# Patient Record
Sex: MALE | Race: WHITE | NOT HISPANIC OR LATINO | Employment: FULL TIME | URBAN - METROPOLITAN AREA
[De-identification: names, ages, dates, MRNs, and addresses within clinical notes are randomized per-mention and may not be internally consistent; named-entity substitution may affect disease eponyms.]

---

## 2017-01-20 NOTE — TELEPHONE ENCOUNTER
Glipizide         Invokana    Called pharmacy, they had not received the 11/2/16 refill we sent.  I spoke with pharmacist to give verbal.    We can delete these requests.

## 2017-01-23 RX ORDER — GLIPIZIDE 10 MG/1
TABLET, FILM COATED, EXTENDED RELEASE ORAL
Qty: 180 TABLET | Refills: 0 | OUTPATIENT
Start: 2017-01-23

## 2017-01-23 RX ORDER — CANAGLIFLOZIN 300 MG/1
TABLET, FILM COATED ORAL
Qty: 90 TABLET | Refills: 0 | OUTPATIENT
Start: 2017-01-23

## 2017-03-21 ENCOUNTER — OFFICE VISIT (OUTPATIENT)
Dept: DERMATOLOGY | Facility: CLINIC | Age: 42
End: 2017-03-21
Payer: COMMERCIAL

## 2017-03-21 DIAGNOSIS — L21.9 DERMATITIS, SEBORRHEIC: Primary | ICD-10-CM

## 2017-03-21 DIAGNOSIS — Z86.018 HISTORY OF DYSPLASTIC NEVUS: ICD-10-CM

## 2017-03-21 DIAGNOSIS — D23.9 DERMATOFIBROMA: ICD-10-CM

## 2017-03-21 DIAGNOSIS — R21 RASH: ICD-10-CM

## 2017-03-21 PROCEDURE — 99213 OFFICE O/P EST LOW 20 MIN: CPT | Performed by: DERMATOLOGY

## 2017-03-21 RX ORDER — KETOCONAZOLE 20 MG/ML
SHAMPOO TOPICAL
Qty: 120 ML | Refills: 9 | Status: SHIPPED | OUTPATIENT
Start: 2017-03-21 | End: 2017-07-06

## 2017-03-21 RX ORDER — DESONIDE 0.5 MG/G
CREAM TOPICAL
Qty: 60 G | Refills: 0 | Status: SHIPPED | OUTPATIENT
Start: 2017-03-21 | End: 2018-07-10

## 2017-03-21 NOTE — NURSING NOTE
Dermatology Rooming Note    Jose Crews's goals for this visit include:   Chief Complaint   Patient presents with     RECHECK     1 year skin check - Hx of dysplastic nevus - spot of concern on lower left leg       Is a scribe okay for this visit: YES    Are records needed for this visit(If yes, obtain release of information): NO     Vitals: There were no vitals taken for this visit.    Referring Provider:  No referring provider defined for this encounter.    Sarah Paniagua, CMA

## 2017-03-21 NOTE — MR AVS SNAPSHOT
After Visit Summary   3/21/2017    Jose Crews    MRN: [id number]           Patient Information     Date Of Birth          1975        Visit Information        Provider Department      3/21/2017 8:45 AM Klarissa Cancino MD [facility]        Today's Diagnoses     Dermatitis, seborrheic    -  1    History of dysplastic nevus        Dermatofibroma        Rash          Care Instructions    Recommend avoiding deodorants with aluminum chlorohexahydrate (in all antiperspirants). Discussed that propylene glycol or fragrance may also cause dermatitis as an irritant or allergen. If dermatitis continues despite change in deodorant, recommend patient be seen for patch testing. Recommend trial on Almay fragrance free product.           Follow-ups after your visit        Your next 10 appointments already scheduled     Apr 10, 2017  8:15 AM CDT   Return Visit with Jeff Adkins MD, [facility] NURSE ONLY   [facility] ([facility])    [address] [place] [address] [phone]            Jul 06, 2017  8:45 AM CDT   Return Visit with Klarissa Cancino MD   [facility] ([facility])    [address] [place] [id number] [phone]              Who to contact     If you have questions or need follow up information about today's clinic visit or your schedule please contact [facility] directly at [phone].  Normal or non-critical lab and imaging results will be communicated to you by MyChart, letter or phone within 4 business days after the clinic has received the results. If you do not hear from us within 7 days, please contact the clinic through MyChart or phone. If you have a critical or abnormal lab result, we will notify you by phone as soon as possible.  Submit refill requests through [employer] or call your pharmacy and they will forward the refill request to us. Please  allow 3 business days for your refill to be completed.          Additional Information About Your Visit        [employer] Information     Story[employer] is an electronic gateway that provides easy, online access to your medical records. With Storyful, you can request a clinic appointment, read your test results, renew a prescription or communicate with your care team.     To sign up for Storyful visit the website at [email or web address]   You will be asked to enter the access code listed below, as well as some personal information. Please follow the directions to create your username and password.     Your access code is: [email or web address]  Expires: 2017  9:15 AM     Your access code will  in 90 days. If you need help or a new code, please contact your [facility] or call [phone] for assistance.        Care EveryWhere ID     This is your Care EveryWhere ID. This could be used by other organizations to access your [place] medical records  [id number]         Blood Pressure from Last 3 Encounters:   10/31/16 118/86   16 130/80   16 129/84    Weight from Last 3 Encounters:   10/31/16 219 lb (99.3 kg)   16 221 lb 12.8 oz (100.6 kg)   16 220 lb 4.8 oz (99.9 kg)              Today, you had the following     No orders found for display         Today's Medication Changes          These changes are accurate as of: 3/21/17  9:22 AM.  If you have any questions, ask your nurse or doctor.               Stop taking these medicines if you haven't already. Please contact your care team if you have questions.     fluocinolone 0.01 % solution   Commonly known as:  SYNALAR                Where to get your medicines      These medications were sent to [facility] - [address]  [address] [place] [address] [address]     Phone:  [phone]     desonide 0.05 % cream    ketoconazole 2 % shampoo                Primary Care Provider Office  Phone # Fax #    Steffanie Villa [phone]       [facility] [address]  [facility] [address]        Thank you!     Thank you for choosing [facility]  for your care. Our goal is always to provide you with excellent care. Hearing back from our patients is one way we can continue to improve our services. Please take a few minutes to complete the written survey that you may receive in the mail after your visit with us. Thank you!             Your Updated Medication List - Protect others around you: Learn how to safely use, store and throw away your medicines at www.disposemymeds.org.          This list is accurate as of: 3/21/17  9:22 AM.  Always use your most recent med list.                   Brand Name Dispense Instructions for use    * ACE/ARB NOT PRESCRIBED (INTENTIONAL)      by Other route continuous prn.       * ARB NOT PRESCRIBED (INTENTIONAL)      by Other route continuous prn.       aspirin 81 MG tablet      Take 1 tablet by mouth daily. *       blood glucose calibration NORMAL solution     2 Bottle    Use to calibrate blood glucose monitor as directed.       blood glucose monitoring lancets     100 Box    1 each daily       * blood glucose monitoring test strip    no brand specified    3 Month    by In Vitro route 1- 2 times daily. Use daily to test blood sugar.with dez contour.       * blood glucose monitoring test strip    DEZ CONTOUR    100 each    TEST BLOOD SUGAR 1 TIMES A DAY OR AS DIRECTED       canagliflozin 300 MG tablet    INVOKANA    90 tablet    Take 1 tablet (300 mg) by mouth every morning (before breakfast)       DAILY MULTIVITAMIN PO      Take 1 tablet by mouth daily.       desonide 0.05 % cream    DESOWEN    60 g    Apply twice daily for up to 1 week for flares. Then use twice weekly as needed       glipiZIDE 10 MG 24 hr tablet    glipiZIDE XL    180 tablet    Take 2 tablets (20 mg) by mouth daily       ketoconazole 2 % shampoo     NIZORAL    120 mL    Apply to the affected area and wash off after 5 minutes, use three times weekly       metFORMIN 1000 MG tablet    GLUCOPHAGE    180 tablet    TAKE 1 TABLET TWO TIMES A DAY WITH MEALS       simvastatin 40 MG tablet    ZOCOR    90 tablet    Take 1 tablet (40 mg) by mouth At Bedtime       * Notice:  This list has 4 medication(s) that are the same as other medications prescribed for you. Read the directions carefully, and ask your doctor or other care provider to review them with you.

## 2017-03-21 NOTE — LETTER
3/21/2017      RE: Jose Crews  [address]  [facility] [address]       [facility] Dermatology Note      Dermatology Problem List:  1. 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 Jesus's note on 3/8/2011  2. Seborrheic dermatitis  -Previous Tx: fluocinolone (initiated 4/27/2015), ketoconazole shampoo (initiated 4/27/2015), desonide 0.05% cream    Encounter Date: Mar 21, 2017    CC:  Chief Complaint   Patient presents with     RECHECK     1 year skin check - Hx of dysplastic nevus - spot of concern on lower left leg       History of Present Illness:  Mr. Jose Crews is a 41 year old male who presents as a follow-up for history of dysplastic nevus. The patient was last seen 3/22/2016 when desonide was started for swelling of the foreskin and a skin tag was removed on the left upper eyelid. Today, the patient reports a lesion on the left lower leg that he was initially told was a bug bite. His prior removal site on the left upper eyelid healed well. The swelling of his foreskin is resolved. Notes some flaking of the scalp if he does not use ketoconazole shampoo. He uses Desonide intermittently for scaling on his face. Reports history of irritation in his armpits depending on what deodorant he uses. The patient reports no other lesions of concern.     Past Medical History:   Patient Active Problem List   Diagnosis     Hyperlipidemia LDL goal <100     Obesity     Seborrhea     Elevated liver enzymes     History of dysplastic nevus     Type 2 diabetes mellitus with hyperglycemia (H)     Balanoposthitis     Phimosis     Type 2 diabetes mellitus with hyperglycemia, without long-term current use of insulin (H)     Past Medical History   Diagnosis Date     Acne vulgaris      Balanitis 2/23/2009     History of atypical/dysplastic nevus 03/2010     back with positive margins.     MEDICAL HISTORY OF - age 12     hospitalized for a week with unexplained  abdominal pain and vomiting     Type II or unspecified type diabetes mellitus without mention of complication, not stated as uncontrolled      Past Surgical History   Procedure Laterality Date     Hc tooth extraction w/forcep  2006     Social History:  The patient works as a . The patient uses 1-2  alcoholic beverages per week. The patient denies use of tanning beds.    Family History:  There is a family history of melanoma in the patient's aunt.   There is no family history of asthma, psoriasis, eczema or hay fever.     Medications:  Current Outpatient Prescriptions   Medication Sig Dispense Refill     glipiZIDE (GLIPIZIDE XL) 10 MG 24 hr tablet Take 2 tablets (20 mg) by mouth daily 180 tablet 1     canagliflozin (INVOKANA) 300 MG tablet Take 1 tablet (300 mg) by mouth every morning (before breakfast) 90 tablet 1     simvastatin (ZOCOR) 40 MG tablet Take 1 tablet (40 mg) by mouth At Bedtime 90 tablet 3     blood glucose monitoring (KELLY CONTOUR) test strip TEST BLOOD SUGAR 1 TIMES A DAY OR AS DIRECTED 100 each 3     metFORMIN (GLUCOPHAGE) 1000 MG tablet TAKE 1 TABLET TWO TIMES A DAY WITH MEALS 180 tablet 3     blood glucose monitoring (ONE TOUCH ULTRASOFT) lancets 1 each daily 100 Box 3     ketoconazole (NIZORAL) 2 % shampoo Apply to the affected area and wash off after 5 minutes, use three times weekly 120 mL 9     desonide (DESOWEN) 0.05 % cream Apply twice daily for up to 1 week for flares. Then use twice weekly as needed 60 g 0     fluocinolone (SYNALAR) 0.01 % external solution For scalp, apply nightly for up to 1 week for flares, then 1-2 times weekly as needed 60 mL 6     blood glucose calibration (KELLY CONTOUR) NORMAL solution Use to calibrate blood glucose monitor as directed. 2 Bottle 0     glucose blood VI test strips strip by In Vitro route 1- 2 times daily. Use daily to test blood sugar.with kelly contour. 3 Month 3     aspirin 81 MG tablet Take 1 tablet by mouth daily. *        Multiple Vitamin (DAILY MULTIVITAMIN PO) Take 1 tablet by mouth daily.       ARB NOT PRESCRIBED, INTENTIONAL, by Other route continuous prn.  0     ACE/ARB NOT PRESCRIBED, INTENTIONAL, by Other route continuous prn.       No Known Allergies    Review of Systems:  -Const: The patient is generally feeling well today.   -Skin: As above in HPI. No additional skin concerns.     Physical exam:  GENERAL: This is a well developed, well-nourished male in no acute distress, in a pleasant mood.    SKIN: Total skin excluding the undergarment areas was performed. The exam included the head/face, neck, both arms, chest, back, abdomen, both legs, digits and/or nails.   -There is a well healed surgical scar without erythema, nodularity or telangiectasias on the back.   -There is a firm tan/flesh colored papule that dimples with lateral pressure on the left lateral lower leg.  -Mild macular erythema of the glabella.  -Pink scaly patches, somewhat granular and hyperpigmented in the axilla.  Woods lamp negative  -No other lesions of concern on areas examined.     Impression/Plan:  1. History of dysplastic nevus, no clinical evidence of recurrence.    Recommend sunscreens SPF #30 or greater, protective clothing and avoidance of tanning beds.  2. Dermatofibroma, left lateral lower leg    No further intervention required at this time.   3. Seborrheic dermatitis, face and scalp, mild. Well controlled with current topicals.     For scalp, continue ketoconazole 2% shampoo. Apply every other day to the scalp.     For face, continue desonide 0.05% ointment. Apply daily to the scalp for up to two weeks for flares.   4. Rash-Pink scaly patches, somewhat granular and hyperpigmented, axilla. Woods lamp negative. Favor contact dermatitis versus axillary granular parakeratosis    Recommend avoiding deodorants with aluminum chlorohexahydrate (in all antiperspirants). Discussed that propylene glycol or fragrance may also cause dermatitis as an  irritant or allergen. If dermatitis continues despite change in deodorant, recommend patient be seen for patch testing. Recommend trial on Almay fragrance free product.     Start Desonide 0.05% ointment. Apply daily to the axilla for up to two weeks.     Follow up in 2-3 months for axillae- earlier for new or changing lesions.     Staff Involved:  Scribe/Staff    Scribe Disclosure:   I, Elsa Walker, am serving as a scribe to document services personally performed by Dr. Klarissa Cancino, based on data collection and the provider's statements to me.      Provider Disclosure:   I agree with above History, Review of Systems, Physical exam and Plan. I have reviewed the content of the documentation and have edited it as needed. I have personally performed the services documented here and the documentation accurately represents those services and the decisions I have made.     Klarissa Cancino MD    Department of Dermatology  [facility]: Phone: [phone], Fax:[phone]  [facility]: Phone: [phone], Fax: [phone]        Klarissa Cancino MD

## 2017-03-21 NOTE — PATIENT INSTRUCTIONS
Recommend avoiding deodorants with aluminum chlorohexahydrate (in all antiperspirants). Discussed that propylene glycol or fragrance may also cause dermatitis as an irritant or allergen. If dermatitis continues despite change in deodorant, recommend patient be seen for patch testing. Recommend trial on Almay fragrance free product.

## 2017-04-05 DIAGNOSIS — E11.65 TYPE 2 DIABETES MELLITUS WITH HYPERGLYCEMIA, WITHOUT LONG-TERM CURRENT USE OF INSULIN (H): ICD-10-CM

## 2017-04-05 DIAGNOSIS — E78.5 HYPERLIPIDEMIA LDL GOAL <100: ICD-10-CM

## 2017-04-05 RX ORDER — GLIPIZIDE 10 MG/1
20 TABLET, FILM COATED, EXTENDED RELEASE ORAL DAILY
Qty: 180 TABLET | Refills: 0 | Status: SHIPPED | OUTPATIENT
Start: 2017-04-05 | End: 2017-05-22

## 2017-04-05 NOTE — TELEPHONE ENCOUNTER
Zocor     Last Written Prescription Date: 11/02/16  Last Fill Quantity: 90, # refills: 3  Last Office Visit with [facility] prescribing provider: 10/31/16  Next 5 appointments (look out 90 days)     Apr 10, 2017  8:15 AM CDT   [facility] Visit with Jeff Adkins MD, [facility]    [address] [facility] [address]   [phone]                   Lab Results   Component Value Date    CHOL 150 11/02/2016     Lab Results   Component Value Date    HDL 23 11/02/2016     Lab Results   Component Value Date    LDL 48 11/02/2016     Lab Results   Component Value Date    TRIG 393 11/02/2016     Lab Results   Component Value Date    CHOLHDLRATIO 6.1 10/08/2015     Metformin 1,000 mg         Last Written Prescription Date: 07/13/16  Last Fill Quantity: 180, # refills: 3  Last Office Visit with [facility] or [facility] prescribing provider:  10/31/16   Next 5 appointments (look out 90 days)     Apr 10, 2017  8:15 AM CDT   Return Visit with Jeff Adkins MD, [facility]URSE [facility]   [facility]    [address] [facility] [address] [phone]                   BP Readings from Last 3 Encounters:   10/31/16 118/86   04/01/16 130/80   03/28/16 129/84     Lab Results   Component Value Date    MICROL 8 11/02/2016     Lab Results   Component Value Date    UMALCR 8.72 11/02/2016     Creatinine   Date Value Ref Range Status   11/02/2016 0.94 0.66 - 1.25 mg/dL Final   ]  GFR Estimate   Date Value Ref Range Status   11/02/2016 88 >60 mL/min/1.7m2 Final     Comment:     Non  GFR Calc   10/08/2015 84 >60 mL/min/1.7m2 Final     Comment:     Non  GFR Calc   02/04/2015 77 >60 mL/min/1.7m2 Final     Comment:     Non  GFR Calc     GFR Estimate If Black   Date Value Ref Range Status   11/02/2016 >90   GFR Calc   >60  mL/min/1.7m2 Final   10/08/2015 >90   GFR Calc   >60 mL/min/1.7m2 Final   02/04/2015 >90   GFR Calc   >60 mL/min/1.7m2 Final     Lab Results   Component Value Date    CHOL 150 11/02/2016     Lab Results   Component Value Date    HDL 23 11/02/2016     Lab Results   Component Value Date    LDL 48 11/02/2016     Lab Results   Component Value Date    TRIG 393 11/02/2016     Lab Results   Component Value Date    CHOLHDLRATIO 6.1 10/08/2015     Lab Results   Component Value Date    AST 23 11/02/2016     Lab Results   Component Value Date    ALT 64 11/02/2016     Lab Results   Component Value Date    A1C 7.0 11/02/2016    A1C 8.0 02/17/2016    A1C 10.5 10/08/2015    A1C 8.0 05/05/2015    A1C 8.5 02/04/2015     Potassium   Date Value Ref Range Status   11/02/2016 4.4 3.4 - 5.3 mmol/L Final     Glipizide         Last Written Prescription Date: 11/02/16  Last Fill Quantity: 180, # refills: 1  Last Office Visit with [facility] or [facility] prescribing provider:  10/31/16   Next 5 appointments (look out 90 days)     Apr 10, 2017  8:15 AM CDT   Return Visit with Jeff Adkins MD, [facility] NURSE ONLY   [facility]    [address] [place] [address] [phone]                   BP Readings from Last 3 Encounters:   10/31/16 118/86   04/01/16 130/80   03/28/16 129/84     Lab Results   Component Value Date    MICROL 8 11/02/2016     Lab Results   Component Value Date    UMALCR 8.72 11/02/2016     Creatinine   Date Value Ref Range Status   11/02/2016 0.94 0.66 - 1.25 mg/dL Final   ]  GFR Estimate   Date Value Ref Range Status   11/02/2016 88 >60 mL/min/1.7m2 Final     Comment:     Non  GFR Calc   10/08/2015 84 >60 mL/min/1.7m2 Final     Comment:     Non  GFR Calc   02/04/2015 77 >60 mL/min/1.7m2 Final     Comment:     Non  GFR Calc     GFR Estimate If Black   Date Value Ref Range  Status   11/02/2016 >90   GFR Calc   >60 mL/min/1.7m2 Final   10/08/2015 >90   GFR Calc   >60 mL/min/1.7m2 Final   02/04/2015 >90   GFR Calc   >60 mL/min/1.7m2 Final     Lab Results   Component Value Date    CHOL 150 11/02/2016     Lab Results   Component Value Date    HDL 23 11/02/2016     Lab Results   Component Value Date    LDL 48 11/02/2016     Lab Results   Component Value Date    TRIG 393 11/02/2016     Lab Results   Component Value Date    CHOLHDLRATIO 6.1 10/08/2015     Lab Results   Component Value Date    AST 23 11/02/2016     Lab Results   Component Value Date    ALT 64 11/02/2016     Lab Results   Component Value Date    A1C 7.0 11/02/2016    A1C 8.0 02/17/2016    A1C 10.5 10/08/2015    A1C 8.0 05/05/2015    A1C 8.5 02/04/2015     Potassium   Date Value Ref Range Status   11/02/2016 4.4 3.4 - 5.3 mmol/L Final     Invokana         Last Written Prescription Date: 11/02/16  Last Fill Quantity: 90, # refills: 1  Last Office Visit with [facility], [facility] or [facility] prescribing provider:  10/31/16   Next 5 appointments (look out 90 days)     Apr 10, 2017  8:15 AM CDT   Return Visit with Jeff Adkins MD, [facility] NURSE ONLY   [email or web address] ([email or web address]    [address] [place] [address] [phone]                   BP Readings from Last 3 Encounters:   10/31/16 118/86   04/01/16 130/80   03/28/16 129/84     Lab Results   Component Value Date    MICROL 8 11/02/2016     Lab Results   Component Value Date    UMALCR 8.72 11/02/2016     Creatinine   Date Value Ref Range Status   11/02/2016 0.94 0.66 - 1.25 mg/dL Final   ]  GFR Estimate   Date Value Ref Range Status   11/02/2016 88 >60 mL/min/1.7m2 Final     Comment:     Non  GFR Calc   10/08/2015 84 >60 mL/min/1.7m2 Final     Comment:     Non  GFR Calc   02/04/2015 77 >60 mL/min/1.7m2 Final     Comment:     Non  GFR  Calc     GFR Estimate If Black   Date Value Ref Range Status   11/02/2016 >90   GFR Calc   >60 mL/min/1.7m2 Final   10/08/2015 >90   GFR Calc   >60 mL/min/1.7m2 Final   02/04/2015 >90   GFR Calc   >60 mL/min/1.7m2 Final     Lab Results   Component Value Date    CHOL 150 11/02/2016     Lab Results   Component Value Date    HDL 23 11/02/2016     Lab Results   Component Value Date    LDL 48 11/02/2016     Lab Results   Component Value Date    TRIG 393 11/02/2016     Lab Results   Component Value Date    CHOLHDLRATIO 6.1 10/08/2015     Lab Results   Component Value Date    AST 23 11/02/2016     Lab Results   Component Value Date    ALT 64 11/02/2016     Lab Results   Component Value Date    A1C 7.0 11/02/2016    A1C 8.0 02/17/2016    A1C 10.5 10/08/2015    A1C 8.0 05/05/2015    A1C 8.5 02/04/2015     Potassium   Date Value Ref Range Status   11/02/2016 4.4 3.4 - 5.3 mmol/L Final       RN called [facility] and was informed that the patient has refills available for the Simvastatin, but not the other 3 diabetic medications.    Routing refill request to provider for review/approval because:  Patient using mail order pharmacy and protocol only allows a 30 day refill be sent.    Jyothi Mehta RN

## 2017-04-05 NOTE — TELEPHONE ENCOUNTER
Reason for Call:  Other     Detailed comments: Patient's wife is calling to give care team heads up that [facility] is sending a request to renew prescription for patient. Care team only have within five days to respond to request. Medication that needs renew is Zocor, Glipizide, Metformin and Invokana. Call patient to advice if need be. Thanks.     Phone Number Patient can be reached at: Home number on file [phone] (home)    Best Time: anytime    Can we leave a detailed message on this number? YES    Call taken on 4/5/2017 at 12:48 PM by Tobi Corrales

## 2017-04-07 RX ORDER — GLIPIZIDE 10 MG/1
TABLET, FILM COATED, EXTENDED RELEASE ORAL
Qty: 180 TABLET | Refills: 0 | OUTPATIENT
Start: 2017-04-07

## 2017-04-07 RX ORDER — CANAGLIFLOZIN 300 MG/1
TABLET, FILM COATED ORAL
Qty: 90 TABLET | Refills: 0 | OUTPATIENT
Start: 2017-04-07

## 2017-04-07 NOTE — TELEPHONE ENCOUNTER
Leann         Last Written Prescription Date: 04/05/17  Last Fill Quantity: 90, # refills: 0  Last Office Visit with [facility], [facility] or [facility] prescribing provider:  10/31/16   Next 5 appointments (look out 90 days)     Apr 10, 2017  8:15 AM CDT   Return Visit with Jeff Adkins MD, [facility]    [address] [facility] [address] [phone]            Jul 06, 2017  8:45 AM CDT   [facility] Visit with [facility]   [facility]    [address] [facility] [id number] [phone]                   BP Readings from Last 3 Encounters:   10/31/16 118/86   04/01/16 130/80   03/28/16 129/84     Lab Results   Component Value Date    MICROL 8 11/02/2016     Lab Results   Component Value Date    UMALCR 8.72 11/02/2016     Creatinine   Date Value Ref Range Status   11/02/2016 0.94 0.66 - 1.25 mg/dL Final   ]  GFR Estimate   Date Value Ref Range Status   11/02/2016 88 >60 mL/min/1.7m2 Final     Comment:     Non  GFR Calc   10/08/2015 84 >60 mL/min/1.7m2 Final     Comment:     Non  GFR Calc   02/04/2015 77 >60 mL/min/1.7m2 Final     Comment:     Non  GFR Calc     GFR Estimate If Black   Date Value Ref Range Status   11/02/2016 >90   GFR Calc   >60 mL/min/1.7m2 Final   10/08/2015 >90   GFR Calc   >60 mL/min/1.7m2 Final   02/04/2015 >90   GFR Calc   >60 mL/min/1.7m2 Final     Lab Results   Component Value Date    CHOL 150 11/02/2016     Lab Results   Component Value Date    HDL 23 11/02/2016     Lab Results   Component Value Date    LDL 48 11/02/2016     Lab Results   Component Value Date    TRIG 393 11/02/2016     Lab Results   Component Value Date    CHOLHDLRATIO 6.1 10/08/2015     Lab Results   Component Value Date    AST 23 11/02/2016     Lab Results   Component Value Date    ALT  64 11/02/2016     Lab Results   Component Value Date    A1C 7.0 11/02/2016    A1C 8.0 02/17/2016    A1C 10.5 10/08/2015    A1C 8.0 05/05/2015    A1C 8.5 02/04/2015     Potassium   Date Value Ref Range Status   11/02/2016 4.4 3.4 - 5.3 mmol/L Final     Glipizide 10 mg         Last Written Prescription Date: 04/05/17  Last Fill Quantity: 180, # refills: 0  Last Office Visit with [facility], [facility] or [facility] prescribing provider:  10/31/16   Next 5 appointments (look out 90 days)     Apr 10, 2017  8:15 AM CDT   Return Visit with Jeff Adkins MD, [facility]   [facility]    [address] [place] [address] [phone]            Jul 06, 2017  8:45 AM CDT   Return Visit with Klarissa [facility]ino [facility]   [facility]    [address] [place] [address] [phone]                   BP Readings from Last 3 Encounters:   10/31/16 118/86   04/01/16 130/80   03/28/16 129/84     Lab Results   Component Value Date    MICROL 8 11/02/2016     Lab Results   Component Value Date    UMALCR 8.72 11/02/2016     Creatinine   Date Value Ref Range Status   11/02/2016 0.94 0.66 - 1.25 mg/dL Final   ]  GFR Estimate   Date Value Ref Range Status   11/02/2016 88 >60 mL/min/1.7m2 Final     Comment:     Non  GFR Calc   10/08/2015 84 >60 mL/min/1.7m2 Final     Comment:     Non  GFR Calc   02/04/2015 77 >60 mL/min/1.7m2 Final     Comment:     Non  GFR Calc     GFR Estimate If Black   Date Value Ref Range Status   11/02/2016 >90   GFR Calc   >60 mL/min/1.7m2 Final   10/08/2015 >90   GFR Calc   >60 mL/min/1.7m2 Final   02/04/2015 >90   GFR Calc   >60 mL/min/1.7m2 Final     Lab Results   Component Value Date    CHOL 150 11/02/2016     Lab Results   Component Value Date    HDL 23 11/02/2016     Lab Results   Component Value  Date    LDL 48 11/02/2016     Lab Results   Component Value Date    TRIG 393 11/02/2016     Lab Results   Component Value Date    CHOLHDLRATIO 6.1 10/08/2015     Lab Results   Component Value Date    AST 23 11/02/2016     Lab Results   Component Value Date    ALT 64 11/02/2016     Lab Results   Component Value Date    A1C 7.0 11/02/2016    A1C 8.0 02/17/2016    A1C 10.5 10/08/2015    A1C 8.0 05/05/2015    A1C 8.5 02/04/2015     Potassium   Date Value Ref Range Status   11/02/2016 4.4 3.4 - 5.3 mmol/L Final     Prescriptions were sent on 04/05/17 to [facility]    Jyothi Mehta RN

## 2017-04-10 ENCOUNTER — OFFICE VISIT (OUTPATIENT)
Dept: OPHTHALMOLOGY | Facility: CLINIC | Age: 42
End: 2017-04-10
Payer: COMMERCIAL

## 2017-04-10 DIAGNOSIS — H52.203 ASTIGMATISM, BILATERAL: ICD-10-CM

## 2017-04-10 DIAGNOSIS — H40.053 BORDERLINE GLAUCOMA WITH OCULAR HYPERTENSION, BILATERAL: ICD-10-CM

## 2017-04-10 DIAGNOSIS — E11.65 TYPE 2 DIABETES MELLITUS WITH HYPERGLYCEMIA, WITHOUT LONG-TERM CURRENT USE OF INSULIN (H): Primary | ICD-10-CM

## 2017-04-10 PROCEDURE — 92133 CPTRZD OPH DX IMG PST SGM ON: CPT | Performed by: OPHTHALMOLOGY

## 2017-04-10 PROCEDURE — 92015 DETERMINE REFRACTIVE STATE: CPT | Performed by: OPHTHALMOLOGY

## 2017-04-10 PROCEDURE — 92083 EXTENDED VISUAL FIELD XM: CPT | Performed by: OPHTHALMOLOGY

## 2017-04-10 PROCEDURE — 92014 COMPRE OPH EXAM EST PT 1/>: CPT | Performed by: OPHTHALMOLOGY

## 2017-04-10 ASSESSMENT — VISUAL ACUITY
OS_SC: 20/20
OS_SC: 20/20
OD_SC: 20/20
OS_SC+: +3
OD_SC: 20/20
METHOD: SNELLEN - LINEAR

## 2017-04-10 ASSESSMENT — SLIT LAMP EXAM - LIDS
COMMENTS: NORMAL
COMMENTS: NORMAL

## 2017-04-10 ASSESSMENT — REFRACTION_MANIFEST
OD_SPHERE: PLANO
OS_CYLINDER: +0.25
OD_CYLINDER: +0.50
OS_SPHERE: PLANO
OS_AXIS: 010
OD_AXIS: 005

## 2017-04-10 ASSESSMENT — PACHYMETRY
OS_CT(UM): 641
OD_CT(UM): 637

## 2017-04-10 ASSESSMENT — CUP TO DISC RATIO
OS_RATIO: 0.3
OD_RATIO: 0.3

## 2017-04-10 ASSESSMENT — CONF VISUAL FIELD
OS_NORMAL: 1
OD_NORMAL: 1

## 2017-04-10 ASSESSMENT — EXTERNAL EXAM - LEFT EYE: OS_EXAM: NORMAL

## 2017-04-10 ASSESSMENT — TONOMETRY
IOP_METHOD: ICARE
OD_IOP_MMHG: 24
OS_IOP_MMHG: 25

## 2017-04-10 ASSESSMENT — EXTERNAL EXAM - RIGHT EYE: OD_EXAM: NORMAL

## 2017-04-10 NOTE — MR AVS SNAPSHOT
After Visit Summary   4/10/2017    Jose Crews    MRN: [id number]           Patient Information     Date Of Birth          1975        Visit Information        Provider Department      4/10/2017 8:15 AM Jeff Adkins MD;  OPHTH NURSE ONLY [facility]        Today's Diagnoses     Type 2 diabetes mellitus with hyperglycemia, without long-term current use of insulin (H)    -  1    Borderline glaucoma with ocular hypertension, bilateral        Astigmatism, bilateral           Follow-ups after your visit        Follow-up notes from your care team     Return in about 1 year (around 4/10/2018) for Diabetic exam, Glaucoma check, Visual field and OCT.      Your next 10 appointments already scheduled     Jul 06, 2017  8:45 AM CDT   Return Visit with Klarissa Cancino MD   [facility]    [address] [place] [address] [phone]              Who to contact     If you have questions or need follow up information about today's clinic visit or your schedule please contact [facility] directly at [phone].  Normal or non-critical lab and imaging results will be communicated to you by MyChart, letter or phone within 4 business days after the clinic has received the results. If you do not hear from us within 7 days, please contact the clinic through Funnelyhart or phone. If you have a critical or abnormal lab result, we will notify you by phone as soon as possible.  Submit refill requests through [email or web address] or call your pharmacy and they will forward the refill request to us. Please allow 3 business days for your refill to be completed.          Additional Information About Your Visit        Funnelyhart Information     [email or web address] is an electronic gateway that provides easy, online access to your medical records. With [email or web address], you can request a clinic appointment, read your test results, renew a prescription or  communicate with your care team.     To sign up for YoQueVoshart visit the website at [email or web address]   You will be asked to enter the access code listed below, as well as some personal information. Please follow the directions to create your username and password.     Your access code is: [email or web address]  Expires: 2017  9:15 AM     Your access code will  in 90 days. If you need help or a new code, please contact your [facility] or call [phone] for assistance.        Care EveryWhere ID     This is your Care EveryWhere ID. This could be used by other organizations to access your [place] medical records  [id number]         Blood Pressure from Last 3 Encounters:   10/31/16 118/86   16 130/80   16 129/84    Weight from Last 3 Encounters:   10/31/16 99.3 kg (219 lb)   16 100.6 kg (221 lb 12.8 oz)   16 99.9 kg (220 lb 4.8 oz)              We Performed the Following     EYE EXAM, EST PATIENT,COMPREHESV     HVF 24-2 OU     OCT Optic Nerve RNFL Optovue OU (both eyes)     REFRACTION        Primary Care Provider Office Phone # Fax #    Steffanie Villa [phone]       [facility] [address] [place] [address] N  [facility] [address]        Thank you!     Thank you for choosing [facility]  for your care. Our goal is always to provide you with excellent care. Hearing back from our patients is one way we can continue to improve our services. Please take a few minutes to complete the written survey that you may receive in the mail after your visit with us. Thank you!             Your Updated Medication List - Protect others around you: Learn how to safely use, store and throw away your medicines at www.disposemymeds.org.          This list is accurate as of: 4/10/17 12:53 PM.  Always use your most recent med list.                   Brand Name Dispense Instructions for use    * ACE/ARB NOT PRESCRIBED (INTENTIONAL)      by  Other route continuous prn.       * ARB NOT PRESCRIBED (INTENTIONAL)      by Other route continuous prn.       aspirin 81 MG tablet      Take 1 tablet by mouth daily. *       blood glucose calibration NORMAL solution     2 Bottle    Use to calibrate blood glucose monitor as directed.       blood glucose monitoring lancets     100 Box    1 each daily       * blood glucose monitoring test strip    no brand specified    3 Month    by In Vitro route 1- 2 times daily. Use daily to test blood sugar.with kelly contour.       * blood glucose monitoring test strip    KELLY CONTOUR    100 each    TEST BLOOD SUGAR 1 TIMES A DAY OR AS DIRECTED       canagliflozin 300 MG tablet    INVOKANA    90 tablet    Take 1 tablet (300 mg) by mouth every morning (before breakfast)       DAILY MULTIVITAMIN PO      Take 1 tablet by mouth daily.       desonide 0.05 % cream    DESOWEN    60 g    Apply twice daily for up to 1 week for flares. Then use twice weekly as needed       glipiZIDE 10 MG 24 hr tablet    glipiZIDE XL    180 tablet    Take 2 tablets (20 mg) by mouth daily       ketoconazole 2 % shampoo    NIZORAL    120 mL    Apply to the affected area and wash off after 5 minutes, use three times weekly       metFORMIN 1000 MG tablet    GLUCOPHAGE    180 tablet    TAKE 1 TABLET TWO TIMES A DAY WITH MEALS       simvastatin 40 MG tablet    ZOCOR    90 tablet    Take 1 tablet (40 mg) by mouth At Bedtime       * Notice:  This list has 4 medication(s) that are the same as other medications prescribed for you. Read the directions carefully, and ask your doctor or other care provider to review them with you.

## 2017-04-10 NOTE — PROGRESS NOTES
Assessment & Plan   Jose Crews is a 41 year old male who presents with:   Review of systems for the eyes was negative other than the pertinent positives and negatives noted in the HPI.  History is obtained from the patient.    Type 2 diabetes mellitus with hyperglycemia, without long-term current use of insulin (H)  DM II w/o retinopathy    Borderline glaucoma with ocular hypertension, bilateral  Continue to observe  - HVF 24-2 OU  - OCT Optic Nerve RNFL Optovue OU (both eyes)  Consider Diopsys if patient interested. Otherwise, continue to monoitor w/o drops. Thick CCT's.    Astigmatism, bilateral  - REFRACTION    Return in 1 year for annual exam w/ DFE, OCT, HVF, pach    Documentation for today's encounter was performed by Nereida HOPPER.  Acting as a scribe in my presence. I have reviewed and verified that it is an accurate recording of today's encounter.    Attending Physician Attestation:  Complete documentation of historical and exam elements from today's encounter can be found in the full encounter summary report (not reduplicated in this progress note).  I personally obtained the chief complaint(s) and history of present illness.  I confirmed and edited as necessary the review of systems, past medical/surgical history, family history, social history, and examination findings as documented by others; and I examined the patient myself.  I personally reviewed the relevant tests, images, and reports as documented above.  I formulated and edited as necessary the assessment and plan and discussed the findings and management plan with the patient and family. - Jeff Adkins MD

## 2017-05-22 DIAGNOSIS — E11.65 TYPE 2 DIABETES MELLITUS WITH HYPERGLYCEMIA, WITHOUT LONG-TERM CURRENT USE OF INSULIN (H): ICD-10-CM

## 2017-05-22 NOTE — TELEPHONE ENCOUNTER
glipiZIDE (GLIPIZIDE XL) 10 MG 24 hr tablet         Last Written Prescription Date: 4/5/17  Last Fill Quantity: 180, # refills: 0  Last Office Visit with [facility], [facility] or [facility] prescribing provider:  4/10/17 Dr. Villa     Next 5 appointments (look out 90 days)     Jul 06, 2017  8:45 AM CDT   Return Visit with Klarissa Cancino MD   [facility]    [address] [place] [address] [phone]                   BP Readings from Last 3 Encounters:   10/31/16 118/86   04/01/16 130/80   03/28/16 129/84     Lab Results   Component Value Date    MICROL 8 11/02/2016     Lab Results   Component Value Date    UMALCR 8.72 11/02/2016     Creatinine   Date Value Ref Range Status   11/02/2016 0.94 0.66 - 1.25 mg/dL Final   ]  GFR Estimate   Date Value Ref Range Status   11/02/2016 88 >60 mL/min/1.7m2 Final     Comment:     Non  GFR Calc   10/08/2015 84 >60 mL/min/1.7m2 Final     Comment:     Non  GFR Calc   02/04/2015 77 >60 mL/min/1.7m2 Final     Comment:     Non  GFR Calc     GFR Estimate If Black   Date Value Ref Range Status   11/02/2016 >90   GFR Calc   >60 mL/min/1.7m2 Final   10/08/2015 >90   GFR Calc   >60 mL/min/1.7m2 Final   02/04/2015 >90   GFR Calc   >60 mL/min/1.7m2 Final     Lab Results   Component Value Date    CHOL 150 11/02/2016     Lab Results   Component Value Date    HDL 23 11/02/2016     Lab Results   Component Value Date    LDL 48 11/02/2016     Lab Results   Component Value Date    TRIG 393 11/02/2016     Lab Results   Component Value Date    CHOLHDLRATIO 6.1 10/08/2015     Lab Results   Component Value Date    AST 23 11/02/2016     Lab Results   Component Value Date    ALT 64 11/02/2016     Lab Results   Component Value Date    A1C 7.0 11/02/2016    A1C 8.0 02/17/2016    A1C 10.5 10/08/2015    A1C 8.0 05/05/2015    A1C 8.5 02/04/2015     Potassium   Date  Value Ref Range Status   11/02/2016 4.4 3.4 - 5.3 mmol/L Final

## 2017-05-23 RX ORDER — GLIPIZIDE 10 MG/1
TABLET, EXTENDED RELEASE ORAL
Qty: 180 TABLET | Refills: 0 | Status: SHIPPED | OUTPATIENT
Start: 2017-05-23 | End: 2017-07-06

## 2017-07-06 ENCOUNTER — OFFICE VISIT (OUTPATIENT)
Dept: DERMATOLOGY | Facility: CLINIC | Age: 42
End: 2017-07-06
Payer: COMMERCIAL

## 2017-07-06 ENCOUNTER — OFFICE VISIT (OUTPATIENT)
Dept: FAMILY MEDICINE | Facility: CLINIC | Age: 42
End: 2017-07-06
Payer: COMMERCIAL

## 2017-07-06 VITALS
DIASTOLIC BLOOD PRESSURE: 86 MMHG | BODY MASS INDEX: 33.19 KG/M2 | WEIGHT: 219 LBS | HEART RATE: 82 BPM | TEMPERATURE: 97.8 F | OXYGEN SATURATION: 97 % | SYSTOLIC BLOOD PRESSURE: 120 MMHG | HEIGHT: 68 IN

## 2017-07-06 DIAGNOSIS — Z00.00 ROUTINE GENERAL MEDICAL EXAMINATION AT A HEALTH CARE FACILITY: Primary | ICD-10-CM

## 2017-07-06 DIAGNOSIS — E78.5 HYPERLIPIDEMIA LDL GOAL <100: ICD-10-CM

## 2017-07-06 DIAGNOSIS — L21.9 DERMATITIS, SEBORRHEIC: ICD-10-CM

## 2017-07-06 DIAGNOSIS — E11.65 TYPE 2 DIABETES MELLITUS WITH HYPERGLYCEMIA, WITHOUT LONG-TERM CURRENT USE OF INSULIN (H): ICD-10-CM

## 2017-07-06 LAB — HBA1C MFR BLD: 7.4 % (ref 4.3–6)

## 2017-07-06 PROCEDURE — 83036 HEMOGLOBIN GLYCOSYLATED A1C: CPT | Performed by: FAMILY MEDICINE

## 2017-07-06 PROCEDURE — 99212 OFFICE O/P EST SF 10 MIN: CPT | Performed by: DERMATOLOGY

## 2017-07-06 PROCEDURE — 36415 COLL VENOUS BLD VENIPUNCTURE: CPT | Performed by: FAMILY MEDICINE

## 2017-07-06 PROCEDURE — 99396 PREV VISIT EST AGE 40-64: CPT | Performed by: FAMILY MEDICINE

## 2017-07-06 PROCEDURE — 99207 C FOOT EXAM  NO CHARGE: CPT | Mod: 25 | Performed by: FAMILY MEDICINE

## 2017-07-06 RX ORDER — KETOCONAZOLE 20 MG/ML
SHAMPOO TOPICAL
Qty: 120 ML | Refills: 11 | Status: SHIPPED | OUTPATIENT
Start: 2017-07-06 | End: 2018-07-10

## 2017-07-06 RX ORDER — GLIPIZIDE 10 MG/1
TABLET, FILM COATED, EXTENDED RELEASE ORAL
Qty: 180 TABLET | Refills: 1 | Status: SHIPPED | OUTPATIENT
Start: 2017-07-06 | End: 2017-09-22

## 2017-07-06 RX ORDER — LANCETS
1 EACH MISCELLANEOUS DAILY
Qty: 100 EACH | Refills: 3 | Status: SHIPPED | OUTPATIENT
Start: 2017-07-06 | End: 2019-06-13

## 2017-07-06 NOTE — MR AVS SNAPSHOT
After Visit Summary   7/6/2017    Jose Crews    MRN: [id number]           Patient Information     Date Of Birth          1975        Visit Information        Provider Department      7/6/2017 1:00 PM Steffanie Villa MD [facility]        Today's Diagnoses     Hyperlipidemia LDL goal <100    -  1    Type 2 diabetes mellitus with hyperglycemia, without long-term current use of insulin (H)          Care Instructions    Long term blood sugar testing today.  Refills will be updated when results return.    Fasting labs due in November.      Preventive Health Recommendations  Male Ages 40 to 49    Yearly exam:             See your health care provider every year in order to  o   Review health changes.   o   Discuss preventive care.    o   Review your medicines if your doctor has prescribed any.    You should be tested each year for STDs (sexually transmitted diseases) if you re at risk.     Have a cholesterol test every 5 years.     Have a colonoscopy (test for colon cancer) if someone in your family has had colon cancer or polyps before age 50.     After age 45, have a diabetes test (fasting glucose). If you are at risk for diabetes, you should have this test every 3 years.      Talk with your health care provider about whether or not a prostate cancer screening test (PSA) is right for you.    Shots: Get a flu shot each year. Get a tetanus shot every 10 years.     Nutrition:    Eat at least 5 servings of fruits and vegetables daily.     Eat whole-grain bread, whole-wheat pasta and brown rice instead of white grains and rice.     Talk to your provider about Calcium and Vitamin D.     Lifestyle    Exercise for at least 150 minutes a week (30 minutes a day, 5 days a week). This will help you control your weight and prevent disease.     Limit alcohol to one drink per day.     No smoking.     Wear sunscreen to prevent skin cancer.     See your dentist every six months for an exam and  "cleaning.              Follow-ups after your visit        Who to contact     If you have questions or need follow up information about today's clinic visit or your schedule please contact [facility] directly at [phone].  Normal or non-critical lab and imaging results will be communicated to you by MyChart, letter or phone within 4 business days after the clinic has received the results. If you do not hear from us within 7 days, please contact the clinic through MyChart or phone. If you have a critical or abnormal lab result, we will notify you by phone as soon as possible.  Submit refill requests through [employer] or call your pharmacy and they will forward the refill request to us. Please allow 3 business days for your refill to be completed.          Additional Information About Your Visit        [employer] Feed[employer]over Information     [employer] gives you secure access to your electronic health record. If you see a primary care provider, you can also send messages to your care team and make appointments. If you have questions, please call your primary care clinic.  If you do not have a primary care provider, please call [phone] and they will assist you.        Care EveryWhere ID     This is your Care EveryWhere ID. This could be used by other organizations to access your [place] medical records  [id number]        Your Vitals Were     Pulse Temperature Height Pulse Oximetry BMI (Body Mass Index)       82 97.8  F (36.6  C) (Oral) 1.727 m (5' 8\") 97% 33.3 kg/m2        Blood Pressure from Last 3 Encounters:   07/06/17 120/86   10/31/16 118/86   04/01/16 130/80    Weight from Last 3 Encounters:   07/06/17 99.3 kg (219 lb)   10/31/16 99.3 kg (219 lb)   04/01/16 100.6 kg (221 lb 12.8 oz)              We Performed the Following     FOOT EXAM     HEMOGLOBIN A1C          Where to get your medicines      These medications were sent to [facility] - [place] - [address]  [address] " [place] [address]     Phone:  [phone]     blood glucose calibration NORMAL solution    blood glucose monitoring lancets    blood glucose monitoring test strip    ketoconazole 2 % shampoo          Primary Care Provider Office Phone # Fax #    Steffanie Villa [phone]       [facility] [address] [facility] [address]  [facility] [address]        Equal Access to Services     [facility] : Hadii aad ku hadasho Soomaali, waaxda luqadaha, qaybta kaalmada adeegyada, waxay idiin hayaan adeeg kharash la'aan ah. So [facility] [phone].    ATENCIÓN: Si habla yeny, tiene a graham disposición servicios gratuitos de asistencia lingüística. Llame al [phone].    We comply with applicable federal civil rights laws and [place] laws. We do not discriminate on the basis of race, color, national origin, age, disability sex, sexual orientation or gender identity.            Thank you!     Thank you for choosing [facility]  for your care. Our goal is always to provide you with excellent care. Hearing back from our patients is one way we can continue to improve our services. Please take a few minutes to complete the written survey that you may receive in the mail after your visit with us. Thank you!             Your Updated Medication List - Protect others around you: Learn how to safely use, store and throw away your medicines at www.disposemymeds.org.          This list is accurate as of: 7/6/17  1:27 PM.  Always use your most recent med list.                   Brand Name Dispense Instructions for use Diagnosis    * ACE/ARB NOT PRESCRIBED (INTENTIONAL)      by Other route continuous prn.    Type 2 diabetes, HbA1c goal < 7% (H)       * ARB NOT PRESCRIBED (INTENTIONAL)      by Other route continuous prn.    Type 2 diabetes, HbA1c goal < 7% (H)       aspirin 81 MG tablet      Take 1 tablet by mouth daily. *        blood glucose calibration NORMAL solution     2 Bottle    Use to calibrate  blood glucose monitor as directed.    Type 2 diabetes mellitus with hyperglycemia, without long-term current use of insulin (H)       blood glucose monitoring lancets     100 each    1 each daily    Type 2 diabetes mellitus with hyperglycemia, without long-term current use of insulin (H)       * blood glucose monitoring test strip    no brand specified    3 Month    by In Vitro route 1- 2 times daily. Use daily to test blood sugar.with kelly contour.    Type 2 diabetes, HbA1c goal < 7% (H)       * blood glucose monitoring test strip    KELLY CONTOUR    100 each    TEST BLOOD SUGAR 1 TIMES A DAY OR AS DIRECTED    Type 2 diabetes mellitus with hyperglycemia, without long-term current use of insulin (H)       canagliflozin 300 MG tablet    INVOKANA    90 tablet    Take 1 tablet (300 mg) by mouth every morning (before breakfast)    Type 2 diabetes mellitus with hyperglycemia, without long-term current use of insulin (H)       DAILY MULTIVITAMIN PO      Take 1 tablet by mouth daily.        desonide 0.05 % cream    DESOWEN    60 g    Apply twice daily for up to 1 week for flares. Then use twice weekly as needed    Dermatitis, seborrheic       glipiZIDE XL 10 MG 24 hr tablet   Generic drug:  glipiZIDE     180 tablet    TAKE 2 BY MOUTH DAILY    Type 2 diabetes mellitus with hyperglycemia, without long-term current use of insulin (H)       ketoconazole 2 % shampoo    NIZORAL    120 mL    Apply to the affected area and wash off after 5 minutes, use three times weekly    Dermatitis, seborrheic       metFORMIN 1000 MG tablet    GLUCOPHAGE    180 tablet    TAKE 1 TABLET TWO TIMES A DAY WITH MEALS    Type 2 diabetes mellitus with hyperglycemia, without long-term current use of insulin (H)       simvastatin 40 MG tablet    ZOCOR    90 tablet    Take 1 tablet (40 mg) by mouth At Bedtime    Hyperlipidemia LDL goal <100       * Notice:  This list has 4 medication(s) that are the same as other medications prescribed for you. Read the  directions carefully, and ask your doctor or other care provider to review them with you.

## 2017-07-06 NOTE — PROGRESS NOTES
SUBJECTIVE:   CC: Jose Crews is an 41 year old male who presents for preventative health visit.     Healthy Habits:    Do you get at least three servings of calcium containing foods daily (dairy, green leafy vegetables, etc.)? yes    Amount of exercise or daily activities, outside of work: 5 day(s) per week, walking    Problems taking medications regularly No    Medication side effects: No    Have you had an eye exam in the past two years? yes    Do you see a dentist twice per year? yes    Do you have sleep apnea, excessive snoring or daytime drowsiness?no    Concerns: None    Diabetes Follow-up      Patient is checking blood sugars: occasionally - 140 average    Diabetic concerns: None     Symptoms of hypoglycemia (low blood sugar): none     Paresthesias (numbness or burning in feet) or sores: No     Date of last diabetic eye exam: April 2017,     Hyperlipidemia Follow-Up      Rate your low fat/cholesterol diet?: good    Taking statin?  Yes, no muscle aches from statin    Other lipid medications/supplements?:  none      Today's PHQ-2 Score:   PHQ-2 ( 1999 Pfizer) 4/1/2016 2/17/2016   Q1: Little interest or pleasure in doing things 0 0   Q2: Feeling down, depressed or hopeless 0 0   PHQ-2 Score 0 0       Abuse: Current or Past(Physical, Sexual or Emotional)- No  Do you feel safe in your environment - Yes    Social History   Substance Use Topics     Smoking status: Former Smoker     Quit date: 1/1/2000     Smokeless tobacco: Never Used     Alcohol use Yes      Comment: occassionally     The patient does not drink >3 drinks per day nor >7 drinks per week.    Last PSA: No results found for: PSA    Reviewed orders with patient. Reviewed health maintenance and updated orders accordingly - Yes  BP Readings from Last 3 Encounters:   07/06/17 120/86   10/31/16 118/86   04/01/16 130/80    Wt Readings from Last 3 Encounters:   07/06/17 99.3 kg (219 lb)   10/31/16 99.3 kg (219 lb)   04/01/16 100.6 kg (221 lb 12.8 oz)     "                Reviewed and updated as needed this visit by clinical staff  Tobacco  Allergies  Med Hx  Surg Hx  Fam Hx  Soc Hx        Reviewed and updated as needed this visit by Provider  Tobacco  Allergies  Meds  Med Hx  Surg Hx  Fam Hx  Soc Hx       Past Medical History:   Diagnosis Date     Acne vulgaris      Balanitis 2/23/2009     History of atypical/dysplastic nevus 03/2010    back with positive margins.     MEDICAL HISTORY OF - age 12    hospitalized for a week with unexplained abdominal pain and vomiting     Type II or unspecified type diabetes mellitus without mention of complication, not stated as uncontrolled       Past Surgical History:   Procedure Laterality Date     HC TOOTH EXTRACTION W/FORCEP  2006       ROS:  10 point ROS of systems including Constitutional, Eyes, Respiratory, Cardiovascular, Gastroenterology, Genitourinary, Integumentary, Muscularskeletal, Psychiatric were all negative except for pertinent positives noted in my HPI.      OBJECTIVE:   /86 (BP Location: Right arm, Patient Position: Sitting, Cuff Size: Adult Large)  Pulse 82  Temp 97.8  F (36.6  C) (Oral)  Ht 1.727 m (5' 8\")  Wt 99.3 kg (219 lb)  SpO2 97%  BMI 33.3 kg/m2  EXAM:  GENERAL: alert, no distress and obese  EYES: Eyes grossly normal to inspection, PERRL and conjunctivae and sclerae normal  HENT: ear canals and TM's normal, nose and mouth without ulcers or lesions  NECK: no adenopathy, no asymmetry, masses, or scars and thyroid normal to palpation  RESP: lungs clear to auscultation - no rales, rhonchi or wheezes  CV: regular rate and rhythm, normal S1 S2, no S3 or S4, no murmur, click or rub, no peripheral edema and peripheral pulses strong  ABDOMEN: soft, nontender, without hepatosplenomegaly or masses, bowel sounds normal and obese, rounded.   (male): normal male genitalia without lesions or urethral discharge, no hernia  MS: no gross musculoskeletal defects noted, no edema  SKIN: no suspicious " lesions or rashes  NEURO: Normal strength and tone, mentation intact and speech normal  PSYCH: mentation appears normal, affect normal/bright  LYMPH: no cervical, supraclavicular, axillary, or inguinal adenopathy    ASSESSMENT/PLAN:   1. Routine general medical examination at a health care facility  Not fasting.   Screenings above.    2. Type 2 diabetes mellitus with hyperglycemia, without long-term current use of insulin (H)  Higher than goal but acceptable A1C.  Beginning exercise program.  Reassess in 3-4 months.    - canagliflozin (INVOKANA) 300 MG tablet; Take 1 tablet (300 mg) by mouth every morning (before breakfast)  Dispense: 90 tablet; Refill: 1  - metFORMIN (GLUCOPHAGE) 1000 MG tablet; TAKE 1 TABLET TWO TIMES A DAY WITH MEALS  Dispense: 180 tablet; Refill: 1  - glipiZIDE (GLIPIZIDE XL) 10 MG 24 hr tablet; TAKE 2 BY MOUTH DAILY  Dispense: 180 tablet; Refill: 1  - blood glucose monitoring (DEZ CONTOUR) test strip; TEST BLOOD SUGAR 1 TIMES A DAY OR AS DIRECTED  Dispense: 100 each; Refill: 3  - blood glucose monitoring (ONE TOUCH ULTRASOFT) lancets; 1 each daily  Dispense: 100 each; Refill: 3  - blood glucose calibration (DEZ CONTOUR) NORMAL solution; Use to calibrate blood glucose monitor as directed.  Dispense: 2 Bottle; Refill: 0  - HEMOGLOBIN A1C  - FOOT EXAM    3. Hyperlipidemia LDL goal <100  LDL Cholesterol Calculated   Date Value Ref Range Status   11/02/2016 48 <100 mg/dL Final     Comment:     Desirable:       <100 mg/dl   ]  Due in fall with fasting labs.        COUNSELING:  Reviewed preventive health counseling, as reflected in patient instructions    BP Screening:   Last 3 BP Readings:    BP Readings from Last 3 Encounters:   07/06/17 120/86   10/31/16 118/86   04/01/16 130/80       The following was recommended to the patient:  Re-screen BP within a year and recommended lifestyle modifications     reports that he quit smoking about 17 years ago. He has never used smokeless  "tobacco.    Estimated body mass index is 33.3 kg/(m^2) as calculated from the following:    Height as of this encounter: 1.727 m (5' 8\").    Weight as of this encounter: 99.3 kg (219 lb).   Weight management plan: Discussed healthy diet and exercise guidelines and patient will follow up in 3 months in clinic to re-evaluate.    Counseling Resources:  ATP IV Guidelines  Pooled Cohorts Equation Calculator  FRAX Risk Assessment  ICSI Preventive Guidelines  Dietary Guidelines for Americans, 2010  USDA's MyPlate  ASA Prophylaxis  Lung CA Screening    Steffanie Villa MD  [facility]    Patient Instructions   Long term blood sugar testing today.  Refills will be updated when results return.    Fasting labs due in November.      "

## 2017-07-06 NOTE — NURSING NOTE
Dermatology Rooming Note    Jose Crews's goals for this visit include:   Chief Complaint   Patient presents with     Derm Problem     recheck axilla dermatitis and Seborrheic dermatitis- Pt is doing better. No other concerns today       Is a scribe okay for this visit:YES    Are records needed for this visit(If yes, obtain release of information): Not applicable     Vitals: There were no vitals taken for this visit.    Referring Provider:  No referring provider defined for this encounter.

## 2017-07-06 NOTE — PROGRESS NOTES
[facility] Dermatology Note      Dermatology Problem List:  1. 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 Jesus's note on 3/8/2011  2. Seborrheic dermatitis  -Previous Tx: fluocinolone (initiated 4/27/2015), ketoconazole shampoo (initiated 4/27/2015), desonide 0.05% cream    Encounter Date: Jul 6, 2017    CC:  Chief Complaint   Patient presents with     Derm Problem     recheck axilla dermatitis and Seborrheic dermatitis- Pt is doing better. No other concerns today       History of Present Illness:  Mr. Jose Crews is a 41 year old male with history of dysplastic nevus presents as a follow-up for rash in axilla. The patient was last seen 3/21/2017 when topicals were continued for seborrheic dermatitis. Desonide ointment was started for lesions on the axilla. Today, the patient reports that the rash in his axilla is resolved. He changed his deodorant to Almay. Reports his scalp is still doing well. The patient reports no other lesions of concern.    Past Medical History:   Patient Active Problem List   Diagnosis     Hyperlipidemia LDL goal <100     Obesity     Seborrhea     Elevated liver enzymes     History of dysplastic nevus     Type 2 diabetes mellitus with hyperglycemia (H)     Balanoposthitis     Phimosis     Type 2 diabetes mellitus with hyperglycemia, without long-term current use of insulin (H)     Past Medical History:   Diagnosis Date     Acne vulgaris      Balanitis 2/23/2009     History of atypical/dysplastic nevus 03/2010    back with positive margins.     MEDICAL HISTORY OF - age 12    hospitalized for a week with unexplained abdominal pain and vomiting     Type II or unspecified type diabetes mellitus without mention of complication, not stated as uncontrolled      Past Surgical History:   Procedure Laterality Date     HC TOOTH EXTRACTION W/FORCEP  2006     Social History:  The patient will be starting a new job as a . The patient  uses 1-2  alcoholic beverages per week. The patient denies use of tanning beds.    Family History:  There is a family history of melanoma in the patient's aunt.   There is no family history of asthma, psoriasis, eczema or hay fever.     Medications:  Current Outpatient Prescriptions   Medication Sig Dispense Refill     GLIPIZIDE XL 10 MG 24 hr tablet TAKE 2 BY MOUTH DAILY 180 tablet 0     metFORMIN (GLUCOPHAGE) 1000 MG tablet TAKE 1 TABLET TWO TIMES A DAY WITH MEALS 180 tablet 0     canagliflozin (INVOKANA) 300 MG tablet Take 1 tablet (300 mg) by mouth every morning (before breakfast) 90 tablet 0     ketoconazole (NIZORAL) 2 % shampoo Apply to the affected area and wash off after 5 minutes, use three times weekly 120 mL 9     desonide (DESOWEN) 0.05 % cream Apply twice daily for up to 1 week for flares. Then use twice weekly as needed 60 g 0     simvastatin (ZOCOR) 40 MG tablet Take 1 tablet (40 mg) by mouth At Bedtime 90 tablet 3     blood glucose monitoring (KELLY CONTOUR) test strip TEST BLOOD SUGAR 1 TIMES A DAY OR AS DIRECTED 100 each 3     blood glucose monitoring (ONE TOUCH ULTRASOFT) lancets 1 each daily 100 Box 3     blood glucose calibration (KELLY CONTOUR) NORMAL solution Use to calibrate blood glucose monitor as directed. 2 Bottle 0     glucose blood VI test strips strip by In Vitro route 1- 2 times daily. Use daily to test blood sugar.with kelly contour. 3 Month 3     aspirin 81 MG tablet Take 1 tablet by mouth daily. *       Multiple Vitamin (DAILY MULTIVITAMIN PO) Take 1 tablet by mouth daily.       ARB NOT PRESCRIBED, INTENTIONAL, by Other route continuous prn.  0     ACE/ARB NOT PRESCRIBED, INTENTIONAL, by Other route continuous prn.       No Known Allergies    Review of Systems:  -Skin: As above in HPI. No additional skin concerns.   -Const: The patient is generally feeling well today.   No recent illnesses  Physical exam:  GENERAL: This is a well developed, well-nourished male in no acute  distress, in a pleasant mood.    SKIN: Focused examination of the axilla was performed.  -Axilla are clear.   -No other lesions of concern on areas examined.     Impression/Plan:  1. History of dysplastic nevus-not addressed at visit    Last total skin exam 3/21/2017  2. Seborrheic dermatitis, face and scalp, mild. Well controlled with current topicals    For scalp, continue ketoconazole 2% shampoo. Apply every other day to the scalp.     For face, continue desonide 0.05% ointment. Apply daily to the scalp for up to two weeks for flares.   3. Rash-Pink scaly patches, somewhat granular and hyperpigmented, axilla. Woods lamp negative. Favor contact dermatitis versus axillary granular parakeratosis. Resolved with Desonide ointment.     Continue Almay deodorant.   Discussed with patient that if the rash returns and does not resolve with Desonide ointment plan for patch testing. Information for Dr. Chua provided to patient.       Follow up in 12-14 months, earlier for new or changing lesions.     Staff Involved:  Scribe/Staff    Scribe Disclosure:   I, Elsa Walker, am serving as a scribe to document services personally performed by Dr. Klarissa Cancino, based on data collection and the provider's statements to me.    Provider Disclosure:   The documentation recorded by the scribe accurately reflects the services I personally performed and the decisions made by me.    Klarissa Cancino MD    Department of Dermatology  [facility]: Phone: [phone], Fax:[phone]  [facility]: Phone: [phone], Fax: [phone]

## 2017-07-06 NOTE — MR AVS SNAPSHOT
After Visit Summary   7/6/2017    Jose Crews    MRN: [id number]           Patient Information     Date Of Birth          1975        Visit Information        Provider Department      7/6/2017 8:45 AM Klarissa Cancino MD [facility]        Today's Diagnoses     Dermatitis, seborrheic          Care Instructions    It was a pleasure to see you at your recent visit in Dermatology.    We will schedule your next follow up appointment however, your appointment may be rescheduled due to any unforseen circumstances.    If you have any questions or concerns, please feel free to contact us at [facility] at [phone].        If you have an urgent skin problem while Dr. Cancino is on maternity leave you may be seen by Dr. Mauricio at [facility] or at the [facility].     They are able to see your current dermatology medical record.      Hours and appointment phone numbers may vary by service.      Dr. Mauricio  [facility]  [address] [place] [address]  [phone]    Directions   [facility] is located at [address] between [place] [address] and [place] [address] in [facility], four miles north of [place]. Access to the medical center is from [place] [address] via [address].    Location and directions  From [place] [address]  Take [address] at [place] to [address]. Go east on [address]. ([address]) one-quarter mile to the stoplight at the intersection with [address] . Turn right. Follow [place] [address] to the stoplight at [address]. Turn right onto [address]. The medical center is straight ahead.  From the east  Take [address] west to the stoplight at its intersection with [address] one mile west of [place]. Turn right onto [address] and then immediately turn left onto [address]. Follow 22 to [place]. When 22 intersects with [place] [address],  "turn left onto [address]. Go south one mile to the stoplight at [address] Turn right onto [address] to go straight to the medical center.  From the west  Take [address] ([address] east until it crosses over [address] in [place]. At the stoplight, turn right onto [address]. Following [address] one mile to the stoplight at [address] Turn right to get to the medical center.  From the north or south via [address]  Take [address] to [place]. The medical center is located on the south side of [place].  At the stoplight at the intersection of [address] and [address] turn west onto [address] and follow it to the medical center entrance.          [facility] [address] [address]  [phone]    Directions and Parking   Directions  1. [address] to [address] [place], and merge into the left turn anmol.  2. Turn left on [address]. The [facility] will be on your right. Turn into the driveway for  parking service.  Parking   parking:   We encourage patients and visitors to use convenient  parking service at the [facility]. Enter the main arrival plaza from [address].  cost is a $6 flat rate fee, regardless of your length of visit. Tips are not expected.   Self-parking:   Enter the main arrival plaza at the [facility] from [address]. From there, parking attendants will direct you to the best self-parking option. Bring your parking tickets inside with you and pay for parking before leaving the Center to get the reduced parking rate.   Parking at [employer] Ramp:   Self-parkers who choose to park in the [employer] Ramp should enter the ramp on [address] (one block down from the [facility] main entrance). Use the center anmol designated \" Health\" and park on the ground level of [employer] [facility]. Please " bring your ticket inside with you to pay for parking to receive the [facility] reduced parking rate.  Notice: Monday-Friday, between 7-9 a.m., parking in metered spots on [address] near the Center is not permitted. Your car will be at risk of being towed during this time.      [facility]  [facility]  [address] [place] [address], [address]  [facility] [address]  Hours are:  8AM - 4:30PM M-F  Call to make Appointment : [phone]  with Dr. Chua or Dr. Elizabeth  For more information and parking:  [email or web address]                Follow-ups after your visit        Your next 10 appointments already scheduled     Jul 06, 2017  1:00 PM CDT   PHYSICAL with Steffanie Villa MD   [facility]    [address] [place] [id number] [phone]              Who to contact     If you have questions or need follow up information about today's clinic visit or your schedule please contact [facility] directly at [phone].  Normal or non-critical lab and imaging results will be communicated to you by Janrainhart, letter or phone within 4 business days after the clinic has received the results. If you do not hear from us within 7 days, please contact the clinic through MyChart or phone. If you have a critical or abnormal lab result, we will notify you by phone as soon as possible.  Submit refill requests through [employer] or call your pharmacy and they will forward the refill request to us. Please allow 3 business days for your refill to be completed.          Additional Information About Your Visit        [employer] Information     [employer] gives you secure access to your electronic health record. If you see a primary care provider, you can also send messages to your care team and make  appointments. If you have questions, please call your primary care clinic.  If you do not have a primary care provider, please call [phone] and they will assist you.      netZentry is an electronic gateway that provides easy, online access to your medical records. With netZentry, you can request a clinic appointment, read your test results, renew a prescription or communicate with your care team.     To access your existing account, please contact your [facility] or call [phone] for assistance.        Care EveryWhere ID     This is your Care EveryWhere ID. This could be used by other organizations to access your [facility] medical records  [id number]         Blood Pressure from Last 3 Encounters:   10/31/16 118/86   04/01/16 130/80   03/28/16 129/84    Weight from Last 3 Encounters:   10/31/16 219 lb (99.3 kg)   04/01/16 221 lb 12.8 oz (100.6 kg)   02/17/16 220 lb 4.8 oz (99.9 kg)              Today, you had the following     No orders found for display         Where to get your medicines      These medications were sent to [facility] - [address]  [address], [place] [address]     Phone:  [phone]     ketoconazole 2 % shampoo          Primary Care Provider Office Phone # Fax #    Steffanie Villa [phone]       [address]  [facility] [address]        Equal Access to Services     YODIT PONCE : Hadii aad ku hadasho Soomaali, waaxda luqadaha, qaybta kaalmada adeegyada, arlene smith . So [facility] [phone].    ATENCIÓN: Si habla español, tiene a graham disposición servicios gratuitos de asistencia lingüística. Llame al [phone].    We comply with applicable federal civil rights laws and [place] laws. We do not discriminate on the basis of race, color, national origin, age, disability sex, sexual orientation or gender identity.            Thank  you!     Thank you for choosing [facility]  for your care. Our goal is always to provide you with excellent care. Hearing back from our patients is one way we can continue to improve our services. Please take a few minutes to complete the written survey that you may receive in the mail after your visit with us. Thank you!             Your Updated Medication List - Protect others around you: Learn how to safely use, store and throw away your medicines at www.disposemymeds.org.          This list is accurate as of: 7/6/17  9:23 AM.  Always use your most recent med list.                   Brand Name Dispense Instructions for use Diagnosis    * ACE/ARB NOT PRESCRIBED (INTENTIONAL)      by Other route continuous prn.    Type 2 diabetes, HbA1c goal < 7% (H)       * ARB NOT PRESCRIBED (INTENTIONAL)      by Other route continuous prn.    Type 2 diabetes, HbA1c goal < 7% (H)       aspirin 81 MG tablet      Take 1 tablet by mouth daily. *        blood glucose calibration NORMAL solution     2 Bottle    Use to calibrate blood glucose monitor as directed.    Type 2 diabetes, HbA1c goal < 7% (H)       blood glucose monitoring lancets     100 Box    1 each daily    Type 2 diabetes mellitus with hyperglycemia (H)       * blood glucose monitoring test strip    no brand specified    3 Month    by In Vitro route 1- 2 times daily. Use daily to test blood sugar.with kelly contour.    Type 2 diabetes, HbA1c goal < 7% (H)       * blood glucose monitoring test strip    KELLY CONTOUR    100 each    TEST BLOOD SUGAR 1 TIMES A DAY OR AS DIRECTED    Type 2 diabetes mellitus with hyperglycemia, without long-term current use of insulin (H)       canagliflozin 300 MG tablet    INVOKANA    90 tablet    Take 1 tablet (300 mg) by mouth every morning (before breakfast)    Type 2 diabetes mellitus with hyperglycemia, without long-term current use of insulin (H)       DAILY MULTIVITAMIN PO      Take 1 tablet by mouth daily.         desonide 0.05 % cream    DESOWEN    60 g    Apply twice daily for up to 1 week for flares. Then use twice weekly as needed    Dermatitis, seborrheic       glipiZIDE XL 10 MG 24 hr tablet   Generic drug:  glipiZIDE     180 tablet    TAKE 2 BY MOUTH DAILY    Type 2 diabetes mellitus with hyperglycemia, without long-term current use of insulin (H)       ketoconazole 2 % shampoo    NIZORAL    120 mL    Apply to the affected area and wash off after 5 minutes, use three times weekly    Dermatitis, seborrheic       metFORMIN 1000 MG tablet    GLUCOPHAGE    180 tablet    TAKE 1 TABLET TWO TIMES A DAY WITH MEALS    Type 2 diabetes mellitus with hyperglycemia, without long-term current use of insulin (H)       simvastatin 40 MG tablet    ZOCOR    90 tablet    Take 1 tablet (40 mg) by mouth At Bedtime    Hyperlipidemia LDL goal <100       * Notice:  This list has 4 medication(s) that are the same as other medications prescribed for you. Read the directions carefully, and ask your doctor or other care provider to review them with you.

## 2017-07-06 NOTE — PROGRESS NOTES
Your A1C is higher than previous.  Ultimate goal is less than 7%, acceptable range can be up to 8%.  I would recommend you continue your exercise and dietary measures with the current medication.  Recheck with annual exam in the fall is recommended.  If level is higher at this time we may need to consider injectable treatment (insulin or victoza type medication.  Please call or MyChart message me if you have any questions.    FLYK

## 2017-07-06 NOTE — PATIENT INSTRUCTIONS
Long term blood sugar testing today.  Refills will be updated when results return.    Fasting labs due in November.      Preventive Health Recommendations  Male Ages 40 to 49    Yearly exam:             See your health care provider every year in order to  o   Review health changes.   o   Discuss preventive care.    o   Review your medicines if your doctor has prescribed any.    You should be tested each year for STDs (sexually transmitted diseases) if you re at risk.     Have a cholesterol test every 5 years.     Have a colonoscopy (test for colon cancer) if someone in your family has had colon cancer or polyps before age 50.     After age 45, have a diabetes test (fasting glucose). If you are at risk for diabetes, you should have this test every 3 years.      Talk with your health care provider about whether or not a prostate cancer screening test (PSA) is right for you.    Shots: Get a flu shot each year. Get a tetanus shot every 10 years.     Nutrition:    Eat at least 5 servings of fruits and vegetables daily.     Eat whole-grain bread, whole-wheat pasta and brown rice instead of white grains and rice.     Talk to your provider about Calcium and Vitamin D.     Lifestyle    Exercise for at least 150 minutes a week (30 minutes a day, 5 days a week). This will help you control your weight and prevent disease.     Limit alcohol to one drink per day.     No smoking.     Wear sunscreen to prevent skin cancer.     See your dentist every six months for an exam and cleaning.

## 2017-07-06 NOTE — PATIENT INSTRUCTIONS
It was a pleasure to see you at your recent visit in Dermatology.    We will schedule your next follow up appointment however, your appointment may be rescheduled due to any unforseen circumstances.    If you have any questions or concerns, please feel free to contact us at [facility] at [phone].        If you have an urgent skin problem while Dr. Cancino is on maternity leave you may be seen by Dr. Mauricio at [facility] or at the [facility].     They are able to see your current dermatology medical record.      Hours and appointment phone numbers may vary by service.      Dr. Mauricio  [facility]  [address] [place] [address]  [phone]    Directions   [facility] is located at [address] between [address] in [facility], four miles north of [place]. Access to the medical center is from [address] via [address].    Location and directions  From [place] [address]  Take [address] at [place] to [facility] [place]. Go east on [address] [place]. ([address]) one-quarter mile to the stoplight at the intersection with [address] . Turn right. Follow [address] to the stoplight at [facility]. Turn right onto [address]. The medical center is straight ahead.  From the east  Take [address] west to the stoplight at its intersection with [address] one mile west of [place]. Turn right onto [address]/ and then immediately turn left onto [address]. Follow 22 to [place]. When 22 intersects with [address], turn left onto [address]. Go south one mile to the stoplight at [facility]. Turn right onto [address]. to go straight to the medical center.  From the west  Take [address] ([address] east until it crosses over [address] in [place]. At the stoplight, turn right onto [address]. Following [address] one mile to the  "stoplight at [facility] Turn right to get to the medical center.  From the north or south via [address]  Take [address] to [place]. The medical center is located on the south side of [place].  At the stoplight at the intersection of [address] and [facility] turn west onto [facility]. and follow it to the medical center entrance.          [facility] [address] [address]  [phone]    Directions and Parking   Directions  1. [address] to exit [address], and merge into the left turn [address].  2. Turn left on [facility]. The Clinics and Surgery Center will be on your right. Turn into the driveway for  parking service.  Parking   parking:   We encourage patients and visitors to use convenient  parking service at the [facility]. Enter the main arrival plaza from [facility].  cost is a $6 flat rate fee, regardless of your length of visit. Tips are not expected.   Self-parking:   Enter the main arrival plaza at the [facility] from [facility]. From there, parking attendants will direct you to the best self-parking option. Bring your parking tickets inside with you and pay for parking before leaving the Center to get the reduced parking rate.   Parking at [facility]:   Self-parkers who choose to park in the [facility] should enter the ramp on [facility] (one block down from the [facility] main entrance). Use the center anmol designated \"[facility]\" and park on the ground level of [facility]. Please bring your ticket inside with you to pay for parking to receive the [facility] reduced parking rate.  Notice: Monday-Friday, between 7-9 a.m., parking in metered spots on [facility] near the Center is not permitted. Your car will be at risk of being towed during this time.      [facility]  [facility] " [facility]  [address], [address]  [facility] [address]  Hours are:  8AM - 4:30PM M-F  Call to make Appointment : [phone]  with Dr. Chua or Dr. Elizabeth  For more information and parking:  [email or web address]

## 2017-07-12 DIAGNOSIS — E11.65 TYPE 2 DIABETES MELLITUS WITH HYPERGLYCEMIA, WITHOUT LONG-TERM CURRENT USE OF INSULIN (H): ICD-10-CM

## 2017-07-12 NOTE — TELEPHONE ENCOUNTER
blood glucose calibration (DEZ CONTOUR) NORMAL solution      Last Written Prescription Date: 7/6/17  Last Fill Quantity: 2 bottle,  # refills: 0   Last Office Visit with G, P or [facility] prescribing provider: 7/6/17

## 2017-09-22 DIAGNOSIS — E11.65 TYPE 2 DIABETES MELLITUS WITH HYPERGLYCEMIA, WITHOUT LONG-TERM CURRENT USE OF INSULIN (H): ICD-10-CM

## 2017-09-22 DIAGNOSIS — E78.5 HYPERLIPIDEMIA LDL GOAL <100: ICD-10-CM

## 2017-09-22 NOTE — TELEPHONE ENCOUNTER
Reason for Call:  Medication or medication refill:    Do [facility] use a [facility]?  Name of the pharmacy and phone number for the current request:  [facility] [place] - [phone]    Name of the medication requested: canaglifozin (INVOKANA) 300 MG tablet, glipizide (GLIPIZIDE) 10 MG 24 hr tablet    Other request: metformin (GLUCOPHAGE) 1000 MG tablet, and simavastatin (ZOCOR) 40 MG tablet    Can we leave a detailed message on this number? YES    Phone number patient can be reached at: Home number on file [phone] (home)    Best Time: anytime    Call taken on 9/22/2017 at 12:18 PM by Cleve Rivera

## 2017-09-22 NOTE — TELEPHONE ENCOUNTER
canagliflozin (INVOKANA) 300 MG tablet         Last Written Prescription Date: 07/06/17  Last Fill Quantity: 90, # refills: 1  Last Office Visit with [facility], [facility] or [facility] prescribing provider:  07/06/17 Dr. Villa        BP Readings from Last 3 Encounters:   07/06/17 120/86   10/31/16 118/86   04/01/16 130/80     Lab Results   Component Value Date    MICROL 8 11/02/2016     Lab Results   Component Value Date    UMALCR 8.72 11/02/2016     Creatinine   Date Value Ref Range Status   11/02/2016 0.94 0.66 - 1.25 mg/dL Final   ]  GFR Estimate   Date Value Ref Range Status   11/02/2016 88 >60 mL/min/1.7m2 Final     Comment:     Non  GFR Calc   10/08/2015 84 >60 mL/min/1.7m2 Final     Comment:     Non  GFR Calc   02/04/2015 77 >60 mL/min/1.7m2 Final     Comment:     Non  GFR Calc     GFR Estimate If Black   Date Value Ref Range Status   11/02/2016 >90   GFR Calc   >60 mL/min/1.7m2 Final   10/08/2015 >90   GFR Calc   >60 mL/min/1.7m2 Final   02/04/2015 >90   GFR Calc   >60 mL/min/1.7m2 Final     Lab Results   Component Value Date    CHOL 150 11/02/2016     Lab Results   Component Value Date    HDL 23 11/02/2016     Lab Results   Component Value Date    LDL 48 11/02/2016     Lab Results   Component Value Date    TRIG 393 11/02/2016     Lab Results   Component Value Date    CHOLHDLRATIO 6.1 10/08/2015     Lab Results   Component Value Date    AST 23 11/02/2016     Lab Results   Component Value Date    ALT 64 11/02/2016     Lab Results   Component Value Date    A1C 7.4 07/06/2017    A1C 7.0 11/02/2016    A1C 8.0 02/17/2016    A1C 10.5 10/08/2015    A1C 8.0 05/05/2015     Potassium   Date Value Ref Range Status   11/02/2016 4.4 3.4 - 5.3 mmol/L Final       metFORMIN (GLUCOPHAGE) 1000 MG tablet         Last Written Prescription Date: 07 /17Last Fill Quantity: 180, # refills: 1  Last Office Visit with [facility], [facility] or [facility] prescribing  provider:  07/06/17 Dr. Villa        BP Readings from Last 3 Encounters:   07/06/17 120/86   10/31/16 118/86   04/01/16 130/80     Lab Results   Component Value Date    MICROL 8 11/02/2016     Lab Results   Component Value Date    UMALCR 8.72 11/02/2016     Creatinine   Date Value Ref Range Status   11/02/2016 0.94 0.66 - 1.25 mg/dL Final   ]  GFR Estimate   Date Value Ref Range Status   11/02/2016 88 >60 mL/min/1.7m2 Final     Comment:     Non  GFR Calc   10/08/2015 84 >60 mL/min/1.7m2 Final     Comment:     Non  GFR Calc   02/04/2015 77 >60 mL/min/1.7m2 Final     Comment:     Non  GFR Calc     GFR Estimate If Black   Date Value Ref Range Status   11/02/2016 >90   GFR Calc   >60 mL/min/1.7m2 Final   10/08/2015 >90   GFR Calc   >60 mL/min/1.7m2 Final   02/04/2015 >90   GFR Calc   >60 mL/min/1.7m2 Final     Lab Results   Component Value Date    CHOL 150 11/02/2016     Lab Results   Component Value Date    HDL 23 11/02/2016     Lab Results   Component Value Date    LDL 48 11/02/2016     Lab Results   Component Value Date    TRIG 393 11/02/2016     Lab Results   Component Value Date    CHOLHDLRATIO 6.1 10/08/2015     Lab Results   Component Value Date    AST 23 11/02/2016     Lab Results   Component Value Date    ALT 64 11/02/2016     Lab Results   Component Value Date    A1C 7.4 07/06/2017    A1C 7.0 11/02/2016    A1C 8.0 02/17/2016    A1C 10.5 10/08/2015    A1C 8.0 05/05/2015     Potassium   Date Value Ref Range Status   11/02/2016 4.4 3.4 - 5.3 mmol/L Final       glipiZIDE (GLIPIZIDE XL) 10 MG 24 hr tablet         Last Written Prescription Date: 07/06/17  Last Fill Quantity: 180, # refills: 1  Last Office Visit with [facility], [facility] or [facility] prescribing provider:  07/06/17 Dr. Villa        BP Readings from Last 3 Encounters:   07/06/17 120/86   10/31/16 118/86   04/01/16 130/80     Lab Results   Component Value Date     MICROL 8 11/02/2016     Lab Results   Component Value Date    UMALCR 8.72 11/02/2016     Creatinine   Date Value Ref Range Status   11/02/2016 0.94 0.66 - 1.25 mg/dL Final   ]  GFR Estimate   Date Value Ref Range Status   11/02/2016 88 >60 mL/min/1.7m2 Final     Comment:     Non  GFR Calc   10/08/2015 84 >60 mL/min/1.7m2 Final     Comment:     Non  GFR Calc   02/04/2015 77 >60 mL/min/1.7m2 Final     Comment:     Non  GFR Calc     GFR Estimate If Black   Date Value Ref Range Status   11/02/2016 >90   GFR Calc   >60 mL/min/1.7m2 Final   10/08/2015 >90   GFR Calc   >60 mL/min/1.7m2 Final   02/04/2015 >90   GFR Calc   >60 mL/min/1.7m2 Final     Lab Results   Component Value Date    CHOL 150 11/02/2016     Lab Results   Component Value Date    HDL 23 11/02/2016     Lab Results   Component Value Date    LDL 48 11/02/2016     Lab Results   Component Value Date    TRIG 393 11/02/2016     Lab Results   Component Value Date    CHOLHDLRATIO 6.1 10/08/2015     Lab Results   Component Value Date    AST 23 11/02/2016     Lab Results   Component Value Date    ALT 64 11/02/2016     Lab Results   Component Value Date    A1C 7.4 07/06/2017    A1C 7.0 11/02/2016    A1C 8.0 02/17/2016    A1C 10.5 10/08/2015    A1C 8.0 05/05/2015     Potassium   Date Value Ref Range Status   11/02/2016 4.4 3.4 - 5.3 mmol/L Final       simvastatin (ZOCOR) 40 MG tablet     Last Written Prescription Date: 11/02/16  Last Fill Quantity: 90, # refills: 3  Last Office Visit with [facility], [facility] or [facility] prescribing provider: 07/06/17 Dr. Villa       Lab Results   Component Value Date    CHOL 150 11/02/2016     Lab Results   Component Value Date    HDL 23 11/02/2016     Lab Results   Component Value Date    LDL 48 11/02/2016     Lab Results   Component Value Date    TRIG 393 11/02/2016     Lab Results   Component Value Date    CHOLHDLRATIO 6.1 10/08/2015

## 2017-09-26 RX ORDER — SIMVASTATIN 40 MG
40 TABLET ORAL AT BEDTIME
Qty: 90 TABLET | Refills: 0 | Status: SHIPPED | OUTPATIENT
Start: 2017-09-26 | End: 2017-12-27

## 2017-09-26 RX ORDER — GLIPIZIDE 10 MG/1
TABLET, FILM COATED, EXTENDED RELEASE ORAL
Qty: 180 TABLET | Refills: 0 | Status: SHIPPED | OUTPATIENT
Start: 2017-09-26 | End: 2017-12-27

## 2017-09-27 ENCOUNTER — TELEPHONE (OUTPATIENT)
Dept: PHARMACY | Facility: OTHER | Age: 42
End: 2017-09-27

## 2017-09-27 DIAGNOSIS — E11.65 TYPE 2 DIABETES MELLITUS WITH HYPERGLYCEMIA, WITHOUT LONG-TERM CURRENT USE OF INSULIN (H): ICD-10-CM

## 2017-09-27 RX ORDER — DAPAGLIFLOZIN 10 MG/1
10 TABLET, FILM COATED ORAL DAILY
Qty: 90 TABLET | Refills: 0 | Status: SHIPPED | OUTPATIENT
Start: 2017-09-27 | End: 2017-12-27

## 2017-09-27 NOTE — TELEPHONE ENCOUNTER
Invokana 300mg will require a 'Medical Necessity Prior Authorization', otherwise preferred alternatives are: Farxiga or Jardiance    Ins[facility] [employer] [facility]  Insurance Phone: [phone]  Member ID# [id number]    [facility]  [phone]

## 2017-09-27 NOTE — TELEPHONE ENCOUNTER
Please call patient re:  The insurance is not going to cover the invokana.  The Farxiga is a medication that is in the same class and is covered so I have sent this in for him - 10 mg once daily.  FLYK

## 2017-12-27 ENCOUNTER — TELEPHONE (OUTPATIENT)
Dept: FAMILY MEDICINE | Facility: CLINIC | Age: 42
End: 2017-12-27

## 2017-12-27 DIAGNOSIS — E78.5 HYPERLIPIDEMIA LDL GOAL <100: ICD-10-CM

## 2017-12-27 DIAGNOSIS — E11.65 TYPE 2 DIABETES MELLITUS WITH HYPERGLYCEMIA, WITHOUT LONG-TERM CURRENT USE OF INSULIN (H): ICD-10-CM

## 2017-12-27 NOTE — TELEPHONE ENCOUNTER
dapagliflozin (FARXIGA) 10 MG TABS tablet         Last Written Prescription Date: 9/27/17  Last Fill Quantity: 90, # refills: 0  Last Office Visit with [facility], [facility] or [facility] prescribing provider:  7/6/17        BP Readings from Last 3 Encounters:   07/06/17 120/86   10/31/16 118/86   04/01/16 130/80     Lab Results   Component Value Date    MICROL 8 11/02/2016     Lab Results   Component Value Date    UMALCR 8.72 11/02/2016     Creatinine   Date Value Ref Range Status   11/02/2016 0.94 0.66 - 1.25 mg/dL Final   ]  GFR Estimate   Date Value Ref Range Status   11/02/2016 88 >60 mL/min/1.7m2 Final     Comment:     Non  GFR Calc   10/08/2015 84 >60 mL/min/1.7m2 Final     Comment:     Non  GFR Calc   02/04/2015 77 >60 mL/min/1.7m2 Final     Comment:     Non  GFR Calc     GFR Estimate If Black   Date Value Ref Range Status   11/02/2016 >90   GFR Calc   >60 mL/min/1.7m2 Final   10/08/2015 >90   GFR Calc   >60 mL/min/1.7m2 Final   02/04/2015 >90   GFR Calc   >60 mL/min/1.7m2 Final     Lab Results   Component Value Date    CHOL 150 11/02/2016     Lab Results   Component Value Date    HDL 23 11/02/2016     Lab Results   Component Value Date    LDL 48 11/02/2016     Lab Results   Component Value Date    TRIG 393 11/02/2016     Lab Results   Component Value Date    CHOLHDLRATIO 6.1 10/08/2015     Lab Results   Component Value Date    AST 23 11/02/2016     Lab Results   Component Value Date    ALT 64 11/02/2016     Lab Results   Component Value Date    A1C 7.4 07/06/2017    A1C 7.0 11/02/2016    A1C 8.0 02/17/2016    A1C 10.5 10/08/2015    A1C 8.0 05/05/2015     Potassium   Date Value Ref Range Status   11/02/2016 4.4 3.4 - 5.3 mmol/L Final       metFORMIN (GLUCOPHAGE) 1000 MG tablet         Last Written Prescription Date: 9/26/17  Last Fill Quantity: 180, # refills: 0  Last Office Visit with [facility], [facility] or [facility] prescribing provider:   7/6/17        BP Readings from Last 3 Encounters:   07/06/17 120/86   10/31/16 118/86   04/01/16 130/80     Lab Results   Component Value Date    MICROL 8 11/02/2016     Lab Results   Component Value Date    UMALCR 8.72 11/02/2016     Creatinine   Date Value Ref Range Status   11/02/2016 0.94 0.66 - 1.25 mg/dL Final   ]  GFR Estimate   Date Value Ref Range Status   11/02/2016 88 >60 mL/min/1.7m2 Final     Comment:     Non  GFR Calc   10/08/2015 84 >60 mL/min/1.7m2 Final     Comment:     Non  GFR Calc   02/04/2015 77 >60 mL/min/1.7m2 Final     Comment:     Non  GFR Calc     GFR Estimate If Black   Date Value Ref Range Status   11/02/2016 >90   GFR Calc   >60 mL/min/1.7m2 Final   10/08/2015 >90   GFR Calc   >60 mL/min/1.7m2 Final   02/04/2015 >90   GFR Calc   >60 mL/min/1.7m2 Final     Lab Results   Component Value Date    CHOL 150 11/02/2016     Lab Results   Component Value Date    HDL 23 11/02/2016     Lab Results   Component Value Date    LDL 48 11/02/2016     Lab Results   Component Value Date    TRIG 393 11/02/2016     Lab Results   Component Value Date    CHOLHDLRATIO 6.1 10/08/2015     Lab Results   Component Value Date    AST 23 11/02/2016     Lab Results   Component Value Date    ALT 64 11/02/2016     Lab Results   Component Value Date    A1C 7.4 07/06/2017    A1C 7.0 11/02/2016    A1C 8.0 02/17/2016    A1C 10.5 10/08/2015    A1C 8.0 05/05/2015     Potassium   Date Value Ref Range Status   11/02/2016 4.4 3.4 - 5.3 mmol/L Final       glipiZIDE (GLIPIZIDE XL) 10 MG 24 hr tablet         Last Written Prescription Date: 9/26/17  Last Fill Quantity: 180, # refills: 0  Last Office Visit with [facility], [facility] or [facility] prescribing provider:  7/6/17        BP Readings from Last 3 Encounters:   07/06/17 120/86   10/31/16 118/86   04/01/16 130/80     Lab Results   Component Value Date    MICROL 8 11/02/2016     Lab Results    Component Value Date    UMALCR 8.72 11/02/2016     Creatinine   Date Value Ref Range Status   11/02/2016 0.94 0.66 - 1.25 mg/dL Final   ]  GFR Estimate   Date Value Ref Range Status   11/02/2016 88 >60 mL/min/1.7m2 Final     Comment:     Non  GFR Calc   10/08/2015 84 >60 mL/min/1.7m2 Final     Comment:     Non  GFR Calc   02/04/2015 77 >60 mL/min/1.7m2 Final     Comment:     Non  GFR Calc     GFR Estimate If Black   Date Value Ref Range Status   11/02/2016 >90   GFR Calc   >60 mL/min/1.7m2 Final   10/08/2015 >90   GFR Calc   >60 mL/min/1.7m2 Final   02/04/2015 >90   GFR Calc   >60 mL/min/1.7m2 Final     Lab Results   Component Value Date    CHOL 150 11/02/2016     Lab Results   Component Value Date    HDL 23 11/02/2016     Lab Results   Component Value Date    LDL 48 11/02/2016     Lab Results   Component Value Date    TRIG 393 11/02/2016     Lab Results   Component Value Date    CHOLHDLRATIO 6.1 10/08/2015     Lab Results   Component Value Date    AST 23 11/02/2016     Lab Results   Component Value Date    ALT 64 11/02/2016     Lab Results   Component Value Date    A1C 7.4 07/06/2017    A1C 7.0 11/02/2016    A1C 8.0 02/17/2016    A1C 10.5 10/08/2015    A1C 8.0 05/05/2015     Potassium   Date Value Ref Range Status   11/02/2016 4.4 3.4 - 5.3 mmol/L Final       simvastatin (ZOCOR) 40 MG tablet     Last Written Prescription Date: 9/26/17  Last Fill Quantity: 90, # refills: 0  Last Office Visit with [facility], [facility] or [facility] prescribing provider: 7/6/17       Lab Results   Component Value Date    CHOL 150 11/02/2016     Lab Results   Component Value Date    HDL 23 11/02/2016     Lab Results   Component Value Date    LDL 48 11/02/2016     Lab Results   Component Value Date    TRIG 393 11/02/2016     Lab Results   Component Value Date    CHOLHDLRATIO 6.1 10/08/2015

## 2017-12-29 RX ORDER — SIMVASTATIN 40 MG
40 TABLET ORAL AT BEDTIME
Qty: 30 TABLET | Refills: 0 | Status: SHIPPED | OUTPATIENT
Start: 2017-12-29 | End: 2018-02-01

## 2017-12-29 RX ORDER — GLIPIZIDE 10 MG/1
TABLET, FILM COATED, EXTENDED RELEASE ORAL
Qty: 60 TABLET | Refills: 0 | Status: SHIPPED | OUTPATIENT
Start: 2017-12-29 | End: 2018-02-01

## 2017-12-29 RX ORDER — DAPAGLIFLOZIN 10 MG/1
10 TABLET, FILM COATED ORAL DAILY
Qty: 30 TABLET | Refills: 0 | Status: SHIPPED | OUTPATIENT
Start: 2017-12-29 | End: 2018-02-01

## 2017-12-29 NOTE — TELEPHONE ENCOUNTER
Given 30 day prescription .  Overdue for follow up with PCP - no lipids since 11/2016.  No metabolic panel since 11/2016.  At last appointment recommended follow up with her in the fall.

## 2017-12-29 NOTE — TELEPHONE ENCOUNTER
Requested Prescriptions   Pending Prescriptions Disp Refills     metFORMIN (GLUCOPHAGE) 1000 MG tablet 180 tablet 0     Sig: TAKE 1 TABLET TWO TIMES A DAY WITH MEALS    Biguanide Agents Failed    12/29/2017  9:50 AM       Failed - Patient has documented LDL within the past 12 mos.    Recent Labs   Lab Test  11/02/16   0824   LDL  48            Failed - Patient has had a Microalbumin in the past 12 mos.    Recent Labs   Lab Test  11/02/16   0824   MICROL  8   UMALCR  8.72            Passed - Patient's BP is less than 140/90    BP Readings from Last 3 Encounters:   07/06/17 120/86   10/31/16 118/86   04/01/16 130/80                Passed - Patient is age 10 or older       Passed - Patient has documented A1c within the specified period of time.    Recent Labs   Lab Test  07/06/17   1330   A1C  7.4*            Passed - Patient's CR is NOT>1.4 OR Patient's EGFR is NOT<45 within past 12 mos.    Recent Labs   Lab Test  11/02/16   0824   GFRESTIMATED  88   GFRESTBLACK  >90   GFR Calc         Recent Labs   Lab Test  11/02/16   0824   CR  0.94            Passed - Patient does NOT have a diagnosis of CHF.       Passed - Recent (6 mos) or future visit with authorizing provider's specialty    Patient had office visit in the last 6 months or has a visit in the next 30 days with authorizing provider.  See chart review.             simvastatin (ZOCOR) 40 MG tablet 90 tablet 0     Sig: Take 1 tablet (40 mg) by mouth At Bedtime    Statins Protocol Failed    12/29/2017  9:50 AM       Failed - LDL on file in past 12 months    Recent Labs   Lab Test  11/02/16   0824   LDL  48            Passed - No abnormal creatine kinase in past 12 months    No lab results found.         Passed - Recent or future visit with authorizing provider    Patient had office visit in the last year or has a visit in the next 30 days with authorizing provider.  See chart review.              Passed - Patient is age 18 or older        glipiZIDE  (GLIPIZIDE XL) 10 MG 24 hr tablet 180 tablet 0     Sig: TAKE 2 BY MOUTH DAILY    Sulfonylurea Agents Failed    12/29/2017  9:50 AM       Failed - Patient has documented LDL within the past 12 mos.    Recent Labs   Lab Test  11/02/16   0824   LDL  48            Failed - Patient has had a Microalbumin in the past 12 mos.    Recent Labs   Lab Test  11/02/16   0824   MICROL  8   UMALCR  8.72            Failed - Patient has a recent creatinine (normal) within the past 12 mos.    Recent Labs   Lab Test  11/02/16   0824   CR  0.94            Passed - Patient's BP is less than 140/90    BP Readings from Last 3 Encounters:   07/06/17 120/86   10/31/16 118/86   04/01/16 130/80                Passed - Patient has documented A1c within the specified period of time.    Recent Labs   Lab Test  07/06/17   1330   A1C  7.4*            Passed - Patient is age 18 or older       Passed - Patient has had an appointment with authorizing provider within the past 6 mos. or  within next 30 days    Patient had office visit in the last 6 months or has a visit in the next 30 days with authorizing provider.  See chart review.             dapagliflozin (FARXIGA) 10 MG TABS tablet 90 tablet 0     Sig: Take 1 tablet (10 mg) by mouth daily    Sodium Glucose Co-Transport Inhibitor Agents Failed    12/29/2017  9:50 AM       Failed - Patient has documented LDL within the past 12 mos.    Recent Labs   Lab Test  11/02/16   0824   LDL  48            Failed - Patient has had a Microalbumin in the past 12 mos.    Recent Labs   Lab Test  11/02/16   0824   MICROL  8   UMALCR  8.72            Failed - Patient has documented normal Potassium within the last 12 mos.    Recent Labs   Lab Test  11/02/16   0824   POTASSIUM  4.4            Passed - Patient's BP is less than 140/90    BP Readings from Last 3 Encounters:   07/06/17 120/86   10/31/16 118/86   04/01/16 130/80                Passed - Patient has documented A1c within the specified period of time.     Recent Labs   Lab Test  07/06/17   1330   A1C  7.4*            Passed - No creatinine >1.4 or GFR <45 within the past 12 mos    Recent Labs   Lab Test  11/02/16   0824   GFRESTIMATED  88   GFRESTBLACK  >90   GFR Calc         Recent Labs   Lab Test  11/02/16   0824   CR  0.94            Passed - Patient is age 18 or older       Passed - Patient has had an appointment within the past 6 mos.or has a future appt within the next 30 days    Patient had office visit in the last 6 months or has a visit in the next 30 days with authorizing provider.  See chart review.             Routing refill request to provider for review/approval because:  Labs out of range  Labs not current  Failed protocol. Requesting promptly, before noon due to insurance coverage.   Steph Coon RN

## 2017-12-29 NOTE — TELEPHONE ENCOUNTER
Reason for Call:  Other prescription    Detailed comments: Patient called because of new insurance year this must be refilled by noon 12/29.     Phone Number Patient can be reached at: Home number on file [phone] (home)    Best Time: any    Can we leave a detailed message on this number? YES    Call taken on 12/29/2017 at 9:33 AM by Sophie Ndiaye

## 2018-01-18 ENCOUNTER — DOCUMENTATION ONLY (OUTPATIENT)
Dept: LAB | Facility: CLINIC | Age: 43
End: 2018-01-18

## 2018-01-18 DIAGNOSIS — E11.65 TYPE 2 DIABETES MELLITUS WITH HYPERGLYCEMIA, WITHOUT LONG-TERM CURRENT USE OF INSULIN (H): Primary | ICD-10-CM

## 2018-01-18 DIAGNOSIS — R74.8 ELEVATED LIVER ENZYMES: ICD-10-CM

## 2018-01-18 DIAGNOSIS — E78.5 HYPERLIPIDEMIA LDL GOAL <100: ICD-10-CM

## 2018-01-18 NOTE — PROGRESS NOTES
Please place or confirm lab orders for upcoming lab appointment on 1/29/2018, Physician appointment on 2/01/2018. ELI Betancur CMA.

## 2018-01-29 DIAGNOSIS — E11.65 TYPE 2 DIABETES MELLITUS WITH HYPERGLYCEMIA, WITHOUT LONG-TERM CURRENT USE OF INSULIN (H): ICD-10-CM

## 2018-01-29 DIAGNOSIS — R74.8 ELEVATED LIVER ENZYMES: ICD-10-CM

## 2018-01-29 DIAGNOSIS — E78.5 HYPERLIPIDEMIA LDL GOAL <100: ICD-10-CM

## 2018-01-29 LAB
ALBUMIN SERPL-MCNC: 4.4 G/DL (ref 3.4–5)
ALP SERPL-CCNC: 96 U/L (ref 40–150)
ALT SERPL W P-5'-P-CCNC: 81 U/L (ref 0–70)
ANION GAP SERPL CALCULATED.3IONS-SCNC: 7 MMOL/L (ref 3–14)
AST SERPL W P-5'-P-CCNC: 23 U/L (ref 0–45)
BILIRUB SERPL-MCNC: 0.5 MG/DL (ref 0.2–1.3)
BUN SERPL-MCNC: 17 MG/DL (ref 7–30)
CALCIUM SERPL-MCNC: 9.4 MG/DL (ref 8.5–10.1)
CHLORIDE SERPL-SCNC: 106 MMOL/L (ref 94–109)
CHOLEST SERPL-MCNC: 167 MG/DL
CO2 SERPL-SCNC: 28 MMOL/L (ref 20–32)
CREAT SERPL-MCNC: 0.93 MG/DL (ref 0.66–1.25)
CREAT UR-MCNC: 114 MG/DL
GFR SERPL CREATININE-BSD FRML MDRD: 88 ML/MIN/1.7M2
GLUCOSE SERPL-MCNC: 216 MG/DL (ref 70–99)
HBA1C MFR BLD: 7.3 % (ref 4.3–6)
HDLC SERPL-MCNC: 35 MG/DL
LDLC SERPL CALC-MCNC: 88 MG/DL
MICROALBUMIN UR-MCNC: 11 MG/L
MICROALBUMIN/CREAT UR: 9.65 MG/G CR (ref 0–17)
NONHDLC SERPL-MCNC: 132 MG/DL
POTASSIUM SERPL-SCNC: 4.5 MMOL/L (ref 3.4–5.3)
PROT SERPL-MCNC: 7.3 G/DL (ref 6.8–8.8)
SODIUM SERPL-SCNC: 141 MMOL/L (ref 133–144)
TRIGL SERPL-MCNC: 218 MG/DL
TSH SERPL DL<=0.005 MIU/L-ACNC: 1.93 MU/L (ref 0.4–4)

## 2018-01-29 PROCEDURE — 84443 ASSAY THYROID STIM HORMONE: CPT | Performed by: FAMILY MEDICINE

## 2018-01-29 PROCEDURE — 36415 COLL VENOUS BLD VENIPUNCTURE: CPT | Performed by: FAMILY MEDICINE

## 2018-01-29 PROCEDURE — 80061 LIPID PANEL: CPT | Performed by: FAMILY MEDICINE

## 2018-01-29 PROCEDURE — 80053 COMPREHEN METABOLIC PANEL: CPT | Performed by: FAMILY MEDICINE

## 2018-01-29 PROCEDURE — 83036 HEMOGLOBIN GLYCOSYLATED A1C: CPT | Performed by: FAMILY MEDICINE

## 2018-01-29 PROCEDURE — 82043 UR ALBUMIN QUANTITATIVE: CPT | Performed by: FAMILY MEDICINE

## 2018-01-30 NOTE — PROGRESS NOTES
Your urine testing is normal.  There is not elevated protein present.   Your cholesterol panel is improved from previous.  The HDL remains low and the triglyceride level remains high.  This is best treated with dietary restriction of carbs and exercise.  The LDL, bad cholesterol, is under good control - continue the simvastatin for this.    Your thyroid testing is normal.  Your kidney is normal.  Liver testing are normal with exception of one borderline elevated level.  This has been elevated sporadically in past.   Your long term blood sugar testing is similar to previous.  It is in acceptable range.  Ideal range is less than 7%.  We can discuss this more at your upcoming appointment.  Please call or MyChart message me if you have any questions.   PSK

## 2018-02-01 ENCOUNTER — OFFICE VISIT (OUTPATIENT)
Dept: FAMILY MEDICINE | Facility: CLINIC | Age: 43
End: 2018-02-01
Payer: COMMERCIAL

## 2018-02-01 VITALS
OXYGEN SATURATION: 94 % | WEIGHT: 221.5 LBS | SYSTOLIC BLOOD PRESSURE: 126 MMHG | BODY MASS INDEX: 33.57 KG/M2 | HEART RATE: 84 BPM | DIASTOLIC BLOOD PRESSURE: 80 MMHG | RESPIRATION RATE: 18 BRPM | TEMPERATURE: 98.3 F | HEIGHT: 68 IN

## 2018-02-01 DIAGNOSIS — Z00.00 ROUTINE GENERAL MEDICAL EXAMINATION AT A HEALTH CARE FACILITY: Primary | ICD-10-CM

## 2018-02-01 DIAGNOSIS — E78.5 HYPERLIPIDEMIA LDL GOAL <100: ICD-10-CM

## 2018-02-01 DIAGNOSIS — E11.65 TYPE 2 DIABETES MELLITUS WITH HYPERGLYCEMIA, WITHOUT LONG-TERM CURRENT USE OF INSULIN (H): ICD-10-CM

## 2018-02-01 DIAGNOSIS — Z23 NEED FOR PROPHYLACTIC VACCINATION AND INOCULATION AGAINST INFLUENZA: ICD-10-CM

## 2018-02-01 DIAGNOSIS — K21.9 GASTROESOPHAGEAL REFLUX DISEASE WITHOUT ESOPHAGITIS: ICD-10-CM

## 2018-02-01 PROCEDURE — 90686 IIV4 VACC NO PRSV 0.5 ML IM: CPT | Performed by: FAMILY MEDICINE

## 2018-02-01 PROCEDURE — 99396 PREV VISIT EST AGE 40-64: CPT | Mod: 25 | Performed by: FAMILY MEDICINE

## 2018-02-01 PROCEDURE — 90471 IMMUNIZATION ADMIN: CPT | Performed by: FAMILY MEDICINE

## 2018-02-01 RX ORDER — GLIPIZIDE 10 MG/1
TABLET, FILM COATED, EXTENDED RELEASE ORAL
Qty: 180 TABLET | Refills: 1 | Status: SHIPPED | OUTPATIENT
Start: 2018-02-01 | End: 2018-06-20

## 2018-02-01 RX ORDER — SIMVASTATIN 40 MG
40 TABLET ORAL AT BEDTIME
Qty: 90 TABLET | Refills: 3 | Status: SHIPPED | OUTPATIENT
Start: 2018-02-01 | End: 2019-01-16

## 2018-02-01 RX ORDER — DAPAGLIFLOZIN 10 MG/1
10 TABLET, FILM COATED ORAL DAILY
Qty: 90 TABLET | Refills: 1 | Status: SHIPPED | OUTPATIENT
Start: 2018-02-01 | End: 2018-06-20

## 2018-02-01 ASSESSMENT — PAIN SCALES - GENERAL: PAINLEVEL: NO PAIN (0)

## 2018-02-01 NOTE — NURSING NOTE
"Chief Complaint   Patient presents with     Physical     pt did blood work already     Refill Request       Initial /80 (BP Location: Right arm, Patient Position: Sitting, Cuff Size: Adult Regular)  Pulse 84  Temp 98.3  F (36.8  C) (Oral)  Resp 18  Ht 1.727 m (5' 8\")  Wt 100.5 kg (221 lb 8 oz)  SpO2 94%  BMI 33.68 kg/m2 Estimated body mass index is 33.68 kg/(m^2) as calculated from the following:    Height as of this encounter: 1.727 m (5' 8\").    Weight as of this encounter: 100.5 kg (221 lb 8 oz).  Medication Reconciliation: complete     Shanna Queen      "

## 2018-02-01 NOTE — PROGRESS NOTES
SUBJECTIVE:   CC: Jose Crews is an 42 year old male who presents for preventative health visit.     Healthy Habits:    Do you get at least three servings of calcium containing foods daily (dairy, green leafy vegetables, etc.)? yes    Amount of exercise or daily activities, outside of work: 5 day(s) per week  - walking    Problems taking medications regularly No    Medication side effects: No    Have you had an eye exam in the past two years? yes    Do you see a dentist twice per year? yes    Do you have sleep apnea, excessive snoring or daytime drowsiness?no       Today's PHQ-2 Score:   PHQ-2 ( 1999 [employer]) 2/1/2018 7/6/2017   Q1: Little interest or pleasure in doing things 0 0   Q2: Feeling down, depressed or hopeless 0 0   PHQ-2 Score 0 0       Abuse: Current or Past(Physical, Sexual or Emotional)- No  Do you feel safe in your environment - Yes    Social History   Substance Use Topics     Smoking status: Former Smoker     Quit date: 1/1/2000     Smokeless tobacco: Never Used     Alcohol use Yes      Comment: occassionally      If you drink alcohol do you typically have >3 drinks per day or >7 drinks per week? Yes - AUDIT SCORE:     occasionally                      Last PSA: No results found for: PSA    Reviewed orders with patient. Reviewed health maintenance and updated orders accordingly - Yes  BP Readings from Last 3 Encounters:   02/01/18 126/80   07/06/17 120/86   10/31/16 118/86    Wt Readings from Last 3 Encounters:   02/01/18 100.5 kg (221 lb 8 oz)   07/06/17 99.3 kg (219 lb)   10/31/16 99.3 kg (219 lb)                  Diabetes Follow-up      Patient is checking blood sugars: not at all    Diabetic concerns: None     Symptoms of hypoglycemia (low blood sugar): none     Paresthesias (numbness or burning in feet) or sores: No     Date of last diabetic eye exam: April 2017    BP Readings from Last 2 Encounters:   02/01/18 126/80   07/06/17 120/86     Hemoglobin A1C (%)   Date Value   01/29/2018 7.3 (H)    07/06/2017 7.4 (H)     LDL Cholesterol Calculated (mg/dL)   Date Value   01/29/2018 88   11/02/2016 48     Hyperlipidemia Follow-Up      Rate your low fat/cholesterol diet?: good    Taking statin?  Yes, no muscle aches from statin    Other lipid medications/supplements?:  none      Jose comes to the clinic today for an annual physical exam. Labs were done prior to this appointment on 01/29/18, and were discussed with the patient.  He walks about four blocks total to the bus/train carrying a heavy bag to get to work for the past seven month with toleration.       Reviewed and updated as needed this visit by clinical staff  Tobacco  Allergies  Meds  Med Hx  Surg Hx  Fam Hx  Soc Hx          Reviewed and updated as needed this visit by Provider  Tobacco  Allergies  Meds  Med Hx  Surg Hx  Fam Hx  Soc Hx       Past Medical History:   Diagnosis Date     Acne vulgaris      Balanitis 2/23/2009     History of atypical/dysplastic nevus 03/2010    back with positive margins.     MEDICAL HISTORY OF - age 12    hospitalized for a week with unexplained abdominal pain and vomiting     Type II or unspecified type diabetes mellitus without mention of complication, not stated as uncontrolled       Past Surgical History:   Procedure Laterality Date     HC TOOTH EXTRACTION W/FORCEP  2006       ROS:  10 point ROS of systems including Constitutional, Eyes, Respiratory, Cardiovascular, Gastroenterology, Genitourinary, Integumentary, Muscularskeletal, Psychiatric were all negative except for pertinent positives noted in my HPI.    POS: GERD with spicy foods.   POS: Scattered moles without irritation. Moles are monitored yearly with Dermatology. A cream was prescribed for rashes.     This document serves as a record of the services and decisions personally performed and made by Steffanie Villa MD. It was created on her behalf by Alvin London, a trained medical scribe. The creation of this document is based on the provider's  "statements to the medical scribe.  Alvin EDGARLizy London  February 1, 2018          OBJECTIVE:   /80 (BP Location: Right arm, Patient Position: Sitting, Cuff Size: Adult Regular)  Pulse 84  Temp 98.3  F (36.8  C) (Oral)  Resp 18  Ht 1.727 m (5' 8\")  Wt 100.5 kg (221 lb 8 oz)  SpO2 94%  BMI 33.68 kg/m2  EXAM:  GENERAL: alert, no distress and obese  EYES: Eyes grossly normal to inspection, PERRL and conjunctivae and sclerae normal  HENT: ear canals and TM's normal, nose and mouth without ulcers or lesions  NECK: no adenopathy, no asymmetry, masses, or scars and thyroid normal to palpation  RESP: lungs clear to auscultation - no rales, rhonchi or wheezes  CV: regular rate and rhythm, normal S1 S2, no S3 or S4, no murmur, click or rub, no peripheral edema and peripheral pulses strong  ABDOMEN: soft, nontender, no hepatosplenomegaly, no masses and bowel sounds normal   (male): normal male genitalia without lesions or urethral discharge, no hernia  MS: no gross musculoskeletal defects noted, no edema  SKIN: no suspicious lesions or rashes  NEURO: Normal strength and tone, mentation intact and speech normal  PSYCH: mentation appears normal, affect normal/bright    ASSESSMENT/PLAN:   1. Routine general medical examination at a health care facility  Screenings up to date.    Your urine testing is normal.  There is not elevated protein present.   Your cholesterol panel is improved from previous.  The HDL remains low and the triglyceride level remains high.  This is best treated with dietary restriction of carbs and exercise.  The LDL, bad cholesterol, is under good control - continue the simvastatin for this.     Your thyroid testing is normal.   Your kidney is normal.  Liver testing are normal with exception of one borderline elevated level.  This has been elevated sporadically in past.   Your long term blood sugar testing is similar to previous.  It is in acceptable range.  Ideal range is less than 7%.  We can " "discuss this more at your upcoming appointment.   Please call or MyChart message me if you have any questions.    PSK    2. Type 2 diabetes mellitus with hyperglycemia, without long-term current use of insulin (H)  Refills sent.  Follow up in 6 months.   - metFORMIN (GLUCOPHAGE) 1000 MG tablet; TAKE 1 TABLET TWO TIMES A DAY WITH MEALS  Dispense: 180 tablet; Refill: 1  - glipiZIDE (GLIPIZIDE XL) 10 MG 24 hr tablet; TAKE 2 BY MOUTH DAILY  Dispense: 180 tablet; Refill: 1  - dapagliflozin (FARXIGA) 10 MG TABS tablet; Take 1 tablet (10 mg) by mouth daily  Dispense: 90 tablet; Refill: 1    3. Hyperlipidemia LDL goal <100  Refills.  As above.   - simvastatin (ZOCOR) 40 MG tablet; Take 1 tablet (40 mg) by mouth At Bedtime  Dispense: 90 tablet; Refill: 3    4. Need for prophylactic vaccination and inoculation against influenza  - FLU VAC, SPLIT VIRUS IM > 3 YO (QUADRIVALENT) [47766]  - Vaccine Administration, Initial [51832]    5. Gastroesophageal reflux disease without esophagitis  Prn medication.  Follow up if persistent symptoms.          COUNSELING:  Reviewed preventive health counseling, as reflected in patient instructions       Regular exercise       Healthy diet/nutrition       Vision screening       Hearing screening       Immunizations    Vaccinated for: Influenza             reports that he quit smoking about 18 years ago. He has never used smokeless tobacco.    Estimated body mass index is 33.68 kg/(m^2) as calculated from the following:    Height as of this encounter: 1.727 m (5' 8\").    Weight as of this encounter: 100.5 kg (221 lb 8 oz).   Weight management plan: Discussed healthy diet and exercise guidelines and patient will follow up in 6 months in clinic to re-evaluate.    Counseling Resources:  ATP IV Guidelines  Pooled Cohorts Equation Calculator  FRAX Risk Assessment  ICSI Preventive Guidelines  Dietary Guidelines for Americans, 2010  USDA's MyPlate  ASA Prophylaxis  Lung CA Screening    The information " in this document, created by the medical scribe for me, accurately reflects the services I personally performed and the decisions made by me. I have reviewed and approved this document for accuracy prior to leaving the patient care area.  February 1, 2018 9:04 AM      Steffanie Villa MD  [facility]    Patient Instructions     Flu Shot today!    Refills to medications sent to pharmacy today.     Follow up in six month for Diabetes check.

## 2018-02-01 NOTE — PROGRESS NOTES

## 2018-02-01 NOTE — MR AVS SNAPSHOT
After Visit Summary   2/1/2018    Jose Crews    MRN: [id number]           Patient Information     Date Of Birth          1975        Visit Information        Provider Department      2/1/2018 8:00 AM Steffanie Villa MD [facility]        Today's Diagnoses     Need for prophylactic vaccination and inoculation against influenza    -  1    Type 2 diabetes mellitus with hyperglycemia, without long-term current use of insulin (H)        Hyperlipidemia LDL goal <100          Care Instructions    Flu Shot today!    Refills to medications sent to pharmacy today.     Follow up in six month for Diabetes check.       Preventive Health Recommendations  Male Ages 40 to 49    Yearly exam:             See your health care provider every year in order to  o   Review health changes.   o   Discuss preventive care.    o   Review your medicines if your doctor has prescribed any.    You should be tested each year for STDs (sexually transmitted diseases) if you re at risk.     Have a cholesterol test every 5 years.     Have a colonoscopy (test for colon cancer) if someone in your family has had colon cancer or polyps before age 50.     After age 45, have a diabetes test (fasting glucose). If you are at risk for diabetes, you should have this test every 3 years.      Talk with your health care provider about whether or not a prostate cancer screening test (PSA) is right for you.    Shots: Get a flu shot each year. Get a tetanus shot every 10 years.     Nutrition:    Eat at least 5 servings of fruits and vegetables daily.     Eat whole-grain bread, whole-wheat pasta and brown rice instead of white grains and rice.     Talk to your provider about Calcium and Vitamin D.     Lifestyle    Exercise for at least 150 minutes a week (30 minutes a day, 5 days a week). This will help you control your weight and prevent disease.     Limit alcohol to one drink per day.     No smoking.     Wear sunscreen to  prevent skin cancer.     See your dentist every six months for an exam and cleaning.      At [facility], we strive to deliver an exceptional experience to you, every time we see you.  If you receive a survey in the mail, please send us back your thoughts. We really do value your feedback.    Based on your medical history, these are the current health maintenance/preventive care services that you are due for (some may have been done at this visit.)  Health Maintenance Due   Topic Date Due     INFLUENZA VACCINE (SYSTEM ASSIGNED)  09/01/2017         Suggested websites for health information:  Www.[employer].org : Up to date and easily searchable information on multiple topics.  Www.[employer].gov : medication info, interactive tutorials, watch real surgeries online  Www.familydoctor.org : good info from the Academy of Family Physicians  Www.cdc.gov : public health info, travel advisories, epidemics (H1N1)  Www.aap.org : children's health info, normal development, vaccinations  Www.health.[facility].[place].us : [place] dept of health, public health issues in [place], N1N1    Your care team:     Family Medicine   AKASH Browne MD Emily Bunt, NGA CNP   S. MD Steffanie Garvin MD Angela Wermerskirchen, MD         Clinic hours: Monday - Wednesday 7 am-7 pm   Thursdays and Fridays 7 am-5 pm.     [facility]: Monday - Friday 11 am-9 pm,   Saturday and Sunday 9 am-5 pm.    [facility]: Monday -Thursday 8 am-8 pm; Friday 8 am-6 pm; Saturday and Sunday 9 am-5 pm.     [facility]: Monday - Thursday 8 am - 7 pm; Friday 8 am - 6 pm    Clinic: [phone]   [facility]: [phone]   [facility]: [phone]                  Follow-ups after your visit        Your next 10 appointments already scheduled     Jul 10, 2018  8:45 AM CDT   Return Visit with Klarissa Cancino MD   [facility]  "[facility]    [address] [address] [place] [address] [phone]              Who to contact     If you have questions or need follow up information about today's clinic visit or your schedule please contact [facility] directly at [phone].  Normal or non-critical lab and imaging results will be communicated to you by MyChart, letter or phone within 4 business days after the clinic has received the results. If you do not hear from us within 7 days, please contact the clinic through [employer] or phone. If you have a critical or abnormal lab result, we will notify you by phone as soon as possible.  Submit refill requests through [employer] or call your pharmacy and they will forward the refill request to us. Please allow 3 business days for your refill to be completed.          Additional Information About Your Visit        MyChart Information     [employer] gives you secure access to your electronic health record. If you see a primary care provider, you can also send messages to your care team and make appointments. If you have questions, please call your primary care clinic.  If you do not have a primary care provider, please call [phone] and they will assist you.        Care EveryWhere ID     This is your Care EveryWhere ID. This could be used by other organizations to access your [place] medical records  [id number]        Your Vitals Were     Pulse Temperature Respirations Height Pulse Oximetry BMI (Body Mass Index)    84 98.3  F (36.8  C) (Oral) 18 1.727 m (5' 8\") 94% 33.68 kg/m2       Blood Pressure from Last 3 Encounters:   02/01/18 126/80   07/06/17 120/86   10/31/16 118/86    Weight from Last 3 Encounters:   02/01/18 100.5 kg (221 lb 8 oz)   07/06/17 99.3 kg (219 lb)   10/31/16 99.3 kg (219 lb)              Today, you had the following     No orders found for display         Where to get your medicines      These medications were sent to [facility] - [place], [place] - [address] AT Portal to Registered [facility] Sites  [address], [facility] [address]     Phone:  [phone]     dapagliflozin 10 MG Tabs tablet    glipiZIDE 10 MG 24 hr tablet    metFORMIN 1000 MG tablet    simvastatin 40 MG tablet          Primary Care Provider Office Phone # Fax #    Steffanie Villa [phone] [address]  [facility] [address]        Equal Access to Services     JOSELYN PONCE : Hadii aad ku hadasho Soomaali, waaxda luqadaha, qaybta kaalmada adeegyada, waxay idiin hayaan adeeg kharash ladarrius . So [facility] [phone].    ATENCIÓN: Si habla español, tiene a graham disposición servicios gratuitos de asistencia lingüística. [facility] [phone].    We comply with applicable federal civil rights laws and [place] laws. We do not discriminate on the basis of race, color, national origin, age, disability, sex, sexual orientation, or gender identity.            Thank you!     Thank you for choosing [facility]  for your care. Our goal is always to provide you with excellent care. Hearing back from our patients is one way we can continue to improve our services. Please take a few minutes to complete the written survey that you may receive in the mail after your visit with us. Thank you!             Your Updated Medication List - Protect others around you: Learn how to safely use, store and throw away your medicines at www.disposemymeds.org.          This list is accurate as of 2/1/18  8:35 AM.  Always use your most recent med list.                   Brand Name Dispense Instructions for use Diagnosis    * ACE/ARB/ARNI NOT PRESCRIBED (INTENTIONAL)      by Other route continuous prn.    Type 2 diabetes, HbA1c goal < 7% (H)       * ARB NOT PRESCRIBED (INTENTIONAL)      by Other route continuous prn.    Type 2 diabetes, HbA1c goal < 7% (H)       aspirin 81 MG tablet      Take 1 tablet by mouth daily. *        blood glucose  calibration NORMAL solution     2 Bottle    Use to calibrate blood glucose monitor as directed.    Type 2 diabetes mellitus with hyperglycemia, without long-term current use of insulin (H)       blood glucose monitoring lancets     100 each    1 each daily    Type 2 diabetes mellitus with hyperglycemia, without long-term current use of insulin (H)       * blood glucose monitoring test strip    no brand specified    3 Month    by In Vitro route 1- 2 times daily. Use daily to test blood sugar.with kelly contour.    Type 2 diabetes, HbA1c goal < 7% (H)       * blood glucose monitoring test strip    KELLY CONTOUR    100 each    TEST BLOOD SUGAR 1 TIMES A DAY OR AS DIRECTED    Type 2 diabetes mellitus with hyperglycemia, without long-term current use of insulin (H)       DAILY MULTIVITAMIN PO      Take 1 tablet by mouth daily.        dapagliflozin 10 MG Tabs tablet    FARXIGA    90 tablet    Take 1 tablet (10 mg) by mouth daily    Type 2 diabetes mellitus with hyperglycemia, without long-term current use of insulin (H)       desonide 0.05 % cream    DESOWEN    60 g    Apply twice daily for up to 1 week for flares. Then use twice weekly as needed    Dermatitis, seborrheic       glipiZIDE 10 MG 24 hr tablet    glipiZIDE XL    180 tablet    TAKE 2 BY MOUTH DAILY    Type 2 diabetes mellitus with hyperglycemia, without long-term current use of insulin (H)       ketoconazole 2 % shampoo    NIZORAL    120 mL    Apply to the affected area and wash off after 5 minutes, use three times weekly    Dermatitis, seborrheic       metFORMIN 1000 MG tablet    GLUCOPHAGE    180 tablet    TAKE 1 TABLET TWO TIMES A DAY WITH MEALS    Type 2 diabetes mellitus with hyperglycemia, without long-term current use of insulin (H)       simvastatin 40 MG tablet    ZOCOR    90 tablet    Take 1 tablet (40 mg) by mouth At Bedtime    Hyperlipidemia LDL goal <100       * Notice:  This list has 4 medication(s) that are the same as other medications  prescribed for you. Read the directions carefully, and ask your doctor or other care provider to review them with you.

## 2018-02-01 NOTE — PATIENT INSTRUCTIONS
Flu Shot today!    Refills to medications sent to pharmacy today.     Follow up in six month for Diabetes check.       Preventive Health Recommendations  Male Ages 40 to 49    Yearly exam:             See your health care provider every year in order to  o   Review health changes.   o   Discuss preventive care.    o   Review your medicines if your doctor has prescribed any.    You should be tested each year for STDs (sexually transmitted diseases) if you re at risk.     Have a cholesterol test every 5 years.     Have a colonoscopy (test for colon cancer) if someone in your family has had colon cancer or polyps before age 50.     After age 45, have a diabetes test (fasting glucose). If you are at risk for diabetes, you should have this test every 3 years.      Talk with your health care provider about whether or not a prostate cancer screening test (PSA) is right for you.    Shots: Get a flu shot each year. Get a tetanus shot every 10 years.     Nutrition:    Eat at least 5 servings of fruits and vegetables daily.     Eat whole-grain bread, whole-wheat pasta and brown rice instead of white grains and rice.     Talk to your provider about Calcium and Vitamin D.     Lifestyle    Exercise for at least 150 minutes a week (30 minutes a day, 5 days a week). This will help you control your weight and prevent disease.     Limit alcohol to one drink per day.     No smoking.     Wear sunscreen to prevent skin cancer.     See your dentist every six months for an exam and cleaning.      At [facility], we strive to deliver an exceptional experience to you, every time we see you.  If you receive a survey in the mail, please send us back your thoughts. We really do value your feedback.    Based on your medical history, these are the current health maintenance/preventive care services that you are due for (some may have been done at this visit.)  Health Maintenance Due   Topic Date Due     INFLUENZA VACCINE (SYSTEM  ASSIGNED)  09/01/2017         Suggested websites for health information:  Www.[employer].org : Up to date and easily searchable information on multiple topics.  Www.medlineplus.gov : medication info, interactive tutorials, watch real surgeries online  Www.familydoctor.org : good info from the Academy of Family Physicians  Www.cdc.gov : public health info, travel advisories, epidemics (H1N1)  Www.aap.org : children's health info, normal development, vaccinations  Www.health.[facility].[place].us : [place] dept of health, public health issues in [place], N1N1    Your care team:     Family Medicine   AKASH Browne MD Emily Bunt, APRN CNP   S. MD Steffanie Garvin MD Angela Wermerskirchen, MD         Clinic hours: Monday - Wednesday 7 am-7 pm   Thursdays and Fridays 7 am-5 pm.     [facility]: Monday - Friday 11 am-9 pm,   Saturday and Sunday 9 am-5 pm.    [facility]: Monday -Thursday 8 am-8 pm; Friday 8 am-6 pm; Saturday and Sunday 9 am-5 pm.     [facility]: Monday Thursday 8 am   7 pm; Friday 8 am   6 pm    Clinic: [phone]   [facility]: [phone]   [facility]: [phone]

## 2018-02-02 PROBLEM — K21.9 GASTROESOPHAGEAL REFLUX DISEASE WITHOUT ESOPHAGITIS: Status: ACTIVE | Noted: 2018-02-02

## 2018-06-20 ENCOUNTER — TELEPHONE (OUTPATIENT)
Dept: FAMILY MEDICINE | Facility: CLINIC | Age: 43
End: 2018-06-20

## 2018-06-20 DIAGNOSIS — E11.65 TYPE 2 DIABETES MELLITUS WITH HYPERGLYCEMIA, WITHOUT LONG-TERM CURRENT USE OF INSULIN (H): ICD-10-CM

## 2018-06-20 DIAGNOSIS — R74.8 ELEVATED LIVER ENZYMES: ICD-10-CM

## 2018-06-20 DIAGNOSIS — E11.65 TYPE 2 DIABETES MELLITUS WITH HYPERGLYCEMIA, WITHOUT LONG-TERM CURRENT USE OF INSULIN (H): Primary | ICD-10-CM

## 2018-06-20 DIAGNOSIS — Z00.00 ROUTINE GENERAL MEDICAL EXAMINATION AT A HEALTH CARE FACILITY: ICD-10-CM

## 2018-06-20 NOTE — TELEPHONE ENCOUNTER
"Requested Prescriptions   Pending Prescriptions Disp Refills     metFORMIN (GLUCOPHAGE) 1000 MG tablet 180 tablet 1     Sig: TAKE 1 TABLET TWO TIMES A DAY WITH MEALS    Biguanide Agents Passed    6/20/2018  1:58 PM       Passed - Blood pressure less than 140/90 in past 6 months    BP Readings from Last 3 Encounters:   02/01/18 126/80   07/06/17 120/86   10/31/16 118/86                Passed - Patient has documented LDL within the past 12 mos.    Recent Labs   Lab Test  01/29/18   0756   LDL  88            Passed - Patient has had a Microalbumin in the past 12 mos.    Recent Labs   Lab Test  01/29/18   0756   MICROL  11   UMALCR  9.65            Passed - Patient is age 10 or older       Passed - Patient has documented A1c within the specified period of time.    If HgbA1C is 8 or greater, it needs to be on file within the past 3 months.  If less than 8, must be on file within the past 6 months.     Recent Labs   Lab Test  01/29/18   0756   A1C  7.3*            Passed - Patient's CR is NOT>1.4 OR Patient's EGFR is NOT<45 within past 12 mos.    Recent Labs   Lab Test  01/29/18   0756   GFRESTIMATED  88   GFRESTBLACK  >90       Recent Labs   Lab Test  01/29/18   0756   CR  0.93            Passed - Patient does NOT have a diagnosis of CHF.       Passed - Recent (6 mo) or future (30 days) visit within the authorizing provider's specialty    Patient had office visit in the last 6 months or has a visit in the next 30 days with authorizing provider or within the authorizing provider's specialty.  See \"Patient Info\" tab in inbasket, or \"Choose Columns\" in Meds & Orders section of the refill encounter.            metFORMIN (GLUCOPHAGE) 1000 MG tablet  Last Written Prescription Date:  2/1/18  Last Fill Quantity: 180,  # refills: 1   Last office visit: 2/1/2018 with prescribing provider:  Dr. Villa   Future Office Visit:   Next 5 appointments (look out 90 days)     Jul 10, 2018  8:45 AM CDT   Return Visit with Klarissa Cancino MD "   [facility] ([facility])    [address] [address] [place] [address] [phone]

## 2018-06-20 NOTE — TELEPHONE ENCOUNTER
"Requested Prescriptions   Pending Prescriptions Disp Refills     glipiZIDE (GLIPIZIDE XL) 10 MG 24 hr tablet 180 tablet 1     Sig: TAKE 2 BY MOUTH DAILY    Sulfonylurea Agents Passed    6/20/2018  1:56 PM       Passed - Blood pressure less than 140/90 in past 6 months    BP Readings from Last 3 Encounters:   02/01/18 126/80   07/06/17 120/86   10/31/16 118/86                Passed - Patient has documented LDL within the past 12 mos.    Recent Labs   Lab Test  01/29/18   0756   LDL  88            Passed - Patient has had a Microalbumin in the past 12 mos.    Recent Labs   Lab Test  01/29/18   0756   MICROL  11   UMALCR  9.65            Passed - Patient has documented A1c within the specified period of time.    If HgbA1C is 8 or greater, it needs to be on file within the past 3 months.  If less than 8, must be on file within the past 6 months.     Recent Labs   Lab Test  01/29/18   0756   A1C  7.3*            Passed - Patient is age 18 or older       Passed - Patient has a recent creatinine (normal) within the past 12 mos.    Recent Labs   Lab Test  01/29/18   0756   CR  0.93            Passed - Recent (6 mo) or future (30 days) visit within the authorizing provider's specialty    Patient had office visit in the last 6 months or has a visit in the next 30 days with authorizing provider or within the authorizing provider's specialty.  See \"Patient Info\" tab in inbasket, or \"Choose Columns\" in Meds & Orders section of the refill encounter.            glipiZIDE (GLIPIZIDE XL) 10 MG 24 hr tablet  Last Written Prescription Date:  2/1/18  Last Fill Quantity: 180,  # refills: 1   Last office visit: 2/1/2018 with prescribing provider:  Dr. Villa   Future Office Visit:   Next 5 appointments (look out 90 days)     Jul 10, 2018  8:45 AM CDT   [facility]   [facility]    [address] [place] [address] [phone]                   "

## 2018-06-20 NOTE — TELEPHONE ENCOUNTER
"Requested Prescriptions   Pending Prescriptions Disp Refills     dapagliflozin (FARXIGA) 10 MG TABS tablet 90 tablet 1     Sig: Take 1 tablet (10 mg) by mouth daily    Sodium Glucose Co-Transport Inhibitor Agents Passed    6/20/2018  2:00 PM       Passed - Blood pressure less than 140/90 in past 6 months    BP Readings from Last 3 Encounters:   02/01/18 126/80   07/06/17 120/86   10/31/16 118/86                Passed - Patient has documented LDL within the past 12 mos.    Recent Labs   Lab Test  01/29/18   0756   LDL  88            Passed - Patient has had a Microalbumin in the past 12 mos.    Recent Labs   Lab Test  01/29/18   0756   MICROL  11   UMALCR  9.65            Passed - Patient has documented A1c within the specified period of time.    If HgbA1C is 8 or greater, it needs to be on file within the past 3 months.  If less than 8, must be on file within the past 6 months.     Recent Labs   Lab Test  01/29/18   0756   A1C  7.3*            Passed - No creatinine >1.4 or GFR <45 within the past 12 mos    Recent Labs   Lab Test  01/29/18   0756   GFRESTIMATED  88   GFRESTBLACK  >90       Recent Labs   Lab Test  01/29/18   0756   CR  0.93            Passed - Patient is age 18 or older       Passed - Patient has documented normal Potassium within the last 12 mos.    Recent Labs   Lab Test  01/29/18   0756   POTASSIUM  4.5            Passed - Recent (6 mo) or future (30 days) visit within the authorizing provider's specialty    Patient had office visit in the last 6 months or has a visit in the next 30 days with authorizing provider or within the authorizing provider's specialty.  See \"Patient Info\" tab in inbasket, or \"Choose Columns\" in Meds & Orders section of the refill encounter.            dapagliflozin (FARXIGA) 10 MG TABS tablet  Last Written Prescription Date:  2/1/18  Last Fill Quantity: 90,  # refills: 1   Last office visit: 2/1/2018 with prescribing provider:  Dr. Villa   Future Office Visit:   Next 5 " appointments (look out 90 days)     Jul 10, 2018  8:45 AM CDT   [facility]    [address] [address] [place] [address] [phone]

## 2018-06-22 RX ORDER — DAPAGLIFLOZIN 10 MG/1
10 TABLET, FILM COATED ORAL DAILY
Qty: 30 TABLET | Refills: 0 | Status: SHIPPED | OUTPATIENT
Start: 2018-06-22 | End: 2018-08-13

## 2018-06-22 RX ORDER — GLIPIZIDE 10 MG/1
TABLET, FILM COATED, EXTENDED RELEASE ORAL
Qty: 60 TABLET | Refills: 0 | Status: SHIPPED | OUTPATIENT
Start: 2018-06-22 | End: 2018-07-10

## 2018-06-22 NOTE — TELEPHONE ENCOUNTER
Routing refill request to provider for review/approval because:  RN would only be able to give 30 and can no as this is a mail order pharmacy.    Kiara Reynaga RN, [facility]

## 2018-06-22 NOTE — TELEPHONE ENCOUNTER
Requested Prescriptions   Pending Prescriptions Disp Refills     metFORMIN (GLUCOPHAGE) 1000 MG tablet [Pharmacy Med Name: METFORMIN TAB 1000MG]  Last Written Prescription Date:  2/1/18  Last Fill Quantity: 180 tablet,  # refills: 1   Last office visit: 2/1/2018 with prescribing provider:  Dr. Villa   Future Office Visit:   Next 5 appointments (look out 90 days)     Jul 10, 2018  8:45 AM CDT   Return Visit with Klarissa Cancino MD   [facility]    [address] [place] [address] [phone]               180 tablet 1     Sig: TAKE 1 TABLET TWICE A DAY  WITH MEALS    Biguanide Agents Passed    6/20/2018 10:24 PM       Passed - Blood pressure less than 140/90 in past 6 months    BP Readings from Last 3 Encounters:   02/01/18 126/80   07/06/17 120/86   10/31/16 118/86                Passed - Patient has documented LDL within the past 12 mos.    Recent Labs   Lab Test  01/29/18   0756   LDL  88            Passed - Patient has had a Microalbumin in the past 12 mos.    Recent Labs   Lab Test  01/29/18   0756   MICROL  11   UMALCR  9.65            Passed - Patient is age 10 or older       Passed - Patient has documented A1c within the specified period of time.    If HgbA1C is 8 or greater, it needs to be on file within the past 3 months.  If less than 8, must be on file within the past 6 months.     Recent Labs   Lab Test  01/29/18   0756   A1C  7.3*            Passed - Patient's CR is NOT>1.4 OR Patient's EGFR is NOT<45 within past 12 mos.    Recent Labs   Lab Test  01/29/18   0756   GFRESTIMATED  88   GFRESTBLACK  >90       Recent Labs   Lab Test  01/29/18   0756   CR  0.93            Passed - Patient does NOT have a diagnosis of CHF.       Passed - Recent (6 mo) or future (30 days) visit within the authorizing provider's specialty    Patient had office visit in the last 6 months or has a visit in the next 30 days with authorizing provider or within the  "authorizing provider's specialty.  See \"Patient Info\" tab in inbasket, or \"Choose Columns\" in Meds & Orders section of the refill encounter.                glipiZIDE (GLUCOTROL XL) 10 MG 24 hr tablet [Pharmacy Med Name: GLIPIZIDE XL TAB 10MG]  Last Written Prescription Date:  2/01/18  Last Fill Quantity: 180 tablet,  # refills: 1   Last office visit: 2/1/2018 with prescribing provider:  Dr. Villa   Future Office Visit:   Next 5 appointments (look out 90 days)     Jul 10, 2018  8:45 AM CDT   Return Visit with Klarissa Cancino MD   [facility]    [address] [place] [address] [phone]                  180 tablet 1     Sig: TAKE 2 TABLETS DAILY    Sulfonylurea Agents Passed    6/20/2018 10:24 PM       Passed - Blood pressure less than 140/90 in past 6 months    BP Readings from Last 3 Encounters:   02/01/18 126/80   07/06/17 120/86   10/31/16 118/86                Passed - Patient has documented LDL within the past 12 mos.    Recent Labs   Lab Test  01/29/18   0756   LDL  88            Passed - Patient has had a Microalbumin in the past 12 mos.    Recent Labs   Lab Test  01/29/18   0756   MICROL  11   UMALCR  9.65            Passed - Patient has documented A1c within the specified period of time.    If HgbA1C is 8 or greater, it needs to be on file within the past 3 months.  If less than 8, must be on file within the past 6 months.     Recent Labs   Lab Test  01/29/18   0756   A1C  7.3*            Passed - Patient is age 18 or older       Passed - Patient has a recent creatinine (normal) within the past 12 mos.    Recent Labs   Lab Test  01/29/18   0756   CR  0.93            Passed - Recent (6 mo) or future (30 days) visit within the authorizing provider's specialty    Patient had office visit in the last 6 months or has a visit in the next 30 days with authorizing provider or within the authorizing provider's specialty.  See \"Patient Info\" tab in " "inbasket, or \"Choose Columns\" in Meds & Orders section of the refill encounter.              "

## 2018-06-25 RX ORDER — GLIPIZIDE 10 MG/1
TABLET, FILM COATED, EXTENDED RELEASE ORAL
Qty: 180 TABLET | Refills: 0 | Status: SHIPPED | OUTPATIENT
Start: 2018-06-25 | End: 2018-08-13

## 2018-06-26 NOTE — TELEPHONE ENCOUNTER
I don't think it's like how physicals are as far as coverage. It depends on there plan correct?    Maria Fernanda Burton

## 2018-06-26 NOTE — TELEPHONE ENCOUNTER
.Reason for Call:  Other prescription    Detailed comments: Patient's wife wondering if ok for the patient to come in early to do a lab test for his med refill for August and the wife is also wondering if the insurance will pay for it if the patient come in early.     Phone Number Patient can be reached at: Home number on file [phone] (home)    Best Time: any    Can we leave a detailed message on this number? YES    Call taken on 6/26/2018 at 4:17 PM by Travon Avitia

## 2018-06-28 NOTE — TELEPHONE ENCOUNTER
Please call patient or wife - question about lab testing - okay to do these prior to the August appointment.  I will place orders.  Should not be a problem with insurance since we are following up from the diabetes and that testing can be done every 3 months.  Not due for fasting labs until Jan 2019.      BHARTI

## 2018-06-28 NOTE — TELEPHONE ENCOUNTER
This writer attempted to contact 1 on 06/28/18      Reason for call Orders and left detailed message.      If patient calls back:   Patient contacted by [facility] Team (MA/TC). Inform patient that someone from the team will contact them, document that pt called and route to care team.     Ambrocio Stevens, Medical Assistant

## 2018-07-06 DIAGNOSIS — E11.65 TYPE 2 DIABETES MELLITUS WITH HYPERGLYCEMIA, WITHOUT LONG-TERM CURRENT USE OF INSULIN (H): ICD-10-CM

## 2018-07-06 DIAGNOSIS — Z00.00 ROUTINE GENERAL MEDICAL EXAMINATION AT A HEALTH CARE FACILITY: ICD-10-CM

## 2018-07-06 LAB
ALBUMIN SERPL-MCNC: 4.6 G/DL (ref 3.4–5)
ALP SERPL-CCNC: 96 U/L (ref 40–150)
ALT SERPL W P-5'-P-CCNC: 90 U/L (ref 0–70)
ANION GAP SERPL CALCULATED.3IONS-SCNC: 11 MMOL/L (ref 3–14)
AST SERPL W P-5'-P-CCNC: 34 U/L (ref 0–45)
BILIRUB SERPL-MCNC: 0.9 MG/DL (ref 0.2–1.3)
BUN SERPL-MCNC: 16 MG/DL (ref 7–30)
CALCIUM SERPL-MCNC: 8.9 MG/DL (ref 8.5–10.1)
CHLORIDE SERPL-SCNC: 104 MMOL/L (ref 94–109)
CO2 SERPL-SCNC: 25 MMOL/L (ref 20–32)
CREAT SERPL-MCNC: 0.79 MG/DL (ref 0.66–1.25)
ERYTHROCYTE [DISTWIDTH] IN BLOOD BY AUTOMATED COUNT: 12.6 % (ref 10–15)
GFR SERPL CREATININE-BSD FRML MDRD: >90 ML/MIN/1.7M2
GLUCOSE SERPL-MCNC: 212 MG/DL (ref 70–99)
HBA1C MFR BLD: 8.2 % (ref 0–5.6)
HCT VFR BLD AUTO: 47.7 % (ref 40–53)
HGB BLD-MCNC: 16.5 G/DL (ref 13.3–17.7)
MCH RBC QN AUTO: 32.3 PG (ref 26.5–33)
MCHC RBC AUTO-ENTMCNC: 34.6 G/DL (ref 31.5–36.5)
MCV RBC AUTO: 93 FL (ref 78–100)
PLATELET # BLD AUTO: 192 10E9/L (ref 150–450)
POTASSIUM SERPL-SCNC: 4 MMOL/L (ref 3.4–5.3)
PROT SERPL-MCNC: 7.8 G/DL (ref 6.8–8.8)
RBC # BLD AUTO: 5.11 10E12/L (ref 4.4–5.9)
SODIUM SERPL-SCNC: 140 MMOL/L (ref 133–144)
WBC # BLD AUTO: 8.1 10E9/L (ref 4–11)

## 2018-07-06 PROCEDURE — 83036 HEMOGLOBIN GLYCOSYLATED A1C: CPT | Performed by: FAMILY MEDICINE

## 2018-07-06 PROCEDURE — 80053 COMPREHEN METABOLIC PANEL: CPT | Performed by: FAMILY MEDICINE

## 2018-07-06 PROCEDURE — 85027 COMPLETE CBC AUTOMATED: CPT | Performed by: FAMILY MEDICINE

## 2018-07-06 PROCEDURE — 36415 COLL VENOUS BLD VENIPUNCTURE: CPT | Performed by: FAMILY MEDICINE

## 2018-07-08 ENCOUNTER — MYC MEDICAL ADVICE (OUTPATIENT)
Dept: FAMILY MEDICINE | Facility: CLINIC | Age: 43
End: 2018-07-08

## 2018-07-10 ENCOUNTER — TELEPHONE (OUTPATIENT)
Dept: FAMILY MEDICINE | Facility: CLINIC | Age: 43
End: 2018-07-10

## 2018-07-10 ENCOUNTER — OFFICE VISIT (OUTPATIENT)
Dept: DERMATOLOGY | Facility: CLINIC | Age: 43
End: 2018-07-10
Payer: COMMERCIAL

## 2018-07-10 DIAGNOSIS — L21.9 DERMATITIS, SEBORRHEIC: ICD-10-CM

## 2018-07-10 DIAGNOSIS — E11.65 TYPE 2 DIABETES MELLITUS WITH HYPERGLYCEMIA, WITHOUT LONG-TERM CURRENT USE OF INSULIN (H): ICD-10-CM

## 2018-07-10 PROCEDURE — 99213 OFFICE O/P EST LOW 20 MIN: CPT | Performed by: DERMATOLOGY

## 2018-07-10 RX ORDER — DAPAGLIFLOZIN 10 MG/1
10 TABLET, FILM COATED ORAL DAILY
Qty: 90 TABLET | Refills: 0 | Status: CANCELLED | OUTPATIENT
Start: 2018-07-10

## 2018-07-10 RX ORDER — KETOCONAZOLE 20 MG/ML
SHAMPOO TOPICAL
Qty: 120 ML | Refills: 11 | Status: SHIPPED | OUTPATIENT
Start: 2018-07-10 | End: 2019-11-12

## 2018-07-10 ASSESSMENT — PAIN SCALES - GENERAL: PAINLEVEL: NO PAIN (0)

## 2018-07-10 NOTE — LETTER
7/10/2018         RE: Jose Crews  [address]  [facility] [address]        Dear Colleague,    Thank you for referring your patient, Jose Crews, to the [facility]. Please see a copy of my visit note below.    [facility] Dermatology Note      Dermatology Problem List:  1. 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 Jesus's note on 3/8/2011  2. Seborrheic dermatitis  -Previous Tx: fluocinolone (initiated 4/27/2015), ketoconazole shampoo (initiated 4/27/2015), desonide 0.05% cream    Encounter Date: Jul 10, 2018    CC:  Chief Complaint   Patient presents with     Skin Check     No new spots of concern.       History of Present Illness:  Mr. Jose Crews is a 42 year old male with history of dysplastic nevus presents as a follow-up for rash in axilla. The patient was last seen 7/6/2017. The patient reports that the axillar rash is resolved. The patient is doing well with a new deoderant. The patient reports that his seborrheic dermatitis is managed well on current topical regimen. The patient reports no spots that are bleeding crusting or changing.     The patient reports no other lesions of concern.    Past Medical History:   Patient Active Problem List   Diagnosis     Hyperlipidemia LDL goal <100     Obesity     Seborrhea     Elevated liver enzymes     History of dysplastic nevus     Type 2 diabetes mellitus with hyperglycemia (H)     Balanoposthitis     Phimosis     Type 2 diabetes mellitus with hyperglycemia, without long-term current use of insulin (H)     Gastroesophageal reflux disease without esophagitis     Past Medical History:   Diagnosis Date     Acne vulgaris      Balanitis 2/23/2009     History of atypical/dysplastic nevus 03/2010    back with positive margins.     MEDICAL HISTORY OF - age 12    hospitalized for a week with unexplained abdominal pain and vomiting     Type II or unspecified type diabetes mellitus  without mention of complication, not stated as uncontrolled      Past Surgical History:   Procedure Laterality Date     HC TOOTH EXTRACTION W/FORCEP  2006     Social History:  The patient will be starting a new job as a .  He has 2 cats, one is older and sick. .   Kept in chart for convenience.       Family History:  There is a family history of melanoma in the patient's aunt.   There is no family history of asthma, psoriasis, eczema or hay fever.   Kept in chart for convenience.       Medications:  Current Outpatient Prescriptions   Medication Sig Dispense Refill     ACE/ARB NOT PRESCRIBED, INTENTIONAL, by Other route continuous prn.       ARB NOT PRESCRIBED, INTENTIONAL, by Other route continuous prn.  0     aspirin 81 MG tablet Take 1 tablet by mouth daily. *       blood glucose calibration (KELLY CONTOUR) NORMAL solution Use to calibrate blood glucose monitor as directed. 2 Bottle 11     blood glucose monitoring (KELLY CONTOUR) test strip TEST BLOOD SUGAR 1 TIMES A DAY OR AS DIRECTED 100 each 3     blood glucose monitoring (ONE TOUCH ULTRASOFT) lancets 1 each daily 100 each 3     dapagliflozin (FARXIGA) 10 MG TABS tablet Take 1 tablet (10 mg) by mouth daily 30 tablet 0     desonide (DESOWEN) 0.05 % cream Apply twice daily for up to 1 week for flares. Then use twice weekly as needed 60 g 0     glipiZIDE (GLUCOTROL XL) 10 MG 24 hr tablet TAKE 2 TABLETS DAILY 180 tablet 0     ketoconazole (NIZORAL) 2 % shampoo Apply to the affected area and wash off after 5 minutes, use three times weekly 120 mL 11     metFORMIN (GLUCOPHAGE) 1000 MG tablet TAKE 1 TABLET TWICE A DAY  WITH MEALS 180 tablet 0     Multiple Vitamin (DAILY MULTIVITAMIN PO) Take 1 tablet by mouth daily.       simvastatin (ZOCOR) 40 MG tablet Take 1 tablet (40 mg) by mouth At Bedtime 90 tablet 3     glucose blood VI test strips strip by In Vitro route 1- 2 times daily. Use daily to test blood sugar.with kelly contour. (Patient not  taking: Reported on 7/10/2018) 3 Month 3     [DISCONTINUED] glipiZIDE (GLIPIZIDE XL) 10 MG 24 hr tablet TAKE 2 BY MOUTH DAILY (Patient not taking: Reported on 7/10/2018) 60 tablet 0     [DISCONTINUED] metFORMIN (GLUCOPHAGE) 1000 MG tablet TAKE 1 TABLET TWO TIMES A DAY WITH MEALS (Patient not taking: Reported on 7/10/2018) 60 tablet 1     No Known Allergies    Review of Systems:  -Skin: As above in HPI. No additional skin concerns.   -Const: The patient is generally feeling well today. No recent illnesses or hospitalizations.     Physical exam:  GENERAL: This is a well developed, well-nourished male in no acute distress, in a pleasant mood.    SKIN: Total skin excluding the undergarment areas was performed. The exam included the head/face, neck, both arms, chest, back, abdomen, both legs, digits and/or nails.   - There is no erythema, telangectasias, nodularity, or pigmentation on the site of prior skin cancer.  -scalp is healthy  - There are well circumscribed, symmetric tan to brown pigmented macules and papules on the trunk and extremities.     -No other lesions of concern on areas examined.     Impression/Plan:  1. History of dysplastic nevus: not addressed at this visit.     Last total skin exam 3/21/2017  2. Seborrheic dermatitis, face and scalp, mild. Well controlled with current topicals-no scaly today    For scalp, continue ketoconazole 2% shampoo. Apply every other day to the scalp.     For face, continue desonide 0.05% cream. Apply daily to the scalp for up to two weeks for flares.   3. Rash-Pink scaly patches, axilla.  - resolved     Follow up in 1 year, earlier for new or changing lesions.     Staff Involved:  Scribe/Staff    Scribe Disclosure:   I, Adrianne Potts, am serving as a scribe to document services personally performed by Dr. Klarissa Cancino, based on data collection and the provider's statements to me.     Provider Disclosure:   The documentation recorded by the scribe accurately reflects the  services I personally performed and the decisions made by me.    Klarissa Cancino MD    Department of Dermatology  [facility]: Phone: [phone], Fax:[phone]  [facility]: Phone: [phone], Fax: [phone]        Again, thank you for allowing me to participate in the care of your patient.        Sincerely,        Klarissa Cancino MD

## 2018-07-10 NOTE — NURSING NOTE
Jose Crews's goals for this visit include:   Chief Complaint   Patient presents with     Skin Check     No new spots of concern.       He requests these members of his care team be copied on today's visit information: PCP    PCP: Steffanie Villa    Referring Provider:  No referring provider defined for this encounter.    There were no vitals taken for this visit.    Do you need any medication refills at today's visit? None      Tania Gonzalez, [facility]

## 2018-07-10 NOTE — MR AVS SNAPSHOT
After Visit Summary   7/10/2018    Jose Crews    MRN: [id number]           Patient Information     Date Of Birth          1975        Visit Information        Provider Department      7/10/2018 8:45 AM Klarissa Cancino MD [facility]        Today's Diagnoses     Dermatitis, seborrheic          Care Instructions                      Follow-ups after your visit        Follow-up notes from your care team     Return in about 1 year (around 7/10/2019) for hx of dysplastic nevus.      Your next 10 appointments already scheduled     Aug 13, 2018  7:30 AM CDT   New Visit with Jeff Adkins MD, [facility] NURSE ONLY   [facility]    [address] [address] [place] [address] [phone]              Who to contact     If you have questions or need follow up information about today's clinic visit or your schedule please contact [facility] directly at [phone].  Normal or non-critical lab and imaging results will be communicated to you by Notizzat, letter or phone within 4 business days after the clinic has received the results. If you do not hear from us within 7 days, please contact the clinic through Visual Threat or phone. If you have a critical or abnormal lab result, we will notify you by phone as soon as possible.  Submit refill requests through Visual Threat or call your pharmacy and they will forward the refill request to us. Please allow 3 business days for your refill to be completed.          Additional Information About Your Visit        Planwisehart Information     Visual Threat gives you secure access to your electronic health record. If you see a primary care provider, you can also send messages to your care team and make appointments. If you have questions, please call your primary care clinic.  If you do not have a primary care provider, please call [phone] and they will assist you.      Visual Threat is an  electronic gateway that provides easy, online access to your medical records. With Storyvine, you can request a clinic appointment, read your test results, renew a prescription or communicate with your care team.     To access your existing account, please contact your [facility] or call [phone] for assistance.        Care EveryWhere ID     This is your Care EveryWhere ID. This could be used by other organizations to access your [place] medical records  [id number]         Blood Pressure from Last 3 Encounters:   02/01/18 126/80   07/06/17 120/86   10/31/16 118/86    Weight from Last 3 Encounters:   02/01/18 100.5 kg (221 lb 8 oz)   07/06/17 99.3 kg (219 lb)   10/31/16 99.3 kg (219 lb)              Today, you had the following     No orders found for display         Today's Medication Changes          These changes are accurate as of 7/10/18  9:10 AM.  If you have any questions, ask your nurse or doctor.               Stop taking these medicines if you haven't already. Please contact your care team if you have questions.     desonide 0.05 % cream   Commonly known as:  DESOWEN   Stopped by:  Klarissa Cancino MD                Where to get your medicines      [facility] [place] [place] [facility] [address] AT [address] [facility]  [address], [facility] [address]     Phone:  [phone]     ketoconazole 2 % shampoo                Primary Care Provider Office Phone # Fax #    Steffanie Villa [phone] [address] [facility] [address]        Equal Access to Services     [facility] : Hadii monica Wick, michael miller, qaybarlene bobby. So [facility] [place] [facility] [phone].    ATENCIÓN: Si habla español, tiene a graham disposición servicios gratuitos de asistencia lingüística. Llame al [phone].    We comply with applicable federal  civil rights laws and [place] laws. We do not discriminate on the basis of race, color, national origin, age, disability, sex, sexual orientation, or gender identity.            Thank you!     Thank you for choosing [facility]  for your care. Our goal is always to provide you with excellent care. Hearing back from our patients is one way we can continue to improve our services. Please take a few minutes to complete the written survey that you may receive in the mail after your visit with us. Thank you!             Your Updated Medication List - Protect others around you: Learn how to safely use, store and throw away your medicines at www.disposemymeds.org.          This list is accurate as of 7/10/18  9:10 AM.  Always use your most recent med list.                   Brand Name Dispense Instructions for use Diagnosis    * ACE/ARB/ARNI NOT PRESCRIBED (INTENTIONAL)      by Other route continuous prn.    Type 2 diabetes, HbA1c goal < 7% (H)       * ARB NOT PRESCRIBED (INTENTIONAL)      by Other route continuous prn.    Type 2 diabetes, HbA1c goal < 7% (H)       aspirin 81 MG tablet      Take 1 tablet by mouth daily. *        blood glucose calibration Normal solution     2 Bottle    Use to calibrate blood glucose monitor as directed.    Type 2 diabetes mellitus with hyperglycemia, without long-term current use of insulin (H)       blood glucose monitoring lancets     100 each    1 each daily    Type 2 diabetes mellitus with hyperglycemia, without long-term current use of insulin (H)       * blood glucose monitoring test strip    no brand specified    3 Month    by In Vitro route 1- 2 times daily. Use daily to test blood sugar.with kelly contour.    Type 2 diabetes, HbA1c goal < 7% (H)       * blood glucose monitoring test strip    KELLY CONTOUR    100 each    TEST BLOOD SUGAR 1 TIMES A DAY OR AS DIRECTED    Type 2 diabetes mellitus with hyperglycemia, without long-term current use of insulin (H)        DAILY MULTIVITAMIN PO      Take 1 tablet by mouth daily.        dapagliflozin 10 MG Tabs tablet    FARXIGA    30 tablet    Take 1 tablet (10 mg) by mouth daily    Type 2 diabetes mellitus with hyperglycemia, without long-term current use of insulin (H)       glipiZIDE 10 MG 24 hr tablet    GLUCOTROL XL    180 tablet    TAKE 2 TABLETS DAILY    Type 2 diabetes mellitus with hyperglycemia, without long-term current use of insulin (H)       ketoconazole 2 % shampoo    NIZORAL    120 mL    Apply to the affected area and wash off after 5 minutes, use three times weekly    Dermatitis, seborrheic       metFORMIN 1000 MG tablet    GLUCOPHAGE    180 tablet    TAKE 1 TABLET TWICE A DAY  WITH MEALS    Type 2 diabetes mellitus with hyperglycemia, without long-term current use of insulin (H)       simvastatin 40 MG tablet    ZOCOR    90 tablet    Take 1 tablet (40 mg) by mouth At Bedtime    Hyperlipidemia LDL goal <100       * Notice:  This list has 4 medication(s) that are the same as other medications prescribed for you. Read the directions carefully, and ask your doctor or other care provider to review them with you.

## 2018-07-10 NOTE — PROGRESS NOTES
[facility] Dermatology Note      Dermatology Problem List:  1. 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 Jesus's note on 3/8/2011  2. Seborrheic dermatitis  -Previous Tx: fluocinolone (initiated 4/27/2015), ketoconazole shampoo (initiated 4/27/2015), desonide 0.05% cream    Encounter Date: Jul 10, 2018    CC:  Chief Complaint   Patient presents with     Skin Check     No new spots of concern.       History of Present Illness:  Mr. Jose Crews is a 42 year old male with history of dysplastic nevus presents as a follow-up for rash in axilla. The patient was last seen 7/6/2017. The patient reports that the axillar rash is resolved. The patient is doing well with a new deoderant. The patient reports that his seborrheic dermatitis is managed well on current topical regimen. The patient reports no spots that are bleeding crusting or changing.     The patient reports no other lesions of concern.    Past Medical History:   Patient Active Problem List   Diagnosis     Hyperlipidemia LDL goal <100     Obesity     Seborrhea     Elevated liver enzymes     History of dysplastic nevus     Type 2 diabetes mellitus with hyperglycemia (H)     Balanoposthitis     Phimosis     Type 2 diabetes mellitus with hyperglycemia, without long-term current use of insulin (H)     Gastroesophageal reflux disease without esophagitis     Past Medical History:   Diagnosis Date     Acne vulgaris      Balanitis 2/23/2009     History of atypical/dysplastic nevus 03/2010    back with positive margins.     MEDICAL HISTORY OF - age 12    hospitalized for a week with unexplained abdominal pain and vomiting     Type II or unspecified type diabetes mellitus without mention of complication, not stated as uncontrolled      Past Surgical History:   Procedure Laterality Date     HC TOOTH EXTRACTION W/FORCEP  2006     Social History:  The patient will be starting a new job as a .  He has 2  cats, one is older and sick. .   Kept in chart for convenience.       Family History:  There is a family history of melanoma in the patient's aunt.   There is no family history of asthma, psoriasis, eczema or hay fever.   Kept in chart for convenience.       Medications:  Current Outpatient Prescriptions   Medication Sig Dispense Refill     ACE/ARB NOT PRESCRIBED, INTENTIONAL, by Other route continuous prn.       ARB NOT PRESCRIBED, INTENTIONAL, by Other route continuous prn.  0     aspirin 81 MG tablet Take 1 tablet by mouth daily. *       blood glucose calibration (KELLY CONTOUR) NORMAL solution Use to calibrate blood glucose monitor as directed. 2 Bottle 11     blood glucose monitoring (KELLY CONTOUR) test strip TEST BLOOD SUGAR 1 TIMES A DAY OR AS DIRECTED 100 each 3     blood glucose monitoring (ONE TOUCH ULTRASOFT) lancets 1 each daily 100 each 3     dapagliflozin (FARXIGA) 10 MG TABS tablet Take 1 tablet (10 mg) by mouth daily 30 tablet 0     desonide (DESOWEN) 0.05 % cream Apply twice daily for up to 1 week for flares. Then use twice weekly as needed 60 g 0     glipiZIDE (GLUCOTROL XL) 10 MG 24 hr tablet TAKE 2 TABLETS DAILY 180 tablet 0     ketoconazole (NIZORAL) 2 % shampoo Apply to the affected area and wash off after 5 minutes, use three times weekly 120 mL 11     metFORMIN (GLUCOPHAGE) 1000 MG tablet TAKE 1 TABLET TWICE A DAY  WITH MEALS 180 tablet 0     Multiple Vitamin (DAILY MULTIVITAMIN PO) Take 1 tablet by mouth daily.       simvastatin (ZOCOR) 40 MG tablet Take 1 tablet (40 mg) by mouth At Bedtime 90 tablet 3     glucose blood VI test strips strip by In Vitro route 1- 2 times daily. Use daily to test blood sugar.with kelly contour. (Patient not taking: Reported on 7/10/2018) 3 Month 3     [DISCONTINUED] glipiZIDE (GLIPIZIDE XL) 10 MG 24 hr tablet TAKE 2 BY MOUTH DAILY (Patient not taking: Reported on 7/10/2018) 60 tablet 0     [DISCONTINUED] metFORMIN (GLUCOPHAGE) 1000 MG tablet TAKE 1  TABLET TWO TIMES A DAY WITH MEALS (Patient not taking: Reported on 7/10/2018) 60 tablet 1     No Known Allergies    Review of Systems:  -Skin: As above in HPI. No additional skin concerns.   -Const: The patient is generally feeling well today. No recent illnesses or hospitalizations.     Physical exam:  GENERAL: This is a well developed, well-nourished male in no acute distress, in a pleasant mood.    SKIN: Total skin excluding the undergarment areas was performed. The exam included the head/face, neck, both arms, chest, back, abdomen, both legs, digits and/or nails.   - There is no erythema, telangectasias, nodularity, or pigmentation on the site of prior skin cancer.  -scalp is healthy  - There are well circumscribed, symmetric tan to brown pigmented macules and papules on the trunk and extremities.     -No other lesions of concern on areas examined.     Impression/Plan:  1. History of dysplastic nevus: not addressed at this visit.     Last total skin exam 3/21/2017  2. Seborrheic dermatitis, face and scalp, mild. Well controlled with current topicals-no scaly today    For scalp, continue ketoconazole 2% shampoo. Apply every other day to the scalp.     For face, continue desonide 0.05% cream. Apply daily to the scalp for up to two weeks for flares.   3. Rash-Pink scaly patches, axilla.  - resolved     Follow up in 1 year, earlier for new or changing lesions.     Staff Involved:  Scribe/Staff    Scribe Disclosure:   I, Adrianne Potts, am serving as a scribe to document services personally performed by Dr. Klarissa Cancino, based on data collection and the provider's statements to me.     Provider Disclosure:   The documentation recorded by the scribe accurately reflects the services I personally performed and the decisions made by me.    Klarissa Cancino MD    Department of Dermatology  [facility]: Phone: [phone], Fax:[phone]  [place]  [facility]: Phone: [phone], Fax: [phone]

## 2018-07-11 NOTE — TELEPHONE ENCOUNTER
Please call patient re:  Message not read - appointment needed for diabetes follow up - higher than previous A1C and above goal.      Lab Results   Component Value Date    A1C 8.2 07/06/2018    A1C 7.3 01/29/2018    A1C 7.4 07/06/2017    A1C 7.0 11/02/2016    A1C 8.0 02/17/2016

## 2018-07-11 NOTE — TELEPHONE ENCOUNTER
This writer attempted to contact Jose on 07/11/18      Reason for call Patient needs diabetes follow-up appointment and left message.      If patient calls back:   Schedule Office Visit appointment within 1 month with PCP, document that pt called and close encounter         Shraddha Childers MA

## 2018-07-12 NOTE — TELEPHONE ENCOUNTER
This writer attempted to contact patient on 07/12/18      Reason for call patient due for an office visit and left message with person answering the phone to have him call [place] to schedule an appointment with his provider.      If patient calls back:   Schedule Office Visit appointment within 1 week with primary care, document that pt called and close encounter         Adenike Gardner MA

## 2018-08-13 ENCOUNTER — OFFICE VISIT (OUTPATIENT)
Dept: FAMILY MEDICINE | Facility: CLINIC | Age: 43
End: 2018-08-13
Payer: COMMERCIAL

## 2018-08-13 ENCOUNTER — OFFICE VISIT (OUTPATIENT)
Dept: OPHTHALMOLOGY | Facility: CLINIC | Age: 43
End: 2018-08-13
Payer: COMMERCIAL

## 2018-08-13 VITALS
SYSTOLIC BLOOD PRESSURE: 128 MMHG | WEIGHT: 223 LBS | BODY MASS INDEX: 33.91 KG/M2 | DIASTOLIC BLOOD PRESSURE: 82 MMHG | TEMPERATURE: 98.1 F | OXYGEN SATURATION: 97 % | HEART RATE: 81 BPM

## 2018-08-13 DIAGNOSIS — E11.65 TYPE 2 DIABETES MELLITUS WITH HYPERGLYCEMIA, WITHOUT LONG-TERM CURRENT USE OF INSULIN (H): Primary | ICD-10-CM

## 2018-08-13 DIAGNOSIS — H40.003 GLAUCOMA SUSPECT OF BOTH EYES: Primary | ICD-10-CM

## 2018-08-13 DIAGNOSIS — Z13.89 SCREENING FOR DIABETIC PERIPHERAL NEUROPATHY: ICD-10-CM

## 2018-08-13 DIAGNOSIS — E78.5 HYPERLIPIDEMIA LDL GOAL <100: ICD-10-CM

## 2018-08-13 PROCEDURE — 99207 C FOOT EXAM  NO CHARGE: CPT | Performed by: FAMILY MEDICINE

## 2018-08-13 PROCEDURE — 92083 EXTENDED VISUAL FIELD XM: CPT | Performed by: OPHTHALMOLOGY

## 2018-08-13 PROCEDURE — 92133 CPTRZD OPH DX IMG PST SGM ON: CPT | Performed by: OPHTHALMOLOGY

## 2018-08-13 PROCEDURE — 92014 COMPRE OPH EXAM EST PT 1/>: CPT | Performed by: OPHTHALMOLOGY

## 2018-08-13 PROCEDURE — 99214 OFFICE O/P EST MOD 30 MIN: CPT | Performed by: FAMILY MEDICINE

## 2018-08-13 RX ORDER — GLIPIZIDE 10 MG/1
20 TABLET, FILM COATED, EXTENDED RELEASE ORAL DAILY
Qty: 180 TABLET | Refills: 1 | Status: SHIPPED | OUTPATIENT
Start: 2018-08-13 | End: 2019-03-03

## 2018-08-13 RX ORDER — DAPAGLIFLOZIN 10 MG/1
10 TABLET, FILM COATED ORAL DAILY
Qty: 90 TABLET | Refills: 1 | Status: SHIPPED | OUTPATIENT
Start: 2018-08-13 | End: 2019-03-03

## 2018-08-13 ASSESSMENT — REFRACTION_MANIFEST
OD_AXIS: 005
OS_SPHERE: -0.25
OS_AXIS: 010
OD_CYLINDER: +0.50
OD_SPHERE: -0.50
OS_CYLINDER: +0.50

## 2018-08-13 ASSESSMENT — VISUAL ACUITY
OD_SC: 20/20
METHOD: SNELLEN - LINEAR
OS_SC: 20/15

## 2018-08-13 ASSESSMENT — CUP TO DISC RATIO
OD_RATIO: 0.3
OS_RATIO: 0.3

## 2018-08-13 ASSESSMENT — TONOMETRY
IOP_METHOD: ICARE
OS_IOP_MMHG: 20
OD_IOP_MMHG: 24

## 2018-08-13 ASSESSMENT — SLIT LAMP EXAM - LIDS
COMMENTS: NORMAL
COMMENTS: NORMAL

## 2018-08-13 ASSESSMENT — EXTERNAL EXAM - LEFT EYE: OS_EXAM: NORMAL

## 2018-08-13 ASSESSMENT — EXTERNAL EXAM - RIGHT EYE: OD_EXAM: NORMAL

## 2018-08-13 ASSESSMENT — CONF VISUAL FIELD: COMMENTS: HVF DONE TODAY

## 2018-08-13 ASSESSMENT — PAIN SCALES - GENERAL: PAINLEVEL: NO PAIN (0)

## 2018-08-13 NOTE — PATIENT INSTRUCTIONS
Continue current medication.  Continue with the dietary changes you have been making.    Follow up labs in October.  Follow up with visit in 6 months with Physical with fasting labs.        
No

## 2018-08-13 NOTE — MR AVS SNAPSHOT
After Visit Summary   8/13/2018    Jose Crews    MRN: [id number]           Patient Information     Date Of Birth          1975        Visit Information        Provider Department      8/13/2018 7:30 AM Jeff Adkins MD;  OPHTH NURSE ONLY [facility]        Today's Diagnoses     Glaucoma suspect of both eyes    -  1       Follow-ups after your visit        Your next 10 appointments already scheduled     Jul 09, 2019  9:00 AM CDT   Return Visit with Klarissa Cancino MD   [facility] ([facility]    [address] [address] [place] [address] [phone]              Who to contact     If you have questions or need follow up information about today's clinic visit or your schedule please contact [facility] directly at [phone].  Normal or non-critical lab and imaging results will be communicated to you by Synacorhart, letter or phone within 4 business days after the clinic has received the results. If you do not hear from us within 7 days, please contact the clinic through Synacorhart or phone. If you have a critical or abnormal lab result, we will notify you by phone as soon as possible.  Submit refill requests through [employer] or call your pharmacy and they will forward the refill request to us. Please allow 3 business days for your refill to be completed.          Additional Information About Your Visit        MyChart Information     [employer] gives you secure access to your electronic health record. If you see a primary care provider, you can also send messages to your care team and make appointments. If you have questions, please call your primary care clinic.  If you do not have a primary care provider, please call [phone] and they will assist you.      [employer] is an electronic gateway that provides easy, online access to your medical records. With [employer], you can request a clinic appointment, read your test  results, renew a prescription or communicate with your care team.     To access your existing account, please contact your [facility] or call [phone] for assistance.        Care EveryWhere ID     This is your Care EveryWhere ID. This could be used by other organizations to access your [facility] medical records  [id number]         Blood Pressure from Last 3 Encounters:   08/13/18 128/82   02/01/18 126/80   07/06/17 120/86    Weight from Last 3 Encounters:   08/13/18 101.2 kg (223 lb)   02/01/18 100.5 kg (221 lb 8 oz)   07/06/17 99.3 kg (219 lb)              We Performed the Following     EYE EXAM, EST PATIENT,COMPREHESV     EYE EXAM, EST PATIENT,COMPREHESV     HVF 24-2 OU     OCT Optic Nerve RNFL Optovue OU (both eyes)          Today's Medication Changes          These changes are accurate as of 8/13/18 11:59 PM.  If you have any questions, ask your nurse or doctor.               These medicines have changed or have updated prescriptions.        Dose/Directions    glipiZIDE 10 MG 24 hr tablet   Commonly known as:  GLUCOTROL XL   This may have changed:  See the new instructions.   Used for:  Type 2 diabetes mellitus with hyperglycemia, without long-term current use of insulin (H)   Changed by:  Steffanie Villa MD        Dose:  20 mg   Take 2 tablets (20 mg) by mouth daily   Quantity:  180 tablet   Refills:  1       metFORMIN 1000 MG tablet   Commonly known as:  GLUCOPHAGE   This may have changed:  See the new instructions.   Used for:  Type 2 diabetes mellitus with hyperglycemia, without long-term current use of insulin (H)   Changed by:  Steffanie Villa MD        Dose:  1000 mg   Take 1 tablet (1,000 mg) by mouth 2 times daily (with meals)   Quantity:  180 tablet   Refills:  1            Where to get your medicines      These medications were sent to [facility] - [place], [place] - [address] AT Portal to Registered [facility] Sites  [address],  [facility] [address]     Phone:  [phone]     dapagliflozin 10 MG Tabs tablet    glipiZIDE 10 MG 24 hr tablet    metFORMIN 1000 MG tablet                Primary Care Provider Office Phone # Fax #    Steffanie Villa [phone] [address]  [facility] [address]        Equal Access to Services     [facility]: Hadii aad ku hadasho Soomaali, waaxda luqadaha, qaybta kaalmada adeegyada, waxay idiin hayaan adeeg kharash la'aan . So [facility] [phone].    ATENCIÓN: Si habla español, tiene a graham disposición servicios gratuitos de asistencia lingüística. Llame al [phone].    We comply with applicable federal civil rights laws and [place] laws. We do not discriminate on the basis of race, color, national origin, age, disability, sex, sexual orientation, or gender identity.            Thank you!     Thank you for choosing [facility]  for your care. Our goal is always to provide you with excellent care. Hearing back from our patients is one way we can continue to improve our services. Please take a few minutes to complete the written survey that you may receive in the mail after your visit with us. Thank you!             Your Updated Medication List - Protect others around you: Learn how to safely use, store and throw away your medicines at www.disposemymeds.org.          This list is accurate as of 8/13/18 11:59 PM.  Always use your most recent med list.                   Brand Name Dispense Instructions for use Diagnosis    * ACE/ARB/ARNI NOT PRESCRIBED (INTENTIONAL)      by Other route continuous prn.    Type 2 diabetes, HbA1c goal < 7% (H)       * ARB NOT PRESCRIBED (INTENTIONAL)      by Other route continuous prn.    Type 2 diabetes, HbA1c goal < 7% (H)       aspirin 81 MG tablet      Take 1 tablet by mouth daily. *        blood glucose calibration Normal solution     2 Bottle    Use to calibrate blood glucose monitor as directed.    Type 2 diabetes mellitus with  hyperglycemia, without long-term current use of insulin (H)       blood glucose monitoring lancets     100 each    1 each daily    Type 2 diabetes mellitus with hyperglycemia, without long-term current use of insulin (H)       * blood glucose monitoring test strip    no brand specified    3 Month    by In Vitro route 1- 2 times daily. Use daily to test blood sugar.with kelly contour.    Type 2 diabetes, HbA1c goal < 7% (H)       * blood glucose monitoring test strip    KELLY CONTOUR    100 each    TEST BLOOD SUGAR 1 TIMES A DAY OR AS DIRECTED    Type 2 diabetes mellitus with hyperglycemia, without long-term current use of insulin (H)       DAILY MULTIVITAMIN PO      Take 1 tablet by mouth daily.        dapagliflozin 10 MG Tabs tablet    FARXIGA    90 tablet    Take 1 tablet (10 mg) by mouth daily    Type 2 diabetes mellitus with hyperglycemia, without long-term current use of insulin (H)       glipiZIDE 10 MG 24 hr tablet    GLUCOTROL XL    180 tablet    Take 2 tablets (20 mg) by mouth daily    Type 2 diabetes mellitus with hyperglycemia, without long-term current use of insulin (H)       ketoconazole 2 % shampoo    NIZORAL    120 mL    Apply to the affected area and wash off after 5 minutes, use three times weekly    Dermatitis, seborrheic       metFORMIN 1000 MG tablet    GLUCOPHAGE    180 tablet    Take 1 tablet (1,000 mg) by mouth 2 times daily (with meals)    Type 2 diabetes mellitus with hyperglycemia, without long-term current use of insulin (H)       simvastatin 40 MG tablet    ZOCOR    90 tablet    Take 1 tablet (40 mg) by mouth At Bedtime    Hyperlipidemia LDL goal <100       * Notice:  This list has 4 medication(s) that are the same as other medications prescribed for you. Read the directions carefully, and ask your doctor or other care provider to review them with you.

## 2018-08-13 NOTE — MR AVS SNAPSHOT
After Visit Summary   8/13/2018    Jose Crews    MRN: [id number]           Patient Information     Date Of Birth          1975        Visit Information        Provider Department      8/13/2018 10:20 AM Steffanie Villa MD [facility]        Today's Diagnoses     Type 2 diabetes mellitus with hyperglycemia, without long-term current use of insulin (H)    -  1    Screening for diabetic peripheral neuropathy        Hyperlipidemia LDL goal <100          Care Instructions    Continue current medication.  Continue with the dietary changes you have been making.    Follow up labs in October.  Follow up with visit in 6 months with Physical with fasting labs.                Follow-ups after your visit        Your next 10 appointments already scheduled     Jul 09, 2019  9:00 AM CDT   Return Visit with Klarissa Cancino MD   [facility]    [address] [place] [address] [phone]              Future tests that were ordered for you today     Open Future Orders        Priority Expected Expires Ordered    **A1C FUTURE anytime Routine 8/13/2018 8/13/2019 8/13/2018            Who to contact     If you have questions or need follow up information about today's clinic visit or your schedule please contact [facility] directly at [phone].  Normal or non-critical lab and imaging results will be communicated to you by MyChart, letter or phone within 4 business days after the clinic has received the results. If you do not hear from us within 7 days, please contact the clinic through MyChart or phone. If you have a critical or abnormal lab result, we will notify you by phone as soon as possible.  Submit refill requests through [employer] or call your pharmacy and they will forward the refill request to us. Please allow 3 business days for your refill to be completed.          Additional Information About Your Visit         Swarm Information     [employer] gives you secure access to your electronic health record. If you see a primary care provider, you can also send messages to your care team and make appointments. If you have questions, please call your primary care clinic.  If you do not have a primary care provider, please call [phone] and they will assist you.        Care EveryWhere ID     This is your Care EveryWhere ID. This could be used by other organizations to access your [facility] medical records  [id number]        Your Vitals Were     Pulse Temperature Pulse Oximetry BMI (Body Mass Index)          81 98.1  F (36.7  C) (Oral) 97% 33.91 kg/m2         Blood Pressure from Last 3 Encounters:   08/13/18 128/82   02/01/18 126/80   07/06/17 120/86    Weight from Last 3 Encounters:   08/13/18 101.2 kg (223 lb)   02/01/18 100.5 kg (221 lb 8 oz)   07/06/17 99.3 kg (219 lb)              We Performed the Following     FOOT EXAM  NO CHARGE [23348.114]        Primary Care Provider Office Phone # Fax #    Steffanie Villa [phone] [address]  [facility] [address]        Equal Access to Services     YODIT PONCE : Hadii aad ku hadasho Soomaali, waaxda luqadaha, qaybta kaalmada adeegyada, arlene eastonin haytoshian manas mcmahan. So [facility] [phone].    ATENCIÓN: Si habla español, tiene a graham disposición servicios gratuitos de asistencia lingüística. Llame al [phone].    We comply with applicable federal civil rights laws and [place] laws. We do not discriminate on the basis of race, color, national origin, age, disability, sex, sexual orientation, or gender identity.            Thank you!     Thank you for choosing [facility]  for your care. Our goal is always to provide you with excellent care. Hearing back from our patients is one way we can continue to improve our services. Please take a few minutes to complete the written survey that you may receive in the mail after your  visit with us. Thank you!             Your Updated Medication List - Protect others around you: Learn how to safely use, store and throw away your medicines at www.disposemymeds.org.          This list is accurate as of 8/13/18 10:57 AM.  Always use your most recent med list.                   Brand Name Dispense Instructions for use Diagnosis    * ACE/ARB/ARNI NOT PRESCRIBED (INTENTIONAL)      by Other route continuous prn.    Type 2 diabetes, HbA1c goal < 7% (H)       * ARB NOT PRESCRIBED (INTENTIONAL)      by Other route continuous prn.    Type 2 diabetes, HbA1c goal < 7% (H)       aspirin 81 MG tablet      Take 1 tablet by mouth daily. *        blood glucose calibration Normal solution     2 Bottle    Use to calibrate blood glucose monitor as directed.    Type 2 diabetes mellitus with hyperglycemia, without long-term current use of insulin (H)       blood glucose monitoring lancets     100 each    1 each daily    Type 2 diabetes mellitus with hyperglycemia, without long-term current use of insulin (H)       * blood glucose monitoring test strip    no brand specified    3 Month    by In Vitro route 1- 2 times daily. Use daily to test blood sugar.with kelly contour.    Type 2 diabetes, HbA1c goal < 7% (H)       * blood glucose monitoring test strip    KELLY CONTOUR    100 each    TEST BLOOD SUGAR 1 TIMES A DAY OR AS DIRECTED    Type 2 diabetes mellitus with hyperglycemia, without long-term current use of insulin (H)       DAILY MULTIVITAMIN PO      Take 1 tablet by mouth daily.        dapagliflozin 10 MG Tabs tablet    FARXIGA    30 tablet    Take 1 tablet (10 mg) by mouth daily    Type 2 diabetes mellitus with hyperglycemia, without long-term current use of insulin (H)       glipiZIDE 10 MG 24 hr tablet    GLUCOTROL XL    180 tablet    TAKE 2 TABLETS DAILY    Type 2 diabetes mellitus with hyperglycemia, without long-term current use of insulin (H)       ketoconazole 2 % shampoo    NIZORAL    120 mL    Apply to  the affected area and wash off after 5 minutes, use three times weekly    Dermatitis, seborrheic       metFORMIN 1000 MG tablet    GLUCOPHAGE    180 tablet    TAKE 1 TABLET TWICE A DAY  WITH MEALS    Type 2 diabetes mellitus with hyperglycemia, without long-term current use of insulin (H)       simvastatin 40 MG tablet    ZOCOR    90 tablet    Take 1 tablet (40 mg) by mouth At Bedtime    Hyperlipidemia LDL goal <100       * Notice:  This list has 4 medication(s) that are the same as other medications prescribed for you. Read the directions carefully, and ask your doctor or other care provider to review them with you.

## 2018-08-13 NOTE — NURSING NOTE
Patient presents with:  Diabetic Eye Exam: Pt is Type II DM, BS runs 150 in the mornings, A1c 7-6-18  8.2.   Glaucoma Suspect Follow Up: Pt denties any gtts.   COMPREHENSIVE EYE EXAM: No VA changes.      Referring Provider:  No referring provider defined for this encounter.    HPI    Last Eye Exam:  4/10/17   Informant(s):  EMR   Symptoms:              Comments:  No burning itching, watering, flashes or floaters.              Eve Simpson COT

## 2018-08-13 NOTE — PROGRESS NOTES
SUBJECTIVE:   Jose Crews is a 42 year old male who presents to clinic today for the following health issues:      Diabetes Follow-up    Patient is checking blood sugars:  Every other day.  Results are as follows:         am - 150    Diabetic concerns: None     Symptoms of hypoglycemia (low blood sugar): none     Paresthesias (numbness or burning in feet) or sores: No     Date of last diabetic eye exam: 8/13/17    BP Readings from Last 2 Encounters:   08/13/18 128/82   02/01/18 126/80     Hemoglobin A1C (%)   Date Value   07/06/2018 8.2 (H)   01/29/2018 7.3 (H)     LDL Cholesterol Calculated (mg/dL)   Date Value   01/29/2018 88   11/02/2016 48       Diabetes Management Resources  Hyperlipidemia Follow-Up      Rate your low fat/cholesterol diet?: not monitoring fat    Taking statin?  Yes, no muscle aches from statin    Other lipid medications/supplements?:  none      Amount of exercise or physical activity:  5 days/week for an average of 15-30 minutes - walking - more energy, sleeping better with this.     Problems taking medications regularly: No    Medication side effects: none    Diet: regular (no restrictions)            Problem list and histories reviewed & adjusted, as indicated.  Additional history: as documented    BP Readings from Last 3 Encounters:   08/13/18 128/82   02/01/18 126/80   07/06/17 120/86    Wt Readings from Last 3 Encounters:   08/13/18 101.2 kg (223 lb)   02/01/18 100.5 kg (221 lb 8 oz)   07/06/17 99.3 kg (219 lb)                    Reviewed and updated as needed this visit by clinical staff  Tobacco  Allergies  Meds  Med Hx  Surg Hx  Fam Hx  Soc Hx      Reviewed and updated as needed this visit by Provider  Tobacco  Allergies  Meds  Med Hx  Surg Hx  Fam Hx  Soc Hx        ROS:  Constitutional, HEENT, cardiovascular, pulmonary, gi and gu systems are negative, except as otherwise noted.    OBJECTIVE:     /82 (BP Location: Right arm, Patient Position: Chair, Cuff Size:  Adult Large)  Pulse 81  Temp 98.1  F (36.7  C) (Oral)  Wt 101.2 kg (223 lb)  SpO2 97%  BMI 33.91 kg/m2  Body mass index is 33.91 kg/(m^2).  GENERAL: alert, no distress and obese  NECK: no adenopathy, no asymmetry, masses, or scars and thyroid normal to palpation  RESP: lungs clear to auscultation - no rales, rhonchi or wheezes  CV: regular rate and rhythm, normal S1 S2, no S3 or S4, no murmur, click or rub, no peripheral edema and peripheral pulses strong  ABDOMEN: soft, nontender, no hepatosplenomegaly, no masses and bowel sounds normal  MS: no gross musculoskeletal defects noted, no edema  PSYCH: mentation appears normal, affect normal/bright  Diabetic foot exam: normal DP and PT pulses, no trophic changes or ulcerative lesions, normal sensory exam and normal monofilament exam    Diagnostic Test Results:  Results for orders placed or performed in visit on 07/06/18   **Comprehensive metabolic panel FUTURE anytime   Result Value Ref Range    Sodium 140 133 - 144 mmol/L    Potassium 4.0 3.4 - 5.3 mmol/L    Chloride 104 94 - 109 mmol/L    Carbon Dioxide 25 20 - 32 mmol/L    Anion Gap 11 3 - 14 mmol/L    Glucose 212 (H) 70 - 99 mg/dL    Urea Nitrogen 16 7 - 30 mg/dL    Creatinine 0.79 0.66 - 1.25 mg/dL    GFR Estimate >90 >60 mL/min/1.7m2    GFR Estimate If Black >90 >60 mL/min/1.7m2    Calcium 8.9 8.5 - 10.1 mg/dL    Bilirubin Total 0.9 0.2 - 1.3 mg/dL    Albumin 4.6 3.4 - 5.0 g/dL    Protein Total 7.8 6.8 - 8.8 g/dL    Alkaline Phosphatase 96 40 - 150 U/L    ALT 90 (H) 0 - 70 U/L    AST 34 0 - 45 U/L   **CBC with platelets FUTURE anytime   Result Value Ref Range    WBC 8.1 4.0 - 11.0 10e9/L    RBC Count 5.11 4.4 - 5.9 10e12/L    Hemoglobin 16.5 13.3 - 17.7 g/dL    Hematocrit 47.7 40.0 - 53.0 %    MCV 93 78 - 100 fl    MCH 32.3 26.5 - 33.0 pg    MCHC 34.6 31.5 - 36.5 g/dL    RDW 12.6 10.0 - 15.0 %    Platelet Count 192 150 - 450 10e9/L   **A1C FUTURE anytime   Result Value Ref Range    Hemoglobin A1C 8.2 (H) 0 -  "5.6 %       ASSESSMENT/PLAN:     Diabetes Type II, A1c Uncontrolled, non-insulin dependent   Associated with the following complications    Renal Complications:  None    Ophthalmologic Complications: None    Neurologic Complications: None    Peripheral Vascular Complications:  None    Other: None   Plan:  Labs:  See orders - due in October 2018  Medications:  No changes  Other:  Dietary changes stressed, Regular exercise and Weight loss        BMI:   Estimated body mass index is 33.91 kg/(m^2) as calculated from the following:    Height as of 2/1/18: 1.727 m (5' 8\").    Weight as of this encounter: 101.2 kg (223 lb).   Weight management plan: Discussed healthy diet and exercise guidelines and patient will follow up in 6 months in clinic to re-evaluate.   Wt Readings from Last 5 Encounters:   08/13/18 101.2 kg (223 lb)   02/01/18 100.5 kg (221 lb 8 oz)   07/06/17 99.3 kg (219 lb)   10/31/16 99.3 kg (219 lb)   04/01/16 100.6 kg (221 lb 12.8 oz)         2. Screening for diabetic peripheral neuropathy  - FOOT EXAM  NO CHARGE [58718.114]    3. Hyperlipidemia LDL goal <100  Continue current simvastatin dose.        Patient Instructions   Continue current medication.  Continue with the dietary changes you have been making.    Follow up labs in October.  Follow up with visit in 6 months with Physical with fasting labs.            Steffanie Villa MD  [facility]  "

## 2018-08-28 NOTE — PROGRESS NOTES
Assessment & Plan   Jose Crews is a 43 year old male who presents with:   Review of systems for the eyes was negative other than the pertinent positives and negatives noted in the HPI.    Glaucoma suspect of both eyes  - OCT Optic Nerve RNFL Optovue OU (both eyes)  - HVF 24-2 OU  - EYE EXAM, EST PATIENT,COMPREHESV  - EYE EXAM, EST PATIENT,COMPREHESV    Return in 12 moths for annual exam.      Attending Physician Attestation:  Complete documentation of historical and exam elements from today's encounter can be found in the full encounter summary report (not reduplicated in this progress note).  I personally obtained the chief complaint(s) and history of present illness.  I confirmed and edited as necessary the review of systems, past medical/surgical history, family history, social history, and examination findings as documented by others; and I examined the patient myself.  I personally reviewed the relevant tests, images, and reports as documented above.  I formulated and edited as necessary the assessment and plan and discussed the findings and management plan with the patient and family. - Jeff Adkins MD

## 2018-12-14 ENCOUNTER — DOCUMENTATION ONLY (OUTPATIENT)
Dept: FAMILY MEDICINE | Facility: CLINIC | Age: 43
End: 2018-12-14

## 2018-12-14 DIAGNOSIS — E11.65 TYPE 2 DIABETES MELLITUS WITH HYPERGLYCEMIA, WITHOUT LONG-TERM CURRENT USE OF INSULIN (H): ICD-10-CM

## 2018-12-14 DIAGNOSIS — E11.65 TYPE 2 DIABETES MELLITUS WITH HYPERGLYCEMIA, WITHOUT LONG-TERM CURRENT USE OF INSULIN (H): Primary | ICD-10-CM

## 2018-12-14 LAB
GLUCOSE SERPL-MCNC: 175 MG/DL (ref 70–99)
HBA1C MFR BLD: 8.1 % (ref 0–5.6)

## 2018-12-14 PROCEDURE — 36415 COLL VENOUS BLD VENIPUNCTURE: CPT | Performed by: FAMILY MEDICINE

## 2018-12-14 PROCEDURE — 83036 HEMOGLOBIN GLYCOSYLATED A1C: CPT | Performed by: FAMILY MEDICINE

## 2018-12-14 PROCEDURE — 82947 ASSAY GLUCOSE BLOOD QUANT: CPT | Performed by: FAMILY MEDICINE

## 2018-12-14 NOTE — PROGRESS NOTES
Please place or confirm orders for upcoming lab appointment on 12/14/2018 patient also wanted fasting glucose labs    Thank You  Stacey TAVERA CMA.

## 2019-01-16 DIAGNOSIS — E78.5 HYPERLIPIDEMIA LDL GOAL <100: ICD-10-CM

## 2019-01-16 NOTE — TELEPHONE ENCOUNTER
"Requested Prescriptions   Pending Prescriptions Disp Refills     simvastatin (ZOCOR) 40 MG tablet [Pharmacy Med Name: SIMVASTATIN  TAB 40MG] 90 tablet 3     Sig: TAKE 1 TABLET AT BEDTIME    Statins Protocol Passed - 1/16/2019  7:11 AM       Passed - LDL on file in past 12 months    Recent Labs   Lab Test 01/29/18  0756   LDL 88            Passed - No abnormal creatine kinase in past 12 months    No lab results found.            Passed - Recent (12 mo) or future (30 days) visit within the authorizing provider's specialty    Patient had office visit in the last 12 months or has a visit in the next 30 days with authorizing provider or within the authorizing provider's specialty.  See \"Patient Info\" tab in inbasket, or \"Choose Columns\" in Meds & Orders section of the refill encounter.             Passed - Medication is active on med list       Passed - Patient is age 18 or older        simvastatin (ZOCOR) 40 MG tablet  Last Written Prescription Date:  2/1/18  Last Fill Quantity: 90,  # refills: 3   Last office visit: 8/13/2018 with prescribing provider:  Dr. Villa   Future Office Visit:      "

## 2019-01-18 NOTE — TELEPHONE ENCOUNTER
Routing refill request to provider for review/approval because:  RN can only give 30 but pharmacy is a mail order. Patient labs will be a year old on 1/29/19.    Kiara Reynaag RN, [facility]

## 2019-01-20 RX ORDER — SIMVASTATIN 40 MG
TABLET ORAL
Qty: 90 TABLET | Refills: 0 | Status: SHIPPED | OUTPATIENT
Start: 2019-01-20 | End: 2019-07-05

## 2019-02-27 DIAGNOSIS — E11.65 TYPE 2 DIABETES MELLITUS WITH HYPERGLYCEMIA, WITHOUT LONG-TERM CURRENT USE OF INSULIN (H): ICD-10-CM

## 2019-02-27 NOTE — TELEPHONE ENCOUNTER
Reason for Call:   new pharmacy / refills    Detailed comments: is using [facility] from now;     Phone Number Patient can be reached at: Home number on file [phone] (home)    Best Time: any    Can we leave a detailed message on this number? Not Applicable    Call taken on 2/27/2019 at 1:56 PM by Savanna Sousa

## 2019-02-27 NOTE — TELEPHONE ENCOUNTER
"Requested Prescriptions   Pending Prescriptions Disp Refills     glipiZIDE (GLUCOTROL XL) 10 MG 24 hr tablet 180 tablet 1     Sig: Take 2 tablets (20 mg) by mouth daily    Sulfonylurea Agents Failed - 2/27/2019  1:57 PM       Failed - Blood pressure less than 140/90 in past 6 months    BP Readings from Last 3 Encounters:   08/13/18 128/82   02/01/18 126/80   07/06/17 120/86                Failed - Patient has documented LDL within the past 12 mos.    Recent Labs   Lab Test 01/29/18  0756   LDL 88            Failed - Recent (6 mo) or future (30 days) visit within the authorizing provider's specialty    Patient had office visit in the last 6 months or has a visit in the next 30 days with authorizing provider or within the authorizing provider's specialty.  See \"Patient Info\" tab in inbasket, or \"Choose Columns\" in Meds & Orders section of the refill encounter.           Passed - Patient has had a Microalbumin in the past 12 mos.    Recent Labs   Lab Test 01/29/18  0756   MICROL 11   UMALCR 9.65            Passed - Patient has documented A1c within the specified period of time.    If HgbA1C is 8 or greater, it needs to be on file within the past 3 months.  If less than 8, must be on file within the past 6 months.     Recent Labs   Lab Test 12/14/18  0816   A1C 8.1*            Passed - Medication is active on med list       Passed - Patient is age 18 or older       Passed - Patient has a recent creatinine (normal) within the past 12 mos.    Recent Labs   Lab Test 07/06/18  0756   CR 0.79             metFORMIN (GLUCOPHAGE) 1000 MG tablet 180 tablet 1     Sig: Take 1 tablet (1,000 mg) by mouth 2 times daily (with meals)    Biguanide Agents Failed - 2/27/2019  1:57 PM       Failed - Blood pressure less than 140/90 in past 6 months    BP Readings from Last 3 Encounters:   08/13/18 128/82   02/01/18 126/80   07/06/17 120/86                Failed - Patient has documented LDL within the past 12 mos.    Recent Labs   Lab Test " "01/29/18  0756   LDL 88            Failed - Recent (6 mo) or future (30 days) visit within the authorizing provider's specialty    Patient had office visit in the last 6 months or has a visit in the next 30 days with authorizing provider or within the authorizing provider's specialty.  See \"Patient Info\" tab in inbasket, or \"Choose Columns\" in Meds & Orders section of the refill encounter.           Passed - Patient has had a Microalbumin in the past 15 mos.    Recent Labs   Lab Test 01/29/18  0756   MICROL 11   UMALCR 9.65            Passed - Patient is age 10 or older       Passed - Patient has documented A1c within the specified period of time.    If HgbA1C is 8 or greater, it needs to be on file within the past 3 months.  If less than 8, must be on file within the past 6 months.     Recent Labs   Lab Test 12/14/18  0816   A1C 8.1*            Passed - Patient's CR is NOT>1.4 OR Patient's EGFR is NOT<45 within past 12 mos.    Recent Labs   Lab Test 07/06/18  0756   GFRESTIMATED >90   GFRESTBLACK >90       Recent Labs   Lab Test 07/06/18  0756   CR 0.79            Passed - Patient does NOT have a diagnosis of CHF.       Passed - Medication is active on med list        dapagliflozin (FARXIGA) 10 MG TABS tablet 90 tablet 1     Sig: Take 1 tablet (10 mg) by mouth daily    Sodium Glucose Co-Transport Inhibitor Agents Failed - 2/27/2019  1:57 PM       Failed - Blood pressure less than 140/90 in past 6 months    BP Readings from Last 3 Encounters:   08/13/18 128/82   02/01/18 126/80   07/06/17 120/86                Failed - Patient has documented LDL within the past 12 mos.    Recent Labs   Lab Test 01/29/18  0756   LDL 88            Failed - Recent (6 mo) or future (30 days) visit within the authorizing provider's specialty    Patient had office visit in the last 6 months or has a visit in the next 30 days with authorizing provider or within the authorizing provider's specialty.  See \"Patient Info\" tab in inbasket, or " "\"Choose Columns\" in Meds & Orders section of the refill encounter.           Passed - Patient has had a Microalbumin in the past 15 mos.    Recent Labs   Lab Test 01/29/18  0756   MICROL 11   UMALCR 9.65            Passed - Patient has documented A1c within the specified period of time.    If HgbA1C is 8 or greater, it needs to be on file within the past 3 months.  If less than 8, must be on file within the past 6 months.     Recent Labs   Lab Test 12/14/18  0816   A1C 8.1*            Passed - No creatinine >1.4 or GFR <45 within the past 12 mos    Recent Labs   Lab Test 07/06/18  0756   GFRESTIMATED >90   GFRESTBLACK >90       Recent Labs   Lab Test 07/06/18  0756   CR 0.79            Passed - Medication is active on med list       Passed - Patient is age 18 or older       Passed - Patient has documented normal Potassium within the last 12 mos.    Recent Labs   Lab Test 07/06/18  0756   POTASSIUM 4.0             glipiZIDE (GLUCOTROL XL) 10 MG 24 hr tablet  Last Written Prescription Date:  8/13/18  Last Fill Quantity: 180,  # refills: 1   Last office visit: 8/13/2018 with prescribing provider:  Dr. Villa   Future Office Visit:      metFORMIN (GLUCOPHAGE) 1000 MG tablet  Last Written Prescription Date:  8/13/18  Last Fill Quantity: 180,  # refills: 1   Last office visit: 8/13/2018 with prescribing provider:  Dr. Villa   Future Office Visit:      dapagliflozin (FARXIGA) 10 MG TABS tablet  Last Written Prescription Date:  8/13/18  Last Fill Quantity: 90,  # refills: 1   Last office visit: 8/13/2018 with prescribing provider:  Dr. Villa   Future Office Visit:      "

## 2019-03-01 NOTE — TELEPHONE ENCOUNTER
Routing refill request to provider for review/approval because:  Fails protocol, please review and advise on refill request.     Pat Adan RN

## 2019-03-03 ENCOUNTER — MYC MEDICAL ADVICE (OUTPATIENT)
Dept: FAMILY MEDICINE | Facility: CLINIC | Age: 44
End: 2019-03-03

## 2019-03-03 DIAGNOSIS — E78.5 HYPERLIPIDEMIA LDL GOAL <100: Primary | ICD-10-CM

## 2019-03-03 DIAGNOSIS — E11.65 TYPE 2 DIABETES MELLITUS WITH HYPERGLYCEMIA, WITHOUT LONG-TERM CURRENT USE OF INSULIN (H): ICD-10-CM

## 2019-03-03 RX ORDER — GLIPIZIDE 10 MG/1
20 TABLET, FILM COATED, EXTENDED RELEASE ORAL DAILY
Qty: 180 TABLET | Refills: 1 | Status: SHIPPED | OUTPATIENT
Start: 2019-03-03 | End: 2019-07-07

## 2019-03-03 RX ORDER — DAPAGLIFLOZIN 10 MG/1
10 TABLET, FILM COATED ORAL DAILY
Qty: 90 TABLET | Refills: 1 | Status: SHIPPED | OUTPATIENT
Start: 2019-03-03 | End: 2019-07-07

## 2019-03-04 NOTE — TELEPHONE ENCOUNTER
Will need labs mohr but can have fasting labs done as part of his visit unless he wants to come a few days prior to visit for fasting labs.    BHARTI

## 2019-03-04 NOTE — TELEPHONE ENCOUNTER
Called patient he will come in prior to the visit for labs. Are you able to put in the orders so he can come in one ever.    Shraddha Childers MA on 3/4/2019 at 5:49 PM

## 2019-03-04 NOTE — TELEPHONE ENCOUNTER
Reason for Call:  Other Questions    Detailed comments: Patients Wife Elsa is calling regarding  would like to know if pt needs lab appointments with his office visit    Phone Number Patient can be reached at: Other phone number:  [phone]    Best Time: Any    Can we leave a detailed message on this number? YES    Call taken on 3/4/2019 at 12:27 PM by Rishabh Diallo

## 2019-06-13 ENCOUNTER — OFFICE VISIT (OUTPATIENT)
Dept: FAMILY MEDICINE | Facility: CLINIC | Age: 44
End: 2019-06-13
Payer: COMMERCIAL

## 2019-06-13 VITALS
OXYGEN SATURATION: 95 % | SYSTOLIC BLOOD PRESSURE: 128 MMHG | HEART RATE: 80 BPM | HEIGHT: 68 IN | WEIGHT: 218.5 LBS | TEMPERATURE: 98.2 F | BODY MASS INDEX: 33.12 KG/M2 | DIASTOLIC BLOOD PRESSURE: 86 MMHG

## 2019-06-13 DIAGNOSIS — Z00.00 ROUTINE GENERAL MEDICAL EXAMINATION AT A HEALTH CARE FACILITY: Primary | ICD-10-CM

## 2019-06-13 DIAGNOSIS — E11.65 TYPE 2 DIABETES MELLITUS WITH HYPERGLYCEMIA, WITHOUT LONG-TERM CURRENT USE OF INSULIN (H): ICD-10-CM

## 2019-06-13 DIAGNOSIS — E78.5 HYPERLIPIDEMIA LDL GOAL <100: ICD-10-CM

## 2019-06-13 DIAGNOSIS — B37.2 YEAST INFECTION OF THE SKIN: ICD-10-CM

## 2019-06-13 PROCEDURE — 99396 PREV VISIT EST AGE 40-64: CPT | Performed by: FAMILY MEDICINE

## 2019-06-13 PROCEDURE — 99207 C FOOT EXAM  NO CHARGE: CPT | Mod: 25 | Performed by: FAMILY MEDICINE

## 2019-06-13 RX ORDER — KETOCONAZOLE 20 MG/G
CREAM TOPICAL 2 TIMES DAILY
Qty: 60 G | Refills: 0 | Status: SHIPPED | OUTPATIENT
Start: 2019-06-13 | End: 2020-04-09

## 2019-06-13 RX ORDER — LANCETS
1 EACH MISCELLANEOUS DAILY
Qty: 100 EACH | Refills: 3 | Status: SHIPPED | OUTPATIENT
Start: 2019-06-13 | End: 2019-07-07

## 2019-06-13 ASSESSMENT — PAIN SCALES - GENERAL: PAINLEVEL: NO PAIN (0)

## 2019-06-13 ASSESSMENT — MIFFLIN-ST. JEOR: SCORE: 1856.74

## 2019-06-13 NOTE — PATIENT INSTRUCTIONS
Return to clinic for labs.    Refill will be update when results return.    Begin Nizoral cream twice a day to the rash in groin.       Preventive Health Recommendations  Male Ages 40 to 49    Yearly exam:             See your health care provider every year in order to  o   Review health changes.   o   Discuss preventive care.    o   Review your medicines if your doctor has prescribed any.    You should be tested each year for STDs (sexually transmitted diseases) if you re at risk.     Have a cholesterol test every 5 years.     Have a colonoscopy (test for colon cancer) if someone in your family has had colon cancer or polyps before age 50.     After age 45, have a diabetes test (fasting glucose). If you are at risk for diabetes, you should have this test every 3 years.      Talk with your health care provider about whether or not a prostate cancer screening test (PSA) is right for you.    Shots: Get a flu shot each year. Get a tetanus shot every 10 years.     Nutrition:    Eat at least 5 servings of fruits and vegetables daily.     Eat whole-grain bread, whole-wheat pasta and brown rice instead of white grains and rice.     Get adequate Calcium and Vitamin D.     Lifestyle    Exercise for at least 150 minutes a week (30 minutes a day, 5 days a week). This will help you control your weight and prevent disease.     Limit alcohol to one drink per day.     No smoking.     Wear sunscreen to prevent skin cancer.     See your dentist every six months for an exam and cleaning.

## 2019-06-13 NOTE — PROGRESS NOTES
venkata huerta  SUBJECTIVE:   CC: Jose Crews is an 43 year old male who presents for preventive health visit.     Healthy Habits:    Do you get at least three servings of calcium containing foods daily (dairy, green leafy vegetables, etc.)? yes    Amount of exercise or daily activities, outside of work: moderate walking, 3 days a week, 30mins  - no problems    Problems taking medications regularly No    Medication side effects: No    Have you had an eye exam in the past two years? yes    Do you see a dentist twice per year? yes    Do you have sleep apnea, excessive snoring or daytime drowsiness?no      Diabetes Follow-up      How often are you checking your blood sugar? A few times a week    What time of day are you checking your blood sugars (select all that apply)?  Before meals    Have you had any blood sugars above 200?  No    Have you had any blood sugars below 70?  No    What symptoms do you notice when your blood sugar is low?  Shaky and Dizzy    What concerns do you have today about your diabetes? None     Do you have any of these symptoms? (Select all that apply)  No numbness or tingling in feet.  No redness, sores or blisters on feet.  No complaints of excessive thirst.  No reports of blurry vision.  No significant changes to weight.     Have you had a diabetic eye exam in the last 12 months? Yes- Date of last eye exam: 8/28/18    BP Readings from Last 2 Encounters:   06/13/19 128/86   08/13/18 128/82     Hemoglobin A1C (%)   Date Value   12/14/2018 8.1 (H)   07/06/2018 8.2 (H)     LDL Cholesterol Calculated (mg/dL)   Date Value   01/29/2018 88   11/02/2016 48       Diabetes Management Resources  Hyperlipidemia Follow-Up      Are you having any of the following symptoms? (Select all that apply)  No complaints of shortness of breath, chest pain or pressure.  No increased sweating or nausea with activity.  No left-sided neck or arm pain.  No complaints of pain in calves when walking 1-2 blocks.    Are you  regularly taking any medication or supplement to lower your cholesterol?   Yes- statin    Are you having muscle aches or other side effects that you think could be caused by your cholesterol lowering medication?  No        Today's PHQ-2 Score:   PHQ-2 (  [employer]) 2019   Q1: Little interest or pleasure in doing things 0 0   Q2: Feeling down, depressed or hopeless 0 0   PHQ-2 Score 0 0       Abuse: Current or Past(Physical, Sexual or Emotional)- No  Do you feel safe in your environment? Yes    Social History     Tobacco Use     Smoking status: Former Smoker     Last attempt to quit: 2000     Years since quittin.4     Smokeless tobacco: Never Used   Substance Use Topics     Alcohol use: Yes     Comment: occassionally     If you drink alcohol do you typically have >3 drinks per day or >7 drinks per week? No                      Last PSA: No results found for: PSA    Reviewed orders with patient. Reviewed health maintenance and updated orders accordingly - Yes  BP Readings from Last 3 Encounters:   19 128/86   18 128/82   18 126/80    Wt Readings from Last 3 Encounters:   19 99.1 kg (218 lb 8 oz)   18 101.2 kg (223 lb)   18 100.5 kg (221 lb 8 oz)                    Reviewed and updated as needed this visit by clinical staff  Tobacco  Allergies  Meds  Med Hx  Surg Hx  Fam Hx  Soc Hx        Reviewed and updated as needed this visit by Provider  Tobacco  Allergies  Meds  Med Hx  Surg Hx  Fam Hx  Soc Hx       Past Medical History:   Diagnosis Date     Acne vulgaris      Balanitis 2009     History of atypical/dysplastic nevus 2010    back with positive margins.     MEDICAL HISTORY OF - age 12    hospitalized for a week with unexplained abdominal pain and vomiting     Type II or unspecified type diabetes mellitus without mention of complication, not stated as uncontrolled       Past Surgical History:   Procedure Laterality Date     HC TOOTH  "EXTRACTION W/FORCEP  2006       ROS:  10 point ROS of systems including Constitutional, Eyes, Respiratory, Cardiovascular, Gastroenterology, Genitourinary, Integumentary, Muscularskeletal, Psychiatric were all negative except for pertinent positives noted in my HPI.      OBJECTIVE:   /86 (BP Location: Left arm, Patient Position: Chair, Cuff Size: Adult Regular)   Pulse 80   Temp 98.2  F (36.8  C) (Oral)   Ht 1.729 m (5' 8.07\")   Wt 99.1 kg (218 lb 8 oz)   SpO2 95%   BMI 33.15 kg/m    EXAM:  GENERAL: healthy, alert and no distress  EYES: Eyes grossly normal to inspection, PERRL and conjunctivae and sclerae normal  HENT: ear canals and TM's normal, nose and mouth without ulcers or lesions  NECK: no adenopathy, no asymmetry, masses, or scars and thyroid normal to palpation  RESP: lungs clear to auscultation - no rales, rhonchi or wheezes  CV: regular rate and rhythm, normal S1 S2, no S3 or S4, no murmur, click or rub, no peripheral edema and peripheral pulses strong  ABDOMEN: soft, nontender, no hepatosplenomegaly, no masses and bowel sounds normal   (male): testicles normal without atrophy or masses, no hernias, penis normal without urethral discharge and whitened appearance of skin with mild cracking of the foreskin.  Unable to reduce foreskin (reports this is chronic)  MS: no gross musculoskeletal defects noted, no edema  SKIN: no suspicious lesions or rashes  NEURO: Normal strength and tone, mentation intact and speech normal  PSYCH: mentation appears normal, affect normal/bright  Diabetic foot exam: normal DP and PT pulses, no trophic changes or ulcerative lesions, normal sensory exam and normal monofilament exam    Diagnostic Test Results:  Labs reviewed in Epic    ASSESSMENT/PLAN:   1. Routine general medical examination at a health care facility  Screenings as noted.     2. Type 2 diabetes mellitus with hyperglycemia, without long-term current use of insulin (H)  Return to clinic for labs already " "in as future.  Refills when labs return.  - blood glucose (DEZ CONTOUR) test strip; TEST BLOOD SUGAR 1 TIMES A DAY OR AS DIRECTED  Dispense: 100 each; Refill: 3  - blood glucose monitoring (ONE TOUCH ULTRASOFT) lancets; 1 each daily  Dispense: 100 each; Refill: 3  - FOOT EXAM    3. Yeast infection of the skin  Topical use of the nizoral.  May be related to Farxiga - if symptoms not resolved or recur, consider medication change.  - ketoconazole (NIZORAL) 2 % external cream; Apply topically 2 times daily  Dispense: 60 g; Refill: 0    4. Hyperlipidemia LDL goal <100  Return to clinic for fasting labs.  Refill when results available.      COUNSELING:  Reviewed preventive health counseling, as reflected in patient instructions       Regular exercise       Healthy diet/nutrition       Vision screening       Family planning       Osteoporosis Prevention/Bone Health    Estimated body mass index is 33.15 kg/m  as calculated from the following:    Height as of this encounter: 1.729 m (5' 8.07\").    Weight as of this encounter: 99.1 kg (218 lb 8 oz).    Weight management plan: Discussed healthy diet and exercise guidelines     reports that he quit smoking about 19 years ago. He has never used smokeless tobacco.      Counseling Resources:  ATP IV Guidelines  Pooled Cohorts Equation Calculator  FRAX Risk Assessment  ICSI Preventive Guidelines  Dietary Guidelines for Americans, 2010  USDA's MyPlate  ASA Prophylaxis  Lung CA Screening    Steffanie Villa MD  [facility]    Patient Instructions   Return to clinic for labs.    Refill will be update when results return.    Begin Nizoral cream twice a day to the rash in groin.     "

## 2019-07-05 DIAGNOSIS — E78.5 HYPERLIPIDEMIA LDL GOAL <100: ICD-10-CM

## 2019-07-05 DIAGNOSIS — E11.65 TYPE 2 DIABETES MELLITUS WITH HYPERGLYCEMIA, WITHOUT LONG-TERM CURRENT USE OF INSULIN (H): ICD-10-CM

## 2019-07-05 LAB
ALBUMIN SERPL-MCNC: 4.3 G/DL (ref 3.4–5)
ALP SERPL-CCNC: 97 U/L (ref 40–150)
ALT SERPL W P-5'-P-CCNC: 63 U/L (ref 0–70)
ANION GAP SERPL CALCULATED.3IONS-SCNC: 7 MMOL/L (ref 3–14)
AST SERPL W P-5'-P-CCNC: 30 U/L (ref 0–45)
BILIRUB SERPL-MCNC: 0.8 MG/DL (ref 0.2–1.3)
BUN SERPL-MCNC: 13 MG/DL (ref 7–30)
CALCIUM SERPL-MCNC: 8.8 MG/DL (ref 8.5–10.1)
CHLORIDE SERPL-SCNC: 106 MMOL/L (ref 94–109)
CHOLEST SERPL-MCNC: 164 MG/DL
CO2 SERPL-SCNC: 26 MMOL/L (ref 20–32)
CREAT SERPL-MCNC: 0.82 MG/DL (ref 0.66–1.25)
CREAT UR-MCNC: 117 MG/DL
GFR SERPL CREATININE-BSD FRML MDRD: >90 ML/MIN/{1.73_M2}
GLUCOSE SERPL-MCNC: 168 MG/DL (ref 70–99)
HBA1C MFR BLD: 7.9 % (ref 0–5.6)
HDLC SERPL-MCNC: 31 MG/DL
LDLC SERPL CALC-MCNC: 89 MG/DL
MICROALBUMIN UR-MCNC: 11 MG/L
MICROALBUMIN/CREAT UR: 9.15 MG/G CR (ref 0–17)
NONHDLC SERPL-MCNC: 133 MG/DL
POTASSIUM SERPL-SCNC: 4.1 MMOL/L (ref 3.4–5.3)
PROT SERPL-MCNC: 7.4 G/DL (ref 6.8–8.8)
SODIUM SERPL-SCNC: 139 MMOL/L (ref 133–144)
TRIGL SERPL-MCNC: 218 MG/DL

## 2019-07-05 PROCEDURE — 80061 LIPID PANEL: CPT | Performed by: FAMILY MEDICINE

## 2019-07-05 PROCEDURE — 82043 UR ALBUMIN QUANTITATIVE: CPT | Performed by: FAMILY MEDICINE

## 2019-07-05 PROCEDURE — 36415 COLL VENOUS BLD VENIPUNCTURE: CPT | Performed by: FAMILY MEDICINE

## 2019-07-05 PROCEDURE — 83036 HEMOGLOBIN GLYCOSYLATED A1C: CPT | Performed by: FAMILY MEDICINE

## 2019-07-05 PROCEDURE — 80053 COMPREHEN METABOLIC PANEL: CPT | Performed by: FAMILY MEDICINE

## 2019-07-05 NOTE — TELEPHONE ENCOUNTER
Patient received last refill of the following Rx's and is requesting [facility] - patient has enough Rx of the following until September but patient wants refill on file - please sign if you wish:   dapagliflozin (FARXIGA) 10 MG TABS tablet  glipiZIDE (GLUCOTROL XL) 10 MG 24 hr tablet  metFORMIN (GLUCOPHAGE) 1000 MG tablet    Rx's sent to wrong mail order, provider to please also sign blood glucose supply prescriptions.     Routing refill request to provider for review/approval because:  Labs not current:  LDL - Simvastatin    JOSE Herndon, RN          Requested Prescriptions   Pending Prescriptions Disp Refills     dapagliflozin (FARXIGA) 10 MG TABS tablet 90 tablet 1     Sig: Take 1 tablet (10 mg) by mouth daily       Sodium Glucose Co-Transport Inhibitor Agents Failed - 7/5/2019  9:13 AM        Failed - Patient has documented LDL within the past 12 mos.     Recent Labs   Lab Test 01/29/18  0756   LDL 88             Failed - Patient has had a Microalbumin in the past 15 mos.     Recent Labs   Lab Test 01/29/18  0756   MICROL 11   UMALCR 9.65             Failed - Patient has documented A1c within the specified period of time.     If HgbA1C is 8 or greater, it needs to be on file within the past 3 months.  If less than 8, must be on file within the past 6 months.     Recent Labs   Lab Test 07/05/19  0851   A1C 7.9*             Passed - Blood pressure less than 140/90 in past 6 months     BP Readings from Last 3 Encounters:   06/13/19 128/86   08/13/18 128/82   02/01/18 126/80                 Passed - No creatinine >1.4 or GFR <45 within the past 12 mos     Recent Labs   Lab Test 07/06/18  0756   GFRESTIMATED >90   GFRESTBLACK >90       Recent Labs   Lab Test 07/06/18  0756   CR 0.79             Passed - Medication is active on med list        Passed - Patient is age 18 or older        Passed - Patient has documented normal Potassium within the last 12 mos.     Recent Labs   Lab Test  "07/06/18  0756   POTASSIUM 4.0             Passed - Recent (6 mo) or future (30 days) visit within the authorizing provider's specialty     Patient had office visit in the last 6 months or has a visit in the next 30 days with authorizing provider or within the authorizing provider's specialty.  See \"Patient Info\" tab in inbasket, or \"Choose Columns\" in Meds & Orders section of the refill encounter.            glipiZIDE (GLUCOTROL XL) 10 MG 24 hr tablet 180 tablet 1     Sig: Take 2 tablets (20 mg) by mouth daily       Sulfonylurea Agents Failed - 7/5/2019  9:13 AM        Failed - Patient has documented LDL within the past 12 mos.     Recent Labs   Lab Test 01/29/18  0756   LDL 88             Failed - Patient has had a Microalbumin in the past 15 mos.     Recent Labs   Lab Test 01/29/18  0756   MICROL 11   UMALCR 9.65             Failed - Patient has documented A1c within the specified period of time.     If HgbA1C is 8 or greater, it needs to be on file within the past 3 months.  If less than 8, must be on file within the past 6 months.     Recent Labs   Lab Test 07/05/19  0851   A1C 7.9*             Passed - Blood pressure less than 140/90 in past 6 months     BP Readings from Last 3 Encounters:   06/13/19 128/86   08/13/18 128/82   02/01/18 126/80                 Passed - Medication is active on med list        Passed - Patient is age 18 or older        Passed - Patient has a recent creatinine (normal) within the past 12 mos.     Recent Labs   Lab Test 07/06/18  0756   CR 0.79             Passed - Recent (6 mo) or future (30 days) visit within the authorizing provider's specialty     Patient had office visit in the last 6 months or has a visit in the next 30 days with authorizing provider or within the authorizing provider's specialty.  See \"Patient Info\" tab in inbasket, or \"Choose Columns\" in Meds & Orders section of the refill encounter.            blood glucose monitoring (ONE TOUCH ULTRASOFT) lancets 100 each " "3     Si each daily       Diabetic Supplies Protocol Passed - 2019  9:13 AM        Passed - Medication is active on med list        Passed - Patient is 18 years of age or older        Passed - Recent (6 mo) or future (30 days) visit within the authorizing provider's specialty     Patient had office visit in the last 6 months or has a visit in the next 30 days with authorizing provider.  See \"Patient Info\" tab in inbasket, or \"Choose Columns\" in Meds & Orders section of the refill encounter.            blood glucose (DEZ CONTOUR) test strip 100 each 3     Sig: TEST BLOOD SUGAR 1 TIMES A DAY OR AS DIRECTED       Diabetic Supplies Protocol Passed - 2019  9:13 AM        Passed - Medication is active on med list        Passed - Patient is 18 years of age or older        Passed - Recent (6 mo) or future (30 days) visit within the authorizing provider's specialty     Patient had office visit in the last 6 months or has a visit in the next 30 days with authorizing provider.  See \"Patient Info\" tab in inbasket, or \"Choose Columns\" in Meds & Orders section of the refill encounter.            metFORMIN (GLUCOPHAGE) 1000 MG tablet 180 tablet 1     Sig: Take 1 tablet (1,000 mg) by mouth 2 times daily (with meals)       Biguanide Agents Failed - 2019  9:13 AM        Failed - Patient has documented LDL within the past 12 mos.     Recent Labs   Lab Test 18  0756   LDL 88             Failed - Patient has had a Microalbumin in the past 15 mos.     Recent Labs   Lab Test 18  0756   MICROL 11   UMALCR 9.65             Failed - Patient has documented A1c within the specified period of time.     If HgbA1C is 8 or greater, it needs to be on file within the past 3 months.  If less than 8, must be on file within the past 6 months.     Recent Labs   Lab Test 19  0851   A1C 7.9*             Passed - Blood pressure less than 140/90 in past 6 months     BP Readings from Last 3 Encounters:   19 128/86 " "  08/13/18 128/82   02/01/18 126/80                 Passed - Patient is age 10 or older        Passed - Patient's CR is NOT>1.4 OR Patient's EGFR is NOT<45 within past 12 mos.     Recent Labs   Lab Test 07/06/18  0756   GFRESTIMATED >90   GFRESTBLACK >90       Recent Labs   Lab Test 07/06/18  0756   CR 0.79             Passed - Patient does NOT have a diagnosis of CHF.        Passed - Medication is active on med list        Passed - Recent (6 mo) or future (30 days) visit within the authorizing provider's specialty     Patient had office visit in the last 6 months or has a visit in the next 30 days with authorizing provider or within the authorizing provider's specialty.  See \"Patient Info\" tab in inbasket, or \"Choose Columns\" in Meds & Orders section of the refill encounter.            simvastatin (ZOCOR) 40 MG tablet 90 tablet 0     Sig: Take 1 tablet (40 mg) by mouth At Bedtime       Statins Protocol Failed - 7/5/2019  9:13 AM        Failed - LDL on file in past 12 months     Recent Labs   Lab Test 01/29/18  0756   LDL 88             Passed - No abnormal creatine kinase in past 12 months     No lab results found.             Passed - Recent (12 mo) or future (30 days) visit within the authorizing provider's specialty     Patient had office visit in the last 12 months or has a visit in the next 30 days with authorizing provider or within the authorizing provider's specialty.  See \"Patient Info\" tab in inbasket, or \"Choose Columns\" in Meds & Orders section of the refill encounter.              Passed - Medication is active on med list        Passed - Patient is age 18 or older          "

## 2019-07-05 NOTE — TELEPHONE ENCOUNTER
Patient in clinic for labs stated recent medications were sent to the wrong mail order.  Please re-send to [facility]  (mail order requires new prescriptions can not transfer)      Pending Prescriptions:                       Disp   Refills    dapagliflozin (FARXIGA) 10 MG TABS tablet 90 tab*1            Sig: Take 1 tablet (10 mg) by mouth daily    glipiZIDE (GLUCOTROL XL) 10 MG 24 hr tabl*180 ta*1            Sig: Take 2 tablets (20 mg) by mouth daily    blood glucose monitoring (ONE TOUCH ULTRA*100 ea*3            Si each daily    blood glucose (DEZ CONTOUR) test strip  100 ea*3            Sig: TEST BLOOD SUGAR 1 TIMES A DAY OR AS DIRECTED    metFORMIN (GLUCOPHAGE) 1000 MG tablet     180 ta*1            Sig: Take 1 tablet (1,000 mg) by mouth 2 times daily           (with meals)    simvastatin (ZOCOR) 40 MG tablet          90 tab*0            Sig: Take 1 tablet (40 mg) by mouth At Bedtime    **Needs Calibration solution, will not let me re-order**

## 2019-07-07 RX ORDER — LANCETS
1 EACH MISCELLANEOUS DAILY
Qty: 100 EACH | Refills: 3 | Status: SHIPPED | OUTPATIENT
Start: 2019-07-07 | End: 2020-03-26

## 2019-07-07 RX ORDER — SIMVASTATIN 40 MG
40 TABLET ORAL AT BEDTIME
Qty: 90 TABLET | Refills: 3 | Status: SHIPPED | OUTPATIENT
Start: 2019-07-07 | End: 2020-03-23

## 2019-07-07 RX ORDER — BLOOD-GLUCOSE CONTROL, NORMAL
EACH MISCELLANEOUS
Qty: 1 EACH | Refills: 1 | Status: SHIPPED | OUTPATIENT
Start: 2019-07-07 | End: 2020-03-26

## 2019-07-07 RX ORDER — DAPAGLIFLOZIN 10 MG/1
10 TABLET, FILM COATED ORAL DAILY
Qty: 90 TABLET | Refills: 1 | Status: SHIPPED | OUTPATIENT
Start: 2019-07-07 | End: 2020-02-06

## 2019-07-07 RX ORDER — GLIPIZIDE 10 MG/1
20 TABLET, FILM COATED, EXTENDED RELEASE ORAL DAILY
Qty: 180 TABLET | Refills: 1 | Status: SHIPPED | OUTPATIENT
Start: 2019-07-07 | End: 2020-02-06

## 2019-07-07 NOTE — RESULT ENCOUNTER NOTE
Your kidney and liver testing are both normal.   Your cholesterol is adequately controlled on current treatment.   The long term blood sugar testing is in the acceptable range.  Ideally the level would be below 7% but is currently below 8%.  Additional refills will be sent in for you.  Follow up in recommended in Jan-Feb.    Your urine testing is normal.  There is not elevated protein present.  Please call or MyChart message me if you have any questions.    FLYK

## 2019-07-09 ENCOUNTER — MYC MEDICAL ADVICE (OUTPATIENT)
Dept: FAMILY MEDICINE | Facility: CLINIC | Age: 44
End: 2019-07-09

## 2019-07-09 NOTE — TELEPHONE ENCOUNTER
Spoke with pharmacy and they need name of the medication. They don't have anything flagged generally it is flagged in the medications tab      Sent pt Trufat message    Maria Fernanda Burton MA

## 2019-08-19 ENCOUNTER — OFFICE VISIT (OUTPATIENT)
Dept: OPHTHALMOLOGY | Facility: CLINIC | Age: 44
End: 2019-08-19
Payer: COMMERCIAL

## 2019-08-19 DIAGNOSIS — H40.003 GLAUCOMA SUSPECT OF BOTH EYES: ICD-10-CM

## 2019-08-19 DIAGNOSIS — E11.65 TYPE 2 DIABETES MELLITUS WITH HYPERGLYCEMIA, WITH LONG-TERM CURRENT USE OF INSULIN (H): Primary | ICD-10-CM

## 2019-08-19 DIAGNOSIS — Z79.4 TYPE 2 DIABETES MELLITUS WITH HYPERGLYCEMIA, WITH LONG-TERM CURRENT USE OF INSULIN (H): Primary | ICD-10-CM

## 2019-08-19 PROCEDURE — 92014 COMPRE OPH EXAM EST PT 1/>: CPT | Performed by: OPHTHALMOLOGY

## 2019-08-19 PROCEDURE — 92133 CPTRZD OPH DX IMG PST SGM ON: CPT | Performed by: OPHTHALMOLOGY

## 2019-08-19 ASSESSMENT — VISUAL ACUITY
OD_SC: 20/25
OS_SC: 20/25
OS_SC: 20/15
METHOD: SNELLEN - LINEAR
OD_SC: 20/20

## 2019-08-19 ASSESSMENT — CUP TO DISC RATIO
OS_RATIO: 0.3
OD_RATIO: 0.3

## 2019-08-19 ASSESSMENT — EXTERNAL EXAM - LEFT EYE: OS_EXAM: NORMAL

## 2019-08-19 ASSESSMENT — REFRACTION_MANIFEST
OS_CYLINDER: SPHERE
OD_SPHERE: -0.75
OD_CYLINDER: +0.50
OD_AXIS: 005
OS_SPHERE: PLANO

## 2019-08-19 ASSESSMENT — SLIT LAMP EXAM - LIDS
COMMENTS: NORMAL
COMMENTS: NORMAL

## 2019-08-19 ASSESSMENT — TONOMETRY
OS_IOP_MMHG: 22
OD_IOP_MMHG: 23
IOP_METHOD: TONOPEN

## 2019-08-19 ASSESSMENT — EXTERNAL EXAM - RIGHT EYE: OD_EXAM: NORMAL

## 2019-08-19 NOTE — NURSING NOTE
Patient presents with:  Diabetic Eye Exam: Type II DM.  No VA changes.   AM, A1c 7.0 -7/5/19  Glaucoma Suspect Follow Up: Pt denies any gtts.       Referring Provider:  No referring provider defined for this encounter.        Eve Simpson COT

## 2019-08-20 NOTE — PROGRESS NOTES
Assessment & Plan   Jose Crews is a 44 year old male who presents with:   Review of systems for the eyes was negative other than the pertinent positives and negatives noted in the HPI.    Type 2 diabetes mellitus with hyperglycemia, with long-term current use of insulin (H)  - without retinopathy    Glaucoma suspect of both eyes  - OCT Optic Nerve RNFL Optovue OU (both eyes)      Return in 12 months for annual exam.      Attending Physician Attestation:  Complete documentation of historical and exam elements from today's encounter can be found in the full encounter summary report (not reduplicated in this progress note).  I personally obtained the chief complaint(s) and history of present illness.  I confirmed and edited as necessary the review of systems, past medical/surgical history, family history, social history, and examination findings as documented by others; and I examined the patient myself.  I personally reviewed the relevant tests, images, and reports as documented above.  I formulated and edited as necessary the assessment and plan and discussed the findings and management plan with the patient and family. - Jeff Adkins MD

## 2019-11-09 ENCOUNTER — HEALTH MAINTENANCE LETTER (OUTPATIENT)
Age: 44
End: 2019-11-09

## 2019-11-12 ENCOUNTER — OFFICE VISIT (OUTPATIENT)
Dept: DERMATOLOGY | Facility: CLINIC | Age: 44
End: 2019-11-12
Payer: COMMERCIAL

## 2019-11-12 DIAGNOSIS — D22.9 MULTIPLE BENIGN NEVI: ICD-10-CM

## 2019-11-12 DIAGNOSIS — L57.8 SUN-DAMAGED SKIN: ICD-10-CM

## 2019-11-12 DIAGNOSIS — L21.9 DERMATITIS, SEBORRHEIC: Primary | ICD-10-CM

## 2019-11-12 DIAGNOSIS — D18.01 CHERRY ANGIOMA: ICD-10-CM

## 2019-11-12 DIAGNOSIS — D23.9 DERMATOFIBROMA: ICD-10-CM

## 2019-11-12 DIAGNOSIS — L81.4 SOLAR LENTIGO: ICD-10-CM

## 2019-11-12 PROCEDURE — 99214 OFFICE O/P EST MOD 30 MIN: CPT | Performed by: DERMATOLOGY

## 2019-11-12 RX ORDER — KETOCONAZOLE 20 MG/G
CREAM TOPICAL 2 TIMES DAILY
Qty: 60 G | Refills: 0 | Status: CANCELLED | OUTPATIENT
Start: 2019-11-12

## 2019-11-12 RX ORDER — FLUOCINONIDE TOPICAL SOLUTION USP, 0.05% 0.5 MG/ML
SOLUTION TOPICAL
Qty: 60 ML | Refills: 11 | Status: SHIPPED | OUTPATIENT
Start: 2019-11-12 | End: 2020-04-09

## 2019-11-12 RX ORDER — KETOCONAZOLE 20 MG/ML
SHAMPOO TOPICAL
Qty: 120 ML | Refills: 11 | Status: SHIPPED | OUTPATIENT
Start: 2019-11-12 | End: 2020-04-09

## 2019-11-12 ASSESSMENT — PAIN SCALES - GENERAL: PAINLEVEL: NO PAIN (0)

## 2019-11-12 NOTE — LETTER
11/12/2019         RE: Jose Crews  [address]  [place] [address]        Dear Colleague,    Thank you for referring your patient, Jose Crews, to the [facility]. Please see a copy of my visit note below.    [facility] Dermatology Note    Dermatology Problem List:  1. Hx of DN  -Compound nevus with some features of dysplasia, back, s/p excision per Dr. Jesus's note on 3/8/2011  2. Seborrheic dermatitis  -current t/x: ketoconazole 2% shampoo alternating with OTC shampoo, fluocinide 0.05% solution  -s/p fluocinolone solution, desonide 0.05% cream    Encounter Date: Nov 12, 2019    CC:  Chief Complaint   Patient presents with     Skin Check     No new spots of concern     History of Present Illness:  Mr. Jose Crews is a 44 year old male who presents in self referral for a skin check. He has a history of DN (severity not graded) and history of unknown skin cancer in his grandmother. He was last seen on 7/10/18 by Dr. Cancino when all benign findings were noted. Today he has no new or changing lesions of concern on his skin. Denies any tender, nonhealing, bleeding skin lesions. He notes that he has mild dandruff. His scalp can get very itchy at times, especially when this condition is flared. He is currently treating with Head and shoulders shampoo but is hoping to get a refill of ketoconazole so he can get better control of this condition. No other concerns addressed today.      Past Medical History:   Patient Active Problem List   Diagnosis     Hyperlipidemia LDL goal <100     Obesity     Seborrhea     Elevated liver enzymes     History of dysplastic nevus     Type 2 diabetes mellitus with hyperglycemia (H)     Balanoposthitis     Phimosis     Type 2 diabetes mellitus with hyperglycemia, without long-term current use of insulin (H)     Gastroesophageal reflux disease without esophagitis     Past Medical History:   Diagnosis Date     Acne vulgaris       Balanitis 2/23/2009     History of atypical/dysplastic nevus 03/2010    back with positive margins.     MEDICAL HISTORY OF - age 12    hospitalized for a week with unexplained abdominal pain and vomiting     Type II or unspecified type diabetes mellitus without mention of complication, not stated as uncontrolled      Past Surgical History:   Procedure Laterality Date     HC TOOTH EXTRACTION W/FORCEP  2006       Social History:  Patient quit smoking 19 years ago. Works as a .     Family History:  History of unknown type of skin cancer in his grandmother.     Medications:  Current Outpatient Medications   Medication Sig Dispense Refill     ACE/ARB NOT PRESCRIBED, INTENTIONAL, by Other route continuous prn.       ARB NOT PRESCRIBED, INTENTIONAL, by Other route continuous prn.  0     aspirin 81 MG tablet Take 1 tablet by mouth daily. *       blood glucose (DEZ CONTOUR) test strip TEST BLOOD SUGAR 1 TIMES A DAY OR AS DIRECTED 100 each 3     blood glucose calibration (DEZ CONTOUR) NORMAL solution Use to calibrate blood glucose monitor as directed. 2 Bottle 11     blood glucose calibration (NO BRAND SPECIFIED) solution Use to calibrate blood glucose monitor as needed as directed. 1 each 1     blood glucose monitoring (ONE TOUCH ULTRASOFT) lancets 1 each daily 100 each 3     dapagliflozin (FARXIGA) 10 MG TABS tablet Take 1 tablet (10 mg) by mouth daily 90 tablet 1     glipiZIDE (GLUCOTROL XL) 10 MG 24 hr tablet Take 2 tablets (20 mg) by mouth daily 180 tablet 1     ketoconazole (NIZORAL) 2 % external cream Apply topically 2 times daily 60 g 0     ketoconazole (NIZORAL) 2 % shampoo Apply to the affected area and wash off after 5 minutes, use three times weekly 120 mL 11     metFORMIN (GLUCOPHAGE) 1000 MG tablet Take 1 tablet (1,000 mg) by mouth 2 times daily (with meals) 180 tablet 1     Multiple Vitamin (DAILY MULTIVITAMIN PO) Take 1 tablet by mouth daily.       simvastatin (ZOCOR) 40 MG tablet Take 1 tablet  (40 mg) by mouth At Bedtime 90 tablet 3       No Known Allergies    Review of Systems:  -Constitutional: Patient is otherwise feeling well, in usual state of health.   -Skin: As above in HPI. No additional skin concerns.    Physical exam:  Vitals: There were no vitals taken for this visit.  GEN: This is a well developed, well-nourished male in no acute distress, in a pleasant mood.    SKIN: Total skin excluding the undergarment areas was performed. The exam included the head/face, neck, both arms, chest, back, abdomen, both legs, digits and/or nails and buttocks.   - Tripathi Type II  - Scattered brown macules on sun exposed areas.  - Mild greasy scale throughout the scalp and on the glabella. No significant erythema   - There are dome shaped bright red papules on the trunk.   - Multiple regular brown pigmented macules and papules are identified on the body.   - Mostly on the abdomen and chest nevi are mostly congenital appearing and slightly larger in size.   - There is a firm tan/flesh colored papule that dimples with lateral pressure on the right dorsal foot, left calf.  - No other lesions of concern on areas examined.     Impression/Plan:    1. History of DN. No evidence of recurrence.     Recommend sunscreens SPF #30 or greater, protective clothing and avoidance of tanning beds.     Recommended yearly skin exams.    2. Sun damaged skin with solar lentigines    Recommend sunscreens SPF #30 or greater, protective clothing and avoidance of tanning beds.    3. Benign findings including: dermatofibroma, cherry angioma    No further intervention required. Patient to report changes.     Patient reassured of the benign nature of these lesions.    4. Multiple clinically benign nevi    No further intervention required. Patient to report changes.     Patient reassured of the benign nature of these lesions.    5. Seborrheic dermatitis and xerosis cutis. Discussed with the patient that the pathogenesis of the condition  is due to the skin reacting with the yeast that is present. Discussed that this is a chronic condition but responds well to treatment. The best line of treatment is with a shampoo and topical to target the yeast on the skin, topical steroid solution when the scalp is itchy.     Provided patient with the following instructions: Use ketoconazole 2% shampoo alternated with another over the counter anti-dandruff shampoo. When using the ketoconazole shampoo, lather onto your hair and leave for 3 minutes before rinsing.Then follow with your usual conditioner. Try to wash your hair every other day. Once you run out of one over the counter shampoo, switch the type of shampoo you are using to keep from building resistance.     Prescribed fluocinonide 0.05% solution to be used when the scalp is itchy.     I recommend a cream based moisturizer, like CeraVe or Vanicream.     Follow-up in 1 year for skin check, earlier for new or changing lesions.     Staff Involved:  Scribe/Staff    Scribe Disclosure  I, Babita Gallagher, am serving as a scribe to document services personally performed by Dr. Jenn Kiran MD, based on data collection and the provider's statements to me.     Provider Disclosure:   The documentation recorded by the scribe accurately reflects the services I personally performed and the decisions made by me.    Jenn Kiran MD    Department of Dermatology  [facility]: Phone: [phone], Fax:[phone]  [facility]: Phone: [phone], Fax: [phone]                Again, thank you for allowing me to participate in the care of your patient.        Sincerely,        Jenn Kiran MD

## 2019-11-12 NOTE — PATIENT INSTRUCTIONS
Use ketoconazole 2% shampoo alternated with another over the counter anti-dandruff shampoo. When using the ketoconazole shampoo, lather onto your hair and leave for 3 minutes before rinsing.Then follow with your usual conditioner. Try to wash your hair every other day. Once you run out of one over the counter shampoo, switch the type of shampoo you are using to keep from building resistance.     Everyday and especially when you are done showering, apply a moisturizer all over your body. I recommend a cream based moisturizer, like CeraVe or Vanicream.

## 2019-11-12 NOTE — PROGRESS NOTES
[facility] Dermatology Note    Dermatology Problem List:  1. Hx of DN  -Compound nevus with some features of dysplasia, back, s/p excision per Dr. Jesus's note on 3/8/2011  2. Seborrheic dermatitis  -current t/x: ketoconazole 2% shampoo alternating with OTC shampoo, fluocinide 0.05% solution  -s/p fluocinolone solution, desonide 0.05% cream    Encounter Date: Nov 12, 2019    CC:  Chief Complaint   Patient presents with     Skin Check     No new spots of concern     History of Present Illness:  Mr. Jose Crews is a 44 year old male who presents in self referral for a skin check. He has a history of DN (severity not graded) and history of unknown skin cancer in his grandmother. He was last seen on 7/10/18 by Dr. Cancino when all benign findings were noted. Today he has no new or changing lesions of concern on his skin. Denies any tender, nonhealing, bleeding skin lesions. He notes that he has mild dandruff. His scalp can get very itchy at times, especially when this condition is flared. He is currently treating with Head and shoulders shampoo but is hoping to get a refill of ketoconazole so he can get better control of this condition. No other concerns addressed today.      Past Medical History:   Patient Active Problem List   Diagnosis     Hyperlipidemia LDL goal <100     Obesity     Seborrhea     Elevated liver enzymes     History of dysplastic nevus     Type 2 diabetes mellitus with hyperglycemia (H)     Balanoposthitis     Phimosis     Type 2 diabetes mellitus with hyperglycemia, without long-term current use of insulin (H)     Gastroesophageal reflux disease without esophagitis     Past Medical History:   Diagnosis Date     Acne vulgaris      Balanitis 2/23/2009     History of atypical/dysplastic nevus 03/2010    back with positive margins.     MEDICAL HISTORY OF - age 12    hospitalized for a week with unexplained abdominal pain and vomiting     Type II or unspecified type diabetes mellitus  without mention of complication, not stated as uncontrolled      Past Surgical History:   Procedure Laterality Date     HC TOOTH EXTRACTION W/FORCEP  2006       Social History:  Patient quit smoking 19 years ago. Works as a .     Family History:  History of unknown type of skin cancer in his grandmother.     Medications:  Current Outpatient Medications   Medication Sig Dispense Refill     ACE/ARB NOT PRESCRIBED, INTENTIONAL, by Other route continuous prn.       ARB NOT PRESCRIBED, INTENTIONAL, by Other route continuous prn.  0     aspirin 81 MG tablet Take 1 tablet by mouth daily. *       blood glucose (DEZ CONTOUR) test strip TEST BLOOD SUGAR 1 TIMES A DAY OR AS DIRECTED 100 each 3     blood glucose calibration (DEZ CONTOUR) NORMAL solution Use to calibrate blood glucose monitor as directed. 2 Bottle 11     blood glucose calibration (NO BRAND SPECIFIED) solution Use to calibrate blood glucose monitor as needed as directed. 1 each 1     blood glucose monitoring (ONE TOUCH ULTRASOFT) lancets 1 each daily 100 each 3     dapagliflozin (FARXIGA) 10 MG TABS tablet Take 1 tablet (10 mg) by mouth daily 90 tablet 1     glipiZIDE (GLUCOTROL XL) 10 MG 24 hr tablet Take 2 tablets (20 mg) by mouth daily 180 tablet 1     ketoconazole (NIZORAL) 2 % external cream Apply topically 2 times daily 60 g 0     ketoconazole (NIZORAL) 2 % shampoo Apply to the affected area and wash off after 5 minutes, use three times weekly 120 mL 11     metFORMIN (GLUCOPHAGE) 1000 MG tablet Take 1 tablet (1,000 mg) by mouth 2 times daily (with meals) 180 tablet 1     Multiple Vitamin (DAILY MULTIVITAMIN PO) Take 1 tablet by mouth daily.       simvastatin (ZOCOR) 40 MG tablet Take 1 tablet (40 mg) by mouth At Bedtime 90 tablet 3       No Known Allergies    Review of Systems:  -Constitutional: Patient is otherwise feeling well, in usual state of health.   -Skin: As above in HPI. No additional skin concerns.    Physical exam:  Vitals: There  were no vitals taken for this visit.  GEN: This is a well developed, well-nourished male in no acute distress, in a pleasant mood.    SKIN: Total skin excluding the undergarment areas was performed. The exam included the head/face, neck, both arms, chest, back, abdomen, both legs, digits and/or nails and buttocks.   - Tripathi Type II  - Scattered brown macules on sun exposed areas.  - Mild greasy scale throughout the scalp and on the glabella. No significant erythema   - There are dome shaped bright red papules on the trunk.   - Multiple regular brown pigmented macules and papules are identified on the body.   - Mostly on the abdomen and chest nevi are mostly congenital appearing and slightly larger in size.   - There is a firm tan/flesh colored papule that dimples with lateral pressure on the right dorsal foot, left calf.  - No other lesions of concern on areas examined.     Impression/Plan:    1. History of DN. No evidence of recurrence.     Recommend sunscreens SPF #30 or greater, protective clothing and avoidance of tanning beds.     Recommended yearly skin exams.    2. Sun damaged skin with solar lentigines    Recommend sunscreens SPF #30 or greater, protective clothing and avoidance of tanning beds.    3. Benign findings including: dermatofibroma, cherry angioma    No further intervention required. Patient to report changes.     Patient reassured of the benign nature of these lesions.    4. Multiple clinically benign nevi    No further intervention required. Patient to report changes.     Patient reassured of the benign nature of these lesions.    5. Seborrheic dermatitis and xerosis cutis. Discussed with the patient that the pathogenesis of the condition is due to the skin reacting with the yeast that is present. Discussed that this is a chronic condition but responds well to treatment. The best line of treatment is with a shampoo and topical to target the yeast on the skin, topical steroid solution when  the scalp is itchy.     Provided patient with the following instructions: Use ketoconazole 2% shampoo alternated with another over the counter anti-dandruff shampoo. When using the ketoconazole shampoo, lather onto your hair and leave for 3 minutes before rinsing.Then follow with your usual conditioner. Try to wash your hair every other day. Once you run out of one over the counter shampoo, switch the type of shampoo you are using to keep from building resistance.     Prescribed fluocinonide 0.05% solution to be used when the scalp is itchy.     I recommend a cream based moisturizer, like CeraVe or Vanicream.     Follow-up in 1 year for skin check, earlier for new or changing lesions.     Staff Involved:  Scribe/Staff    Scribe Disclosure  I, Babita Gallagher, am serving as a scribe to document services personally performed by Dr. Jenn Kiran MD, based on data collection and the provider's statements to me.     Provider Disclosure:   The documentation recorded by the scribe accurately reflects the services I personally performed and the decisions made by me.    Jenn Kiran MD    Department of Dermatology  [facility]: Phone: [phone], Fax:[phone]  [facility]: Phone: [phone], Fax: [phone]

## 2019-11-12 NOTE — NURSING NOTE
Jose Crews's goals for this visit include:   Chief Complaint   Patient presents with     Skin Check     No new spots of concern       He requests these members of his care team be copied on today's visit information:     PCP: Steffanie Villa    Referring Provider:  No referring provider defined for this encounter.    There were no vitals taken for this visit.    Do you need any medication refills at today's visit? No

## 2020-02-02 DIAGNOSIS — E11.65 TYPE 2 DIABETES MELLITUS WITH HYPERGLYCEMIA, WITHOUT LONG-TERM CURRENT USE OF INSULIN (H): ICD-10-CM

## 2020-02-02 NOTE — LETTER
[address]  [address]  [phone]    February 6, 2020      Jose Crews  [address] [place] [address]      Dear Jose,    We have refilled your medication for one month.   We will need to see you for an office visit and fasting labs before any additional refills can be given.  Please call [phone] to schedule this appointment.      Thank you,    [facility]

## 2020-02-03 DIAGNOSIS — E11.65 TYPE 2 DIABETES MELLITUS WITH HYPERGLYCEMIA, WITHOUT LONG-TERM CURRENT USE OF INSULIN (H): ICD-10-CM

## 2020-02-03 NOTE — TELEPHONE ENCOUNTER
"Requested Prescriptions   Pending Prescriptions Disp Refills     glipiZIDE (GLUCOTROL XL) 10 MG 24 hr tablet [Pharmacy Med Name: GLIPIZIDE ER TABS 10MG] 180 tablet 4     Sig: TAKE 2 TABLETS DAILY       Sulfonylurea Agents Failed - 2/2/2020 11:49 PM        Failed - Blood pressure less than 140/90 in past 6 months     BP Readings from Last 3 Encounters:   06/13/19 128/86   08/13/18 128/82   02/01/18 126/80                 Failed - Patient has documented A1c within the specified period of time.     If HgbA1C is 8 or greater, it needs to be on file within the past 3 months.  If less than 8, must be on file within the past 6 months.     Recent Labs   Lab Test 07/05/19  0851   A1C 7.9*             Failed - Recent (6 mo) or future (30 days) visit within the authorizing provider's specialty     Patient had office visit in the last 6 months or has a visit in the next 30 days with authorizing provider or within the authorizing provider's specialty.  See \"Patient Info\" tab in inbasket, or \"Choose Columns\" in Meds & Orders section of the refill encounter.            Passed - Patient has documented LDL within the past 12 mos.     Recent Labs   Lab Test 07/05/19  0851   LDL 89             Passed - Patient has had a Microalbumin in the past 15 mos.     Recent Labs   Lab Test 07/05/19  0850   MICROL 11   UMALCR 9.15             Passed - Medication is active on med list        Passed - Patient is age 18 or older        Passed - Patient has a recent creatinine (normal) within the past 12 mos.     Recent Labs   Lab Test 07/05/19  0851   CR 0.82             metFORMIN (GLUCOPHAGE) 1000 MG tablet [Pharmacy Med Name: METFORMIN HCL TABS 1000MG] 180 tablet 4     Sig: TAKE 1 TABLET TWICE A DAY WITH MEALS       Biguanide Agents Failed - 2/2/2020 11:49 PM        Failed - Blood pressure less than 140/90 in past 6 months     BP Readings from Last 3 Encounters:   06/13/19 128/86   08/13/18 128/82   02/01/18 126/80                 Failed - Patient " "has documented A1c within the specified period of time.     If HgbA1C is 8 or greater, it needs to be on file within the past 3 months.  If less than 8, must be on file within the past 6 months.     Recent Labs   Lab Test 07/05/19  0851   A1C 7.9*             Failed - Recent (6 mo) or future (30 days) visit within the authorizing provider's specialty     Patient had office visit in the last 6 months or has a visit in the next 30 days with authorizing provider or within the authorizing provider's specialty.  See \"Patient Info\" tab in inbasket, or \"Choose Columns\" in Meds & Orders section of the refill encounter.            Passed - Patient has documented LDL within the past 12 mos.     Recent Labs   Lab Test 07/05/19  0851   LDL 89             Passed - Patient has had a Microalbumin in the past 15 mos.     Recent Labs   Lab Test 07/05/19  0850   MICROL 11   UMALCR 9.15             Passed - Patient is age 10 or older        Passed - Patient's CR is NOT>1.4 OR Patient's EGFR is NOT<45 within past 12 mos.     Recent Labs   Lab Test 07/05/19  0851   GFRESTIMATED >90   GFRESTBLACK >90       Recent Labs   Lab Test 07/05/19  0851   CR 0.82             Passed - Patient does NOT have a diagnosis of CHF.        Passed - Medication is active on med list        glipiZIDE (GLUCOTROL XL) 10 MG 24 hr tablet   Last Written Prescription Date:  7/7/19  Last Fill Quantity: 180,  # refills: 1   Last office visit: 6/13/2019 with prescribing provider:  Dr. Villa   Future Office Visit:      metFORMIN (GLUCOPHAGE) 1000 MG tablet  Last Written Prescription Date:  7/7/19  Last Fill Quantity: 180,  # refills: 1   Last office visit: 6/13/2019 with prescribing provider:  Dr. Villa   Future Office Visit:      "

## 2020-02-04 NOTE — TELEPHONE ENCOUNTER
"Requested Prescriptions   Pending Prescriptions Disp Refills     FARXIGA 10 MG TABS tablet [Pharmacy Med Name: FARXIGA TABS 10MG]  Last Written Prescription Date:  7/7/19  Last Fill Quantity: 90 tablet,  # refills: 1   Last office visit: 6/13/2019 with prescribing provider:  Dr. Villa   Future Office Visit:     90 tablet 4     Sig: TAKE 1 TABLET DAILY       Sodium Glucose Co-Transport Inhibitor Agents Failed - 2/3/2020 11:50 PM        Failed - Blood pressure less than 140/90 in past 6 months     BP Readings from Last 3 Encounters:   06/13/19 128/86   08/13/18 128/82   02/01/18 126/80                 Failed - Patient has documented A1c within the specified period of time.     If HgbA1C is 8 or greater, it needs to be on file within the past 3 months.  If less than 8, must be on file within the past 6 months.     Recent Labs   Lab Test 07/05/19  0851   A1C 7.9*             Failed - Recent (6 mo) or future (30 days) visit within the authorizing provider's specialty     Patient had office visit in the last 6 months or has a visit in the next 30 days with authorizing provider or within the authorizing provider's specialty.  See \"Patient Info\" tab in inbasket, or \"Choose Columns\" in Meds & Orders section of the refill encounter.            Passed - Patient has documented LDL within the past 12 mos.     Recent Labs   Lab Test 07/05/19  0851   LDL 89             Passed - Patient has had a Microalbumin in the past 15 mos.     Recent Labs   Lab Test 07/05/19  0850   MICROL 11   UMALCR 9.15             Passed - No creatinine >1.4 or GFR <45 within the past 12 mos     Recent Labs   Lab Test 07/05/19  0851   GFRESTIMATED >90   GFRESTBLACK >90       Recent Labs   Lab Test 07/05/19  0851   CR 0.82             Passed - Medication is active on med list        Passed - Patient is age 18 or older        Passed - Patient has documented normal Potassium within the last 12 mos.     Recent Labs   Lab Test 07/05/19  0851   POTASSIUM 4.1 "

## 2020-02-05 NOTE — TELEPHONE ENCOUNTER
Routing refill request to provider for review/approval because:  Labs not current:  A1c 7.9 on 07/05/2019  BP has not been perform in over 6 months.     Merissa Rogel RN  [place]

## 2020-02-06 RX ORDER — GLIPIZIDE 10 MG/1
20 TABLET, FILM COATED, EXTENDED RELEASE ORAL DAILY
Qty: 180 TABLET | Refills: 0 | Status: SHIPPED | OUTPATIENT
Start: 2020-02-06 | End: 2020-03-26

## 2020-02-06 RX ORDER — DAPAGLIFLOZIN 10 MG/1
10 TABLET, FILM COATED ORAL DAILY
Qty: 90 TABLET | Refills: 0 | Status: SHIPPED | OUTPATIENT
Start: 2020-02-06 | End: 2020-03-26

## 2020-02-06 NOTE — TELEPHONE ENCOUNTER
Routing refill request to provider for review/approval because:  Labs not current:  A1C  Patient needs to be seen because:  Over due for diabetic recheck      Valeri Barnes RN, BSN, PHN

## 2020-02-06 NOTE — TELEPHONE ENCOUNTER
mychart message sent last month for follow up - not read - please call patient re:  Refill sent but follow up appointment needed this month.    FLYK

## 2020-02-23 ENCOUNTER — HEALTH MAINTENANCE LETTER (OUTPATIENT)
Age: 45
End: 2020-02-23

## 2020-03-05 ENCOUNTER — DOCUMENTATION ONLY (OUTPATIENT)
Dept: LAB | Facility: CLINIC | Age: 45
End: 2020-03-05

## 2020-03-05 DIAGNOSIS — R74.8 ELEVATED LIVER ENZYMES: ICD-10-CM

## 2020-03-05 DIAGNOSIS — E78.5 HYPERLIPIDEMIA LDL GOAL <100: ICD-10-CM

## 2020-03-05 DIAGNOSIS — E11.65 TYPE 2 DIABETES MELLITUS WITH HYPERGLYCEMIA, WITHOUT LONG-TERM CURRENT USE OF INSULIN (H): Primary | ICD-10-CM

## 2020-03-05 NOTE — PROGRESS NOTES
Pt has a lab appointment on 03.09.2020, appointment notes states that the patient would like an A1C, fasting glucose, and a liver and kidney panel, please review their chart and add orders if necessary.    Thank you,    [facility]

## 2020-03-09 DIAGNOSIS — E78.5 HYPERLIPIDEMIA LDL GOAL <100: ICD-10-CM

## 2020-03-09 DIAGNOSIS — E11.65 TYPE 2 DIABETES MELLITUS WITH HYPERGLYCEMIA, WITHOUT LONG-TERM CURRENT USE OF INSULIN (H): ICD-10-CM

## 2020-03-09 DIAGNOSIS — R74.8 ELEVATED LIVER ENZYMES: ICD-10-CM

## 2020-03-09 LAB
ALBUMIN SERPL-MCNC: 4.2 G/DL (ref 3.4–5)
ALP SERPL-CCNC: 88 U/L (ref 40–150)
ALT SERPL W P-5'-P-CCNC: 68 U/L (ref 0–70)
ANION GAP SERPL CALCULATED.3IONS-SCNC: 11 MMOL/L (ref 3–14)
AST SERPL W P-5'-P-CCNC: 26 U/L (ref 0–45)
BILIRUB SERPL-MCNC: 0.8 MG/DL (ref 0.2–1.3)
BUN SERPL-MCNC: 11 MG/DL (ref 7–30)
CALCIUM SERPL-MCNC: 8.7 MG/DL (ref 8.5–10.1)
CHLORIDE SERPL-SCNC: 108 MMOL/L (ref 94–109)
CHOLEST SERPL-MCNC: 172 MG/DL
CO2 SERPL-SCNC: 20 MMOL/L (ref 20–32)
CREAT SERPL-MCNC: 0.85 MG/DL (ref 0.66–1.25)
ERYTHROCYTE [DISTWIDTH] IN BLOOD BY AUTOMATED COUNT: 12.5 % (ref 10–15)
GFR SERPL CREATININE-BSD FRML MDRD: >90 ML/MIN/{1.73_M2}
GLUCOSE SERPL-MCNC: 171 MG/DL (ref 70–99)
HBA1C MFR BLD: 8.8 % (ref 0–5.6)
HCT VFR BLD AUTO: 45.5 % (ref 40–53)
HDLC SERPL-MCNC: 32 MG/DL
HGB BLD-MCNC: 15.2 G/DL (ref 13.3–17.7)
LDLC SERPL CALC-MCNC: 94 MG/DL
MCH RBC QN AUTO: 31.3 PG (ref 26.5–33)
MCHC RBC AUTO-ENTMCNC: 33.4 G/DL (ref 31.5–36.5)
MCV RBC AUTO: 94 FL (ref 78–100)
NONHDLC SERPL-MCNC: 140 MG/DL
PLATELET # BLD AUTO: 191 10E9/L (ref 150–450)
POTASSIUM SERPL-SCNC: 3.9 MMOL/L (ref 3.4–5.3)
PROT SERPL-MCNC: 7.2 G/DL (ref 6.8–8.8)
RBC # BLD AUTO: 4.85 10E12/L (ref 4.4–5.9)
SODIUM SERPL-SCNC: 139 MMOL/L (ref 133–144)
TRIGL SERPL-MCNC: 231 MG/DL
TSH SERPL DL<=0.005 MIU/L-ACNC: 1.93 MU/L (ref 0.4–4)
WBC # BLD AUTO: 8.3 10E9/L (ref 4–11)

## 2020-03-09 PROCEDURE — 36415 COLL VENOUS BLD VENIPUNCTURE: CPT | Performed by: FAMILY MEDICINE

## 2020-03-09 PROCEDURE — 84443 ASSAY THYROID STIM HORMONE: CPT | Performed by: FAMILY MEDICINE

## 2020-03-09 PROCEDURE — 85027 COMPLETE CBC AUTOMATED: CPT | Performed by: FAMILY MEDICINE

## 2020-03-09 PROCEDURE — 80053 COMPREHEN METABOLIC PANEL: CPT | Performed by: FAMILY MEDICINE

## 2020-03-09 PROCEDURE — 80061 LIPID PANEL: CPT | Performed by: FAMILY MEDICINE

## 2020-03-09 PROCEDURE — 83036 HEMOGLOBIN GLYCOSYLATED A1C: CPT | Performed by: FAMILY MEDICINE

## 2020-03-11 NOTE — RESULT ENCOUNTER NOTE
Your kidney and liver testing are normal.  The long term blood sugar testing is higher than previous.  We can discuss this at your follow up appointment.  Your thyroid testing is normal.  Your cholesterol is well controlled.  Your blood cell counts are normal.  Please call or MyChart message me if you have any questions.    FLYK

## 2020-03-22 DIAGNOSIS — E78.5 HYPERLIPIDEMIA LDL GOAL <100: ICD-10-CM

## 2020-03-23 RX ORDER — SIMVASTATIN 40 MG
TABLET ORAL
Qty: 90 TABLET | Refills: 0 | Status: SHIPPED | OUTPATIENT
Start: 2020-03-23 | End: 2020-03-26

## 2020-03-23 NOTE — TELEPHONE ENCOUNTER
Different mail order pharmacy being requested for refill than previously sent to.   Prescription approved per [facility] Ref[facility].      Valeri Barnes RN, BSN, PHN

## 2020-03-23 NOTE — TELEPHONE ENCOUNTER
"Requested Prescriptions   Pending Prescriptions Disp Refills     simvastatin (ZOCOR) 40 MG tablet [Pharmacy Med Name: SIMVASTATIN  40MG  TAB] 90 tablet 3     Sig: TAKE 1 TABLET BY MOUTH AT  BEDTIME       Statins Protocol Passed - 3/22/2020  5:29 AM        Passed - LDL on file in past 12 months     Recent Labs   Lab Test 03/09/20  0739   LDL 94             Passed - No abnormal creatine kinase in past 12 months     No lab results found.             Passed - Recent (12 mo) or future (30 days) visit within the authorizing provider's specialty     Patient has had an office visit with the authorizing provider or a provider within the authorizing providers department within the previous 12 mos or has a future within next 30 days. See \"Patient Info\" tab in inbasket, or \"Choose Columns\" in Meds & Orders section of the refill encounter.              Passed - Medication is active on med list        Passed - Patient is age 18 or older           simvastatin (ZOCOR) 40 MG tablet  Last Written Prescription Date:  7/7/19  Last Fill Quantity: 90,  # refills: 3   Last office visit: 6/13/2019 with prescribing provider:  Dr. Villa   Future Office Visit:   Next 5 appointments (look out 90 days)    Mar 26, 2020  7:00 AM CDT  [facility]phone [facility] [address] [place] [id number] [phone]           "

## 2020-03-26 ENCOUNTER — VIRTUAL VISIT (OUTPATIENT)
Dept: FAMILY MEDICINE | Facility: CLINIC | Age: 45
End: 2020-03-26
Payer: COMMERCIAL

## 2020-03-26 DIAGNOSIS — Z79.4 TYPE 2 DIABETES MELLITUS WITH HYPERGLYCEMIA, WITH LONG-TERM CURRENT USE OF INSULIN (H): Primary | ICD-10-CM

## 2020-03-26 DIAGNOSIS — E11.65 TYPE 2 DIABETES MELLITUS WITH HYPERGLYCEMIA, WITHOUT LONG-TERM CURRENT USE OF INSULIN (H): ICD-10-CM

## 2020-03-26 DIAGNOSIS — E11.65 TYPE 2 DIABETES MELLITUS WITH HYPERGLYCEMIA, WITH LONG-TERM CURRENT USE OF INSULIN (H): Primary | ICD-10-CM

## 2020-03-26 DIAGNOSIS — E78.5 HYPERLIPIDEMIA LDL GOAL <100: ICD-10-CM

## 2020-03-26 PROCEDURE — 99443 ZZC PHYSICIAN TELEPHONE EVALUATION 21-30 MIN: CPT | Mod: TEL | Performed by: FAMILY MEDICINE

## 2020-03-26 RX ORDER — GLIPIZIDE 10 MG/1
20 TABLET, FILM COATED, EXTENDED RELEASE ORAL DAILY
Qty: 180 TABLET | Refills: 1 | Status: SHIPPED | OUTPATIENT
Start: 2020-03-26 | End: 2020-04-09

## 2020-03-26 RX ORDER — SIMVASTATIN 40 MG
40 TABLET ORAL AT BEDTIME
Qty: 90 TABLET | Refills: 1 | Status: SHIPPED | OUTPATIENT
Start: 2020-03-26 | End: 2020-04-09

## 2020-03-26 RX ORDER — GLUCOSAMINE HCL/CHONDROITIN SU 500-400 MG
CAPSULE ORAL
Qty: 100 EACH | Refills: 3 | Status: SHIPPED | OUTPATIENT
Start: 2020-03-26 | End: 2020-04-09

## 2020-03-26 RX ORDER — DAPAGLIFLOZIN 10 MG/1
10 TABLET, FILM COATED ORAL DAILY
Qty: 90 TABLET | Refills: 1 | Status: SHIPPED | OUTPATIENT
Start: 2020-03-26 | End: 2020-04-09

## 2020-03-26 RX ORDER — LANCETS
EACH MISCELLANEOUS
Qty: 100 EACH | Refills: 6 | Status: SHIPPED | OUTPATIENT
Start: 2020-03-26 | End: 2020-04-09

## 2020-03-26 NOTE — PATIENT INSTRUCTIONS
Script for machine and supplies sent to pharmacy.  Monitor the blood sugars and notify me if continues to be over 180-200 with exercise and dietary changes.

## 2020-03-26 NOTE — PROGRESS NOTES
"Jose Crews is a 44 year old male who is being evaluated via a billable telephone visit.      The patient has been notified of following:     \"This telephone visit will be conducted via a call between you and your physician/provider. We have found that certain health care needs can be provided without the need for a physical exam.  This service lets us provide the care you need with a short phone conversation.  If a prescription is necessary we can send it directly to your pharmacy.  If lab work is needed we can place an order for that and you can then stop by our lab to have the test done at a later time.    If during the course of the call the physician/provider feels a telephone visit is not appropriate, you will not be charged for this service.\"     Jose Crews complains of   Chief Complaint   Patient presents with     Recheck Medication     Results       I have reviewed and updated the patient's Past Medical History, Social History, Family History and Medication List.    ALLERGIES  Patient has no known allergies.      Diabetes Follow-up  Thinks higher blood sugars are related to 3 factors - working crazy hours,  Sedentary, not as diligent about dietary measures.     How often are you checking your blood sugar? Not at all    What concerns do you have today about your diabetes? None     Do you have any of these symptoms? (Select all that apply)  No numbness or tingling in feet.  No redness, sores or blisters on feet.  No complaints of excessive thirst.  No reports of blurry vision.  No significant changes to weight.    Side effect - yellow discharge on penis - no itching,  Armpits mild rash, - raised rash, no scaling.   Using cortisone cream - occasionally.  Switching deodorants - may be reacting.            Hyperlipidemia Follow-Up      Are you regularly taking any medication or supplement to lower your cholesterol?   Yes- simvastatin    Are you having muscle aches or other side effects that you think could " "be caused by your cholesterol lowering medication?  No        BP Readings from Last 2 Encounters:   06/13/19 128/86   08/13/18 128/82     Hemoglobin A1C (%)   Date Value   03/09/2020 8.8 (H)   07/05/2019 7.9 (H)     LDL Cholesterol Calculated (mg/dL)   Date Value   03/09/2020 94   07/05/2019 89       Working from home - \"stay at home\"  Discussed coronavirus and risk related to chronic condition of diabetes mellitus.  Limit exposure is primary recommendation.          How many servings of fruits and vegetables do you eat daily?  2-3    How many days per week do you exercise enough to make your heart beat faster? 3 or less    How many minutes a day do you exercise enough to make your heart beat faster? 9 or less    How many days per week do you miss taking your medication? 0         Assessment/Plan:  1. Type 2 diabetes mellitus with hyperglycemia, with long-term current use of insulin (H)/ 2. Type 2 diabetes mellitus with hyperglycemia, without long-term current use of insulin (H)  Monitor blood sugar at home.  Will follow-up if blood sugars remain elevated in spite of adjusting diet and exercise.  Continue current medication unchanged at this time.  Understands the A1c elevation at 8.8% puts him at greater risk for complications from the diabetes and he is planning more attention to the other factors that he attributes the elevation to.  Has not required blood pressure medication based on previous levels.  He has a blood pressure cuff at home with which he will monitor his blood pressure and notify us if elevations are noted.  - blood glucose monitoring (NO BRAND SPECIFIED) meter device kit; Use to test blood sugar 1 times daily or as directed. Preferred blood glucose meter OR supplies to accompany: Blood Glucose Monitor Brands: per insurance.  Dispense: 1 kit; Refill: 0  - blood glucose (NO BRAND SPECIFIED) test strip; Use to test blood sugar 1 times daily or as directed. To accompany: Blood Glucose Monitor Brands: " per insurance.  Dispense: 100 strip; Refill: 6  - blood glucose calibration (NO BRAND SPECIFIED) solution; To accompany: Blood Glucose Monitor Brands: per insurance.  Dispense: 1 Bottle; Refill: 3  - thin (NO BRAND SPECIFIED) lancets; Use with lanceting device. To accompany: Blood Glucose Monitor Brands: per insurance.  Dispense: 100 each; Refill: 6  - alcohol swab prep pads; Use to swab area of injection/ella as directed.  Dispense: 100 each; Refill: 3  - glipiZIDE (GLUCOTROL XL) 10 MG 24 hr tablet; Take 2 tablets (20 mg) by mouth daily DO NOT CRUSH. Take before or with meals.  Dispense: 180 tablet; Refill: 1  - metFORMIN (GLUCOPHAGE) 1000 MG tablet; Take 1 tablet (1,000 mg) by mouth 2 times daily (with meals)  Dispense: 180 tablet; Refill: 1  - dapagliflozin (FARXIGA) 10 MG TABS tablet; Take 1 tablet (10 mg) by mouth daily  Dispense: 90 tablet; Refill: 1    3. Hyperlipidemia LDL goal <100  Refill medication as noted.  Follow up labs in 4-6 months.  - simvastatin (ZOCOR) 40 MG tablet; Take 1 tablet (40 mg) by mouth At Bedtime  Dispense: 90 tablet; Refill: 1    Phone call duration:  24 minutes    Steffanie Villa MD

## 2020-04-09 ENCOUNTER — MYC REFILL (OUTPATIENT)
Dept: DERMATOLOGY | Facility: CLINIC | Age: 45
End: 2020-04-09

## 2020-04-09 ENCOUNTER — MYC REFILL (OUTPATIENT)
Dept: FAMILY MEDICINE | Facility: CLINIC | Age: 45
End: 2020-04-09

## 2020-04-09 ENCOUNTER — MYC MEDICAL ADVICE (OUTPATIENT)
Dept: FAMILY MEDICINE | Facility: CLINIC | Age: 45
End: 2020-04-09

## 2020-04-09 DIAGNOSIS — L21.9 DERMATITIS, SEBORRHEIC: ICD-10-CM

## 2020-04-09 DIAGNOSIS — B37.2 YEAST INFECTION OF THE SKIN: ICD-10-CM

## 2020-04-09 DIAGNOSIS — Z79.4 TYPE 2 DIABETES MELLITUS WITH HYPERGLYCEMIA, WITH LONG-TERM CURRENT USE OF INSULIN (H): ICD-10-CM

## 2020-04-09 DIAGNOSIS — E11.65 TYPE 2 DIABETES MELLITUS WITH HYPERGLYCEMIA, WITH LONG-TERM CURRENT USE OF INSULIN (H): ICD-10-CM

## 2020-04-09 DIAGNOSIS — E78.5 HYPERLIPIDEMIA LDL GOAL <100: ICD-10-CM

## 2020-04-09 DIAGNOSIS — E11.65 TYPE 2 DIABETES MELLITUS WITH HYPERGLYCEMIA, WITHOUT LONG-TERM CURRENT USE OF INSULIN (H): ICD-10-CM

## 2020-04-09 RX ORDER — SIMVASTATIN 40 MG
40 TABLET ORAL AT BEDTIME
Qty: 90 TABLET | Refills: 1 | Status: SHIPPED | OUTPATIENT
Start: 2020-04-09 | End: 2020-10-20

## 2020-04-09 RX ORDER — GLIPIZIDE 10 MG/1
20 TABLET, FILM COATED, EXTENDED RELEASE ORAL DAILY
Qty: 180 TABLET | Refills: 1 | Status: SHIPPED | OUTPATIENT
Start: 2020-04-09 | End: 2020-08-11

## 2020-04-09 RX ORDER — KETOCONAZOLE 20 MG/G
CREAM TOPICAL 2 TIMES DAILY
Qty: 60 G | Refills: 0 | Status: SHIPPED | OUTPATIENT
Start: 2020-04-09

## 2020-04-09 RX ORDER — DAPAGLIFLOZIN 10 MG/1
10 TABLET, FILM COATED ORAL DAILY
Qty: 90 TABLET | Refills: 1 | Status: SHIPPED | OUTPATIENT
Start: 2020-04-09 | End: 2020-04-13

## 2020-04-09 RX ORDER — FLUOCINONIDE TOPICAL SOLUTION USP, 0.05% 0.5 MG/ML
SOLUTION TOPICAL
Qty: 60 ML | Refills: 7 | Status: SHIPPED | OUTPATIENT
Start: 2020-04-09

## 2020-04-09 RX ORDER — KETOCONAZOLE 20 MG/ML
SHAMPOO TOPICAL
Qty: 120 ML | Refills: 7 | Status: SHIPPED | OUTPATIENT
Start: 2020-04-09 | End: 2022-09-01

## 2020-04-09 RX ORDER — LANCETS
EACH MISCELLANEOUS
Qty: 100 EACH | Refills: 6 | Status: SHIPPED | OUTPATIENT
Start: 2020-04-09 | End: 2021-06-10

## 2020-04-09 RX ORDER — GLUCOSAMINE HCL/CHONDROITIN SU 500-400 MG
CAPSULE ORAL
Qty: 100 EACH | Refills: 3 | Status: SHIPPED | OUTPATIENT
Start: 2020-04-09

## 2020-04-09 NOTE — TELEPHONE ENCOUNTER
"Requested Prescriptions   Pending Prescriptions Disp Refills     ketoconazole (NIZORAL) 2 % external cream 60 g 0     Sig: Apply topically 2 times daily       Antifungal Agents Passed - 4/9/2020  9:16 AM        Passed - Recent (12 mo) or future (30 days) visit within the authorizing provider's specialty     Patient has had an office visit with the authorizing provider or a provider within the authorizing providers department within the previous 12 mos or has a future within next 30 days. See \"Patient Info\" tab in inbasket, or \"Choose Columns\" in Meds & Orders section of the refill encounter.              Passed - Not Fluconazole or Terconazole      If oral Fluconazole or Terconazole, may refill if indicated in progress notes.           Passed - Medication is active on med list           blood glucose monitoring (NO BRAND SPECIFIED) meter device kit 1 kit 0     Sig: Use to test blood sugar 1 times daily or as directed. Preferred blood glucose meter OR supplies to accompany: Blood Glucose Monitor Brands: per insurance.       Diabetic Supplies Protocol Passed - 4/9/2020  9:16 AM        Passed - Medication is active on med list        Passed - Patient is 18 years of age or older        Passed - Recent (6 mo) or future (30 days) visit within the authorizing provider's specialty     Patient had office visit in the last 6 months or has a visit in the next 30 days with authorizing provider.  See \"Patient Info\" tab in inbasket, or \"Choose Columns\" in Meds & Orders section of the refill encounter.               blood glucose (NO BRAND SPECIFIED) test strip 100 strip 6     Sig: Use to test blood sugar 1 times daily or as directed. To accompany: Blood Glucose Monitor Brands: per insurance.       Diabetic Supplies Protocol Passed - 4/9/2020  9:16 AM        Passed - Medication is active on med list        Passed - Patient is 18 years of age or older        Passed - Recent (6 mo) or future (30 days) visit within the authorizing " "provider's specialty     Patient had office visit in the last 6 months or has a visit in the next 30 days with authorizing provider.  See \"Patient Info\" tab in inbasket, or \"Choose Columns\" in Meds & Orders section of the refill encounter.               blood glucose calibration (NO BRAND SPECIFIED) solution 1 Bottle 3     Sig: To accompany: Blood Glucose Monitor Brands: per insurance.       Diabetic Supplies Protocol Passed - 4/9/2020  9:16 AM        Passed - Medication is active on med list        Passed - Patient is 18 years of age or older        Passed - Recent (6 mo) or future (30 days) visit within the authorizing provider's specialty     Patient had office visit in the last 6 months or has a visit in the next 30 days with authorizing provider.  See \"Patient Info\" tab in inbasket, or \"Choose Columns\" in Meds & Orders section of the refill encounter.               thin (NO BRAND SPECIFIED) lancets 100 each 6     Sig: Use with lanceting device. To accompany: Blood Glucose Monitor Brands: per insurance.       Diabetic Supplies Protocol Passed - 4/9/2020  9:16 AM        Passed - Medication is active on med list        Passed - Patient is 18 years of age or older        Passed - Recent (6 mo) or future (30 days) visit within the authorizing provider's specialty     Patient had office visit in the last 6 months or has a visit in the next 30 days with authorizing provider.  See \"Patient Info\" tab in inbasket, or \"Choose Columns\" in Meds & Orders section of the refill encounter.               alcohol swab prep pads 100 each 3     Sig: Use to swab area of injection/ella as directed.       There is no refill protocol information for this order        glipiZIDE (GLUCOTROL XL) 10 MG 24 hr tablet 180 tablet 1     Sig: Take 2 tablets (20 mg) by mouth daily DO NOT CRUSH. Take before or with meals.       Sulfonylurea Agents Passed - 4/9/2020  9:16 AM        Passed - Patient has documented A1c within the specified period of " "time.     If HgbA1C is 8 or greater, it needs to be on file within the past 3 months.  If less than 8, must be on file within the past 6 months.     Recent Labs   Lab Test 03/09/20  0747   A1C 8.8*             Passed - Medication is active on med list        Passed - Patient is age 18 or older        Passed - Patient has a recent creatinine (normal) within the past 12 mos.     Recent Labs   Lab Test 03/09/20  0747   CR 0.85       Ok to refill medication if creatinine is low          Passed - Recent (6 mo) or future (30 days) visit within the authorizing provider's specialty     Patient had office visit in the last 6 months or has a visit in the next 30 days with authorizing provider or within the authorizing provider's specialty.  See \"Patient Info\" tab in inbasket, or \"Choose Columns\" in Meds & Orders section of the refill encounter.               metFORMIN (GLUCOPHAGE) 1000 MG tablet 180 tablet 1     Sig: Take 1 tablet (1,000 mg) by mouth 2 times daily (with meals)       Biguanide Agents Passed - 4/9/2020  9:16 AM        Passed - Patient is age 10 or older        Passed - Patient has documented A1c within the specified period of time.     If HgbA1C is 8 or greater, it needs to be on file within the past 3 months.  If less than 8, must be on file within the past 6 months.     Recent Labs   Lab Test 03/09/20  0747   A1C 8.8*             Passed - Patient's CR is NOT>1.4 OR Patient's EGFR is NOT<45 within past 12 mos.     Recent Labs   Lab Test 03/09/20  0747   GFRESTIMATED >90   GFRESTBLACK >90       Recent Labs   Lab Test 03/09/20  0747   CR 0.85             Passed - Patient does NOT have a diagnosis of CHF.        Passed - Medication is active on med list        Passed - Recent (6 mo) or future (30 days) visit within the authorizing provider's specialty     Patient had office visit in the last 6 months or has a visit in the next 30 days with authorizing provider or within the authorizing provider's specialty.  See " "\"Patient Info\" tab in inbasket, or \"Choose Columns\" in Meds & Orders section of the refill encounter.               simvastatin (ZOCOR) 40 MG tablet 90 tablet 1     Sig: Take 1 tablet (40 mg) by mouth At Bedtime       Statins Protocol Passed - 4/9/2020  9:16 AM        Passed - LDL on file in past 12 months     Recent Labs   Lab Test 03/09/20  0739   LDL 94             Passed - No abnormal creatine kinase in past 12 months     No lab results found.             Passed - Recent (12 mo) or future (30 days) visit within the authorizing provider's specialty     Patient has had an office visit with the authorizing provider or a provider within the authorizing providers department within the previous 12 mos or has a future within next 30 days. See \"Patient Info\" tab in inbasket, or \"Choose Columns\" in Meds & Orders section of the refill encounter.              Passed - Medication is active on med list        Passed - Patient is age 18 or older           dapagliflozin (FARXIGA) 10 MG TABS tablet 90 tablet 1     Sig: Take 1 tablet (10 mg) by mouth daily       Sodium Glucose Co-Transport Inhibitor Agents Passed - 4/9/2020  9:16 AM        Passed - Patient has documented A1c within the specified period of time.     If HgbA1C is 8 or greater, it needs to be on file within the past 3 months.  If less than 8, must be on file within the past 6 months.     Recent Labs   Lab Test 03/09/20  0747   A1C 8.8*             Passed - No creatinine >1.4 or GFR <45 within the past 12 mos     Recent Labs   Lab Test 03/09/20  0747   GFRESTIMATED >90   GFRESTBLACK >90       Recent Labs   Lab Test 03/09/20  0747   CR 0.85             Passed - Medication is active on med list        Passed - Patient is age 18 or older        Passed - Patient has documented normal Potassium within the last 12 mos.     Recent Labs   Lab Test 03/09/20  0747   POTASSIUM 3.9             Passed - Recent (6 mo) or future (30 days) visit within the authorizing provider's " "specialty     Patient had office visit in the last 6 months or has a visit in the next 30 days with authorizing provider or within the authorizing provider's specialty.  See \"Patient Info\" tab in inbasket, or \"Choose Columns\" in Meds & Orders section of the refill encounter.                 "

## 2020-04-09 NOTE — TELEPHONE ENCOUNTER
Prescription approved per [facility] Refill Protocol for different mail order pharmacy.       Valeri Barnes RN, BSN, PHN,

## 2020-04-13 ENCOUNTER — TELEPHONE (OUTPATIENT)
Dept: FAMILY MEDICINE | Facility: CLINIC | Age: 45
End: 2020-04-13

## 2020-04-13 DIAGNOSIS — E11.65 TYPE 2 DIABETES MELLITUS WITH HYPERGLYCEMIA, WITHOUT LONG-TERM CURRENT USE OF INSULIN (H): ICD-10-CM

## 2020-04-13 DIAGNOSIS — E11.9 TYPE 2 DIABETES MELLITUS WITHOUT COMPLICATION (H): ICD-10-CM

## 2020-04-13 NOTE — TELEPHONE ENCOUNTER
Message from [facility] [phone]    dapagliflozin (FARXIGA) 10 MG TABS tablet is non-formulary under your patients coverage.  We received a request by the patient to contact you for the covered alternative Jardiance or Invokana.  Please include strength, directions quantity for a 90 day supply and number of refills.    Ashly Parson

## 2020-06-16 DIAGNOSIS — E11.65 TYPE 2 DIABETES MELLITUS WITH HYPERGLYCEMIA, WITHOUT LONG-TERM CURRENT USE OF INSULIN (H): ICD-10-CM

## 2020-06-17 RX ORDER — GLIPIZIDE 10 MG/1
TABLET, EXTENDED RELEASE ORAL
Qty: 180 TABLET | Refills: 1
Start: 2020-06-17

## 2020-06-17 RX ORDER — CANAGLIFLOZIN 300 MG/1
TABLET, FILM COATED ORAL
Qty: 90 TABLET | Refills: 1
Start: 2020-06-17

## 2020-06-17 NOTE — TELEPHONE ENCOUNTER
90 day supply with 1 refills sent on 4/13/20. Should have refill on file at pharmacy.      Valeri Barnes RN, BSN, PHN

## 2020-08-09 DIAGNOSIS — E11.65 TYPE 2 DIABETES MELLITUS WITH HYPERGLYCEMIA, WITHOUT LONG-TERM CURRENT USE OF INSULIN (H): ICD-10-CM

## 2020-08-11 NOTE — TELEPHONE ENCOUNTER
Lab Results   Component Value Date    A1C 8.8 03/09/2020    A1C 7.9 07/05/2019    A1C 8.1 12/14/2018    A1C 8.2 07/06/2018    A1C 7.3 01/29/2018     Routing refill request to provider for review/approval because:  Labs out of range:  A1C    Darcie Adame RN

## 2020-08-12 RX ORDER — GLIPIZIDE 10 MG/1
TABLET, EXTENDED RELEASE ORAL
Qty: 180 TABLET | Refills: 0 | Status: SHIPPED | OUTPATIENT
Start: 2020-08-12 | End: 2021-06-10

## 2020-08-12 RX ORDER — CANAGLIFLOZIN 300 MG/1
TABLET, FILM COATED ORAL
Qty: 90 TABLET | Refills: 0 | Status: SHIPPED | OUTPATIENT
Start: 2020-08-12 | End: 2020-11-02

## 2020-08-12 NOTE — TELEPHONE ENCOUNTER
Will send a refill, but he is due for labs and follow up with Dr. Villa - please have him schedule

## 2020-10-16 ENCOUNTER — TELEPHONE (OUTPATIENT)
Dept: DERMATOLOGY | Facility: CLINIC | Age: 45
End: 2020-10-16

## 2020-10-16 NOTE — TELEPHONE ENCOUNTER
10/16 3rd attempt. Provided phone number [phone] to schedule in about 1 year (around 11/12/2020).    Melissa Valenzuela   Procedure    Ortho/Sports Med/Pod/Ent/Eye  [facility]   [phone]

## 2020-10-17 DIAGNOSIS — E78.5 HYPERLIPIDEMIA LDL GOAL <100: ICD-10-CM

## 2020-10-20 RX ORDER — SIMVASTATIN 40 MG
TABLET ORAL
Qty: 90 TABLET | Refills: 1 | Status: SHIPPED | OUTPATIENT
Start: 2020-10-20 | End: 2021-06-10

## 2020-10-20 NOTE — TELEPHONE ENCOUNTER
"Medication is being filled for 1 time refill only due to:  Patient needs labs due in March 2021.    Requested Prescriptions   Pending Prescriptions Disp Refills     simvastatin (ZOCOR) 40 MG tablet [Pharmacy Med Name: SIMVASTATIN  40MG  TAB] 90 tablet 3     Sig: TAKE 1 TABLET BY MOUTH AT  BEDTIME       Statins Protocol Passed - 10/17/2020  9:49 PM        Passed - LDL on file in past 12 months     Recent Labs   Lab Test 03/09/20  0739   LDL 94             Passed - No abnormal creatine kinase in past 12 months     No lab results found.             Passed - Recent (12 mo) or future (30 days) visit within the authorizing provider's specialty     Patient has had an office visit with the authorizing provider or a provider within the authorizing providers department within the previous 12 mos or has a future within next 30 days. See \"Patient Info\" tab in inbasket, or \"Choose Columns\" in Meds & Orders section of the refill encounter.              Passed - Medication is active on med list        Passed - Patient is age 18 or older           Valeri Barnes RN, BSN, PHN    "

## 2020-10-23 ENCOUNTER — DOCUMENTATION ONLY (OUTPATIENT)
Dept: LAB | Facility: CLINIC | Age: 45
End: 2020-10-23

## 2020-10-23 DIAGNOSIS — E78.5 HYPERLIPIDEMIA LDL GOAL <100: ICD-10-CM

## 2020-10-23 DIAGNOSIS — R74.8 ELEVATED LIVER ENZYMES: ICD-10-CM

## 2020-10-23 DIAGNOSIS — E11.65 TYPE 2 DIABETES MELLITUS WITH HYPERGLYCEMIA, WITHOUT LONG-TERM CURRENT USE OF INSULIN (H): Primary | ICD-10-CM

## 2020-10-23 NOTE — PROGRESS NOTES
Please place future labs for patient coming in Monday 10/26/2020- states we needs more then the A1c checked. Thank you. - Dee AGUIRRE

## 2020-10-23 NOTE — PROGRESS NOTES
Sent [email or web address] message to patient - not sure what other tests patient wanted done- can you please draw a rainbow since Dr. Villa is out of the office when he comes in on Monday - added just in case labs

## 2020-10-26 DIAGNOSIS — R74.8 ELEVATED LIVER ENZYMES: ICD-10-CM

## 2020-10-26 DIAGNOSIS — E11.65 TYPE 2 DIABETES MELLITUS WITH HYPERGLYCEMIA, WITHOUT LONG-TERM CURRENT USE OF INSULIN (H): ICD-10-CM

## 2020-10-26 LAB
ALBUMIN SERPL-MCNC: 4.2 G/DL (ref 3.4–5)
ALP SERPL-CCNC: 99 U/L (ref 40–150)
ALT SERPL W P-5'-P-CCNC: 96 U/L (ref 0–70)
ANION GAP SERPL CALCULATED.3IONS-SCNC: 3 MMOL/L (ref 3–14)
AST SERPL W P-5'-P-CCNC: 35 U/L (ref 0–45)
BILIRUB SERPL-MCNC: 0.7 MG/DL (ref 0.2–1.3)
BUN SERPL-MCNC: 10 MG/DL (ref 7–30)
CALCIUM SERPL-MCNC: 8.7 MG/DL (ref 8.5–10.1)
CHLORIDE SERPL-SCNC: 106 MMOL/L (ref 94–109)
CO2 SERPL-SCNC: 30 MMOL/L (ref 20–32)
CREAT SERPL-MCNC: 0.84 MG/DL (ref 0.66–1.25)
CREAT UR-MCNC: 70 MG/DL
GFR SERPL CREATININE-BSD FRML MDRD: >90 ML/MIN/{1.73_M2}
GLUCOSE SERPL-MCNC: 167 MG/DL (ref 70–99)
HBA1C MFR BLD: 9.4 % (ref 0–5.6)
MICROALBUMIN UR-MCNC: 8 MG/L
MICROALBUMIN/CREAT UR: 12.09 MG/G CR (ref 0–17)
POTASSIUM SERPL-SCNC: 4.3 MMOL/L (ref 3.4–5.3)
PROT SERPL-MCNC: 7.2 G/DL (ref 6.8–8.8)
SODIUM SERPL-SCNC: 139 MMOL/L (ref 133–144)

## 2020-10-26 PROCEDURE — 83036 HEMOGLOBIN GLYCOSYLATED A1C: CPT | Performed by: FAMILY MEDICINE

## 2020-10-26 PROCEDURE — 82043 UR ALBUMIN QUANTITATIVE: CPT | Performed by: FAMILY MEDICINE

## 2020-10-26 PROCEDURE — 80053 COMPREHEN METABOLIC PANEL: CPT | Performed by: FAMILY MEDICINE

## 2020-10-26 PROCEDURE — 36415 COLL VENOUS BLD VENIPUNCTURE: CPT | Performed by: FAMILY MEDICINE

## 2020-10-27 ENCOUNTER — MYC MEDICAL ADVICE (OUTPATIENT)
Dept: FAMILY MEDICINE | Facility: CLINIC | Age: 45
End: 2020-10-27

## 2020-10-27 ENCOUNTER — TELEPHONE (OUTPATIENT)
Dept: FAMILY MEDICINE | Facility: CLINIC | Age: 45
End: 2020-10-27

## 2020-10-27 NOTE — TELEPHONE ENCOUNTER
Routing refill request to provider for review/approval because:  Drug not active on patient's medication list    Steph Coon RN  [facility]

## 2020-10-28 NOTE — TELEPHONE ENCOUNTER
Message sent to instruct patient to follow up in office due to high A1C - telephone/video  visit is ok.  PSK

## 2020-11-02 ENCOUNTER — OFFICE VISIT (OUTPATIENT)
Dept: FAMILY MEDICINE | Facility: CLINIC | Age: 45
End: 2020-11-02
Payer: COMMERCIAL

## 2020-11-02 VITALS
WEIGHT: 218 LBS | DIASTOLIC BLOOD PRESSURE: 86 MMHG | SYSTOLIC BLOOD PRESSURE: 126 MMHG | HEIGHT: 69 IN | TEMPERATURE: 96.5 F | BODY MASS INDEX: 32.29 KG/M2 | OXYGEN SATURATION: 97 % | HEART RATE: 93 BPM

## 2020-11-02 DIAGNOSIS — E11.65 TYPE 2 DIABETES MELLITUS WITH HYPERGLYCEMIA, WITHOUT LONG-TERM CURRENT USE OF INSULIN (H): Primary | ICD-10-CM

## 2020-11-02 DIAGNOSIS — E78.5 HYPERLIPIDEMIA LDL GOAL <100: ICD-10-CM

## 2020-11-02 PROCEDURE — 99207 PR FOOT EXAM NO CHARGE: CPT | Performed by: FAMILY MEDICINE

## 2020-11-02 PROCEDURE — 99214 OFFICE O/P EST MOD 30 MIN: CPT | Performed by: FAMILY MEDICINE

## 2020-11-02 ASSESSMENT — MIFFLIN-ST. JEOR: SCORE: 1859.22

## 2020-11-02 ASSESSMENT — PAIN SCALES - GENERAL: PAINLEVEL: NO PAIN (0)

## 2020-11-02 NOTE — PATIENT INSTRUCTIONS
Continue current medication with exception of change to Jardiance 25 mg instead of the Invokana 300 due insurance coverage.    Limit the ice tea as discussed.   Focus on diet and exercise.  Follow up labs in 3 months.   Monitor blood sugars at home - if >150 consistently in the AM, notify me in the next 2-3 weeks.       At [facility], we strive to deliver an exceptional experience to you, every time we see you. If you receive a survey, please complete it as we do value your feedback.  If you have MyChart, you can expect to receive results automatically within 24 hours of their completion.  Your provider will send a note interpreting your results as well.   If you do not have MyChart, you should receive your results in about a week by mail.    Your care team:                            Family Medicine Internal Medicine   MD Thiago Dobson, MD Lay Jang, MD George Kumar, MD Jeannine Reid PA-C  Shraddha Whiteside, APRN CNP    Korey Gaston, MD Pediatrics   Bill Gross, PA-C  Clarissa Hurst, CNP Kassie Ordoñez, MD Bee Morgan APRN CNP   MD Sue Skaggs MD Deborah Mielke, MD Eloisa Farris, APRN CNP  Marce Sesay, PA-C  Henrietta Hernandez, CNP  MD tSeffanie Garvin MD Angela Wermerskirchen, MD      Clinic hours: Monday - Thursday 7 am-7 pm; Fridays 7 am-5 pm.   Urgent care: Monday - Friday 11 am-9 pm; Saturday and Sunday 9 am-5 pm.    Clinic: [phone]       [facility]: Monday - Thursday 8 am - 7 pm; Friday 8 am - 6 pm  [facility]: [phone]     Use [email or web address] for 24/7 diagnosis and treatment of dozens of conditions.

## 2020-11-02 NOTE — PROGRESS NOTES
Subjective     Jose Crews is a 45 year old male who presents to clinic today for the following health issues:    HPI         Diabetes Follow-up    How often are you checking your blood sugar? One time daily  What time of day are you checking your blood sugars (select all that apply)?  Before meals 140   Have you had any blood sugars above 200?  No  Have you had any blood sugars below 70?  No    What symptoms do you notice when your blood sugar is low?  None    What concerns do you have today about your diabetes?Other: talk about A1c was a little high      Do you have any of these symptoms? (Select all that apply)  No numbness or tingling in feet.  No redness, sores or blisters on feet.  No complaints of excessive thirst.  No reports of blurry vision.  No significant changes to weight.    Have you had a diabetic eye exam in the last 12 months? No  Less exercise, diet improved.      Recent loss of job - last day tomorrow.  Has job options.      BP Readings from Last 2 Encounters:   11/02/20 126/86   06/13/19 128/86     Hemoglobin A1C (%)   Date Value   10/26/2020 9.4 (H)   03/09/2020 8.8 (H)     LDL Cholesterol Calculated (mg/dL)   Date Value   03/09/2020 94   07/05/2019 89     Wt Readings from Last 5 Encounters:   11/02/20 98.9 kg (218 lb)   06/13/19 99.1 kg (218 lb 8 oz)   08/13/18 101.2 kg (223 lb)   02/01/18 100.5 kg (221 lb 8 oz)   07/06/17 99.3 kg (219 lb)           How many servings of fruits and vegetables do you eat daily?  2-3    On average, how many sweetened beverages do you drink each day (Examples: soda, juice, sweet tea, etc.  Do NOT count diet or artificially sweetened beverages)?   5    How many days per week do you exercise enough to make your heart beat faster? 3 or less  Planning to increase exercise soon with job change.    How many minutes a day do you exercise enough to make your heart beat faster? 9 or less    How many days per week do you miss taking your medication? 0    Hyperlipidemia  "Follow-Up      Are you regularly taking any medication or supplement to lower your cholesterol?   Yes- simvastatin    Are you having muscle aches or other side effects that you think could be caused by your cholesterol lowering medication?  No        Review of Systems   Constitutional, HEENT, cardiovascular, pulmonary, gi and gu systems are negative, except as otherwise noted.      Objective    /86 (BP Location: Left arm, Patient Position: Chair, Cuff Size: Adult Regular)   Pulse 93   Temp 96.5  F (35.8  C) (Tympanic)   Ht 1.753 m (5' 9\")   Wt 98.9 kg (218 lb)   SpO2 97%   BMI 32.19 kg/m    Body mass index is 32.19 kg/m .  Physical Exam   GENERAL: alert, no distress and obese  NECK: no adenopathy, no asymmetry, masses, or scars and thyroid normal to palpation  RESP: lungs clear to auscultation - no rales, rhonchi or wheezes  CV: regular rate and rhythm, normal S1 S2, no S3 or S4, no murmur, click or rub, no peripheral edema and peripheral pulses strong  ABDOMEN: soft, nontender, no hepatosplenomegaly, no masses and bowel sounds normal  MS: no gross musculoskeletal defects noted, no edema  PSYCH: mentation appears normal, affect normal/bright  Diabetic foot exam: normal DP and PT pulses, no trophic changes or ulcerative lesions, normal sensory exam and normal monofilament exam    Orders Only on 10/26/2020   Component Date Value Ref Range Status     Hemoglobin A1C 10/26/2020 9.4* 0 - 5.6 % Final    Comment: Normal <5.7% Prediabetes 5.7-6.4%  Diabetes 6.5% or higher - adopted from ADA   consensus guidelines.       Creatinine Urine 10/26/2020 70  mg/dL Final     Albumin Urine mg/L 10/26/2020 8  mg/L Final     Albumin Urine mg/g Cr 10/26/2020 12.09  0 - 17 mg/g Cr Final     Sodium 10/26/2020 139  133 - 144 mmol/L Final     Potassium 10/26/2020 4.3  3.4 - 5.3 mmol/L Final     Chloride 10/26/2020 106  94 - 109 mmol/L Final     Carbon Dioxide 10/26/2020 30  20 - 32 mmol/L Final     Anion Gap 10/26/2020 3  3 - 14 " "mmol/L Final     Glucose 10/26/2020 167* 70 - 99 mg/dL Final    Fasting specimen     Urea Nitrogen 10/26/2020 10  7 - 30 mg/dL Final     Creatinine 10/26/2020 0.84  0.66 - 1.25 mg/dL Final     GFR Estimate 10/26/2020 >90  >60 mL/min/[1.73_m2] Final    Comment: Non  GFR Calc  Starting 12/18/2018, serum creatinine based estimated GFR (eGFR) will be   calculated using the Chronic Kidney Disease Epidemiology Collaboration   (CKD-EPI) equation.       GFR Estimate If Black 10/26/2020 >90  >60 mL/min/[1.73_m2] Final    Comment:  GFR Calc  Starting 12/18/2018, serum creatinine based estimated GFR (eGFR) will be   calculated using the Chronic Kidney Disease Epidemiology Collaboration   (CKD-EPI) equation.       Calcium 10/26/2020 8.7  8.5 - 10.1 mg/dL Final     Bilirubin Total 10/26/2020 0.7  0.2 - 1.3 mg/dL Final     Albumin 10/26/2020 4.2  3.4 - 5.0 g/dL Final     Protein Total 10/26/2020 7.2  6.8 - 8.8 g/dL Final     Alkaline Phosphatase 10/26/2020 99  40 - 150 U/L Final     ALT 10/26/2020 96* 0 - 70 U/L Final     AST 10/26/2020 35  0 - 45 U/L Final           Assessment & Plan   Problem List Items Addressed This Visit     Hyperlipidemia LDL goal <100    Type 2 diabetes mellitus with hyperglycemia, without long-term current use of insulin (H) - Primary    Relevant Medications    empagliflozin (JARDIANCE) 25 MG TABS tablet    Other Relevant Orders    FOOT EXAM (Completed)         Attention to diet and exercise with less stress from upcoming job change planned.  Insurance not covering Invokana - script for Jardiance sent in for patient.  Recheck A1C in 3 months.    BMI:   Estimated body mass index is 32.19 kg/m  as calculated from the following:    Height as of this encounter: 1.753 m (5' 9\").    Weight as of this encounter: 98.9 kg (218 lb).   Weight management plan: Discussed healthy diet and exercise guidelines           Patient Instructions     Continue current medication with exception of " change to Jardiance 25 mg instead of the Invokana 300 due insurance coverage.    Limit the ice tea as discussed.   Focus on diet and exercise.  Follow up labs in 3 months.   Monitor blood sugars at home - if >150 consistently in the AM, notify me in the next 2-3 weeks.       At [facility], we strive to deliver an exceptional experience to you, every time we see you. If you receive a survey, please complete it as we do value your feedback.  If you have MyChart, you can expect to receive results automatically within 24 hours of their completion.  Your provider will send a note interpreting your results as well.   If you do not have MyChart, you should receive your results in about a week by mail.    Your care team:                            Family Medicine Internal Medicine   MD Thiago Dobson, MD George Cortes MD Katya Georgiev PA-C  Shraddha Whiteside, APRN CNP    Korey Gaston, MD Pediatrics   Bill Gross, PA-C  Clarissa Hurst, CNP Kassie Ordoñez, MD Bee Morgan APRN CNP   MD Sue Skaggs MD Deborah Mielke, MD Eloisa Farris, APRN CNP  Marce Sesay, PA-C  Henrietta Hernandez, CNP  MD Steffanie Garvin MD Angela Wermerskirchen, MD      Clinic hours: Monday - Thursday 7 am-7 pm; Fridays 7 am-5 pm.   Urgent care: Monday - Friday 11 am-9 pm; Saturday and Sunday 9 am-5 pm.    Clinic: [phone]       [facility]: Monday - Thursday 8 am - 7 pm; Friday 8 am - 6 pm  [facility]: [phone]     Use www.oncare.org for 24/7 diagnosis and treatment of dozens of conditions.    Return in about 3 months (around 2/2/2021) for Lab Work.    Steffanie Villa MD  [facility]

## 2020-12-06 ENCOUNTER — HEALTH MAINTENANCE LETTER (OUTPATIENT)
Age: 45
End: 2020-12-06

## 2021-06-06 ENCOUNTER — HEALTH MAINTENANCE LETTER (OUTPATIENT)
Age: 46
End: 2021-06-06

## 2021-06-07 DIAGNOSIS — E11.65 TYPE 2 DIABETES MELLITUS WITH HYPERGLYCEMIA, WITHOUT LONG-TERM CURRENT USE OF INSULIN (H): ICD-10-CM

## 2021-06-07 LAB — HBA1C MFR BLD: 11.9 % (ref 0–5.6)

## 2021-06-07 PROCEDURE — 83036 HEMOGLOBIN GLYCOSYLATED A1C: CPT | Performed by: FAMILY MEDICINE

## 2021-06-07 PROCEDURE — 36415 COLL VENOUS BLD VENIPUNCTURE: CPT | Performed by: FAMILY MEDICINE

## 2021-06-08 NOTE — RESULT ENCOUNTER NOTE
Your long-term blood sugar testing is much higher than previous and in a range that will require adjustments in your medication.  We can discuss which you have recently been taking when you have your appointment on the 10th and make plans for adjustment where needed.  Please call or MyChart message me if you have any questions.    FLYK

## 2021-06-10 ENCOUNTER — VIRTUAL VISIT (OUTPATIENT)
Dept: FAMILY MEDICINE | Facility: CLINIC | Age: 46
End: 2021-06-10
Payer: COMMERCIAL

## 2021-06-10 DIAGNOSIS — E78.5 HYPERLIPIDEMIA LDL GOAL <100: ICD-10-CM

## 2021-06-10 DIAGNOSIS — E11.65 TYPE 2 DIABETES MELLITUS WITH HYPERGLYCEMIA, WITHOUT LONG-TERM CURRENT USE OF INSULIN (H): ICD-10-CM

## 2021-06-10 PROCEDURE — 99214 OFFICE O/P EST MOD 30 MIN: CPT | Mod: 95 | Performed by: FAMILY MEDICINE

## 2021-06-10 RX ORDER — GLUCOSAMINE HCL/CHONDROITIN SU 500-400 MG
CAPSULE ORAL
Qty: 100 EACH | Refills: 3 | Status: SHIPPED | OUTPATIENT
Start: 2021-06-10 | End: 2022-07-21

## 2021-06-10 RX ORDER — LANCETS
EACH MISCELLANEOUS
Qty: 100 EACH | Refills: 6 | Status: SHIPPED | OUTPATIENT
Start: 2021-06-10 | End: 2022-07-21

## 2021-06-10 RX ORDER — GLIPIZIDE 10 MG/1
TABLET, FILM COATED, EXTENDED RELEASE ORAL
Qty: 180 TABLET | Refills: 1 | Status: SHIPPED | OUTPATIENT
Start: 2021-06-10 | End: 2021-12-13

## 2021-06-10 RX ORDER — SIMVASTATIN 40 MG
40 TABLET ORAL AT BEDTIME
Qty: 90 TABLET | Refills: 1 | Status: SHIPPED | OUTPATIENT
Start: 2021-06-10 | End: 2021-12-13

## 2021-06-10 NOTE — PATIENT INSTRUCTIONS
Refill medications now.  I have sent in the script for testing supplies as well.   IF you are continuing to note elevated blood sugars after being on the medication, notify me for additional adjustments before next appointment in 3 months.

## 2021-06-10 NOTE — PROGRESS NOTES
Jose is a 45 year old who is being evaluated via a billable video visit.      How would you like to obtain your AVS? MyChart  If the video visit is dropped, the invitation should be resent by: Text to cell phone: x  [phone]   Will anyone else be joining your video visit? No      Video Start Time: 5:08 pm    Assessment & Plan     Type 2 diabetes mellitus with hyperglycemia, without long-term current use of insulin (H)  Refills given for previous medication and testing supplies.  He will let me know if blood pressures are elevated in the next 3 months and additional adjustments can be made.  Follow-up 3 months for physical along with lab testing.  - blood glucose monitoring (NO BRAND SPECIFIED) meter device kit; Use to test blood sugar 1 times daily or as directed. Preferred blood glucose meter OR supplies to accompany: Blood Glucose Monitor Brands: per insurance.  - blood glucose (NO BRAND SPECIFIED) test strip; Use to test blood sugar 1 times daily or as directed. To accompany: Blood Glucose Monitor Brands: per insurance.  - blood glucose calibration (NO BRAND SPECIFIED) solution; To accompany: Blood Glucose Monitor Brands: per insurance.  - thin (NO BRAND SPECIFIED) lancets; Use with lanceting device. To accompany: Blood Glucose Monitor Brands: per insurance.  - alcohol swab prep pads; Use to swab area of injection/ella as directed.  - metFORMIN (GLUCOPHAGE) 1000 MG tablet; Take 1 tablet (1,000 mg) by mouth 2 times daily (with meals)  - glipiZIDE (GLIPIZIDE XL) 10 MG 24 hr tablet; TAKE 2 TABLETS BY MOUTH  DAILY.  DO NOT CRUSH. TAKE  BEFORE OR WITH MEALS.  - empagliflozin (JARDIANCE) 25 MG TABS tablet; Take 1 tablet (25 mg) by mouth daily  - **A1C FUTURE anytime; Future  - **Comprehensive metabolic panel FUTURE anytime; Future  - Albumin Random Urine Quantitative with Creat Ratio; Future    Hyperlipidemia LDL goal <100  Resume cholesterol medication follow-up lab in 3 months.  - simvastatin (ZOCOR) 40 MG  "tablet; Take 1 tablet (40 mg) by mouth At Bedtime  - **Comprehensive metabolic panel FUTURE anytime; Future  - Lipid panel reflex to direct LDL Fasting; Future             BMI:   Estimated body mass index is 32.19 kg/m  as calculated from the following:    Height as of 11/2/20: 1.753 m (5' 9\").    Weight as of 11/2/20: 98.9 kg (218 lb).   Weight management plan: Discussed healthy diet and exercise guidelines    Patient Instructions   Refill medications now.  I have sent in the script for testing supplies as well.   IF you are continuing to note elevated blood sugars after being on the medication, notify me for additional adjustments before next appointment in 3 months.        Return in about 3 months (around 9/10/2021) for Physical Exam, Lab Work, chronic health problems.    Steffanie Villa MD  [facility] EVELIN Garcia is a 45 year old who presents for the following health issues     HPI   Was without health insurance for a period of time which prevented getting the jardiance regularly.  Much better work environment.  Normal schedule.  Less stress. 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      Diabetes Follow-up      How often are you checking your blood sugar? Not at all    What concerns do you have today about your diabetes? None and Other: Resuming medications now.  Has been out of the medication due to lack of insurance.  Needs testing supplies     Do you have any of these symptoms? (Select all that apply)  No numbness or tingling in feet.  No redness, sores or blisters on feet.  No complaints of excessive thirst.  No reports of blurry vision.  No significant changes to weight.    Have you had a diabetic eye exam in the last 12 months? No    Has not been exercising and watching diet well.  Getting back to work has been beneficial at improving focus and helping watching diet - snacking less.  Watching portion sizes.        BP Readings from Last 2 Encounters:   11/02/20 126/86 "   06/13/19 128/86     Hemoglobin A1C (%)   Date Value   06/07/2021 11.9 (H)   10/26/2020 9.4 (H)     LDL Cholesterol Calculated (mg/dL)   Date Value   03/09/2020 94   07/05/2019 89                       How many days per week do you exercise enough to make your heart beat faster? 3 or less    How many minutes a day do you exercise enough to make your heart beat faster? 20 - 29    How many days per week do you miss taking your medication?  Recently missed medication due to lack of insurance but generally regular with medication use.    Hyperlipidemia Follow-Up      Are you regularly taking any medication or supplement to lower your cholesterol?   Yes- Simvastatin    Are you having muscle aches or other side effects that you think could be caused by your cholesterol lowering medication?  No        Review of Systems   Constitutional, HEENT, cardiovascular, pulmonary, gi and gu systems are negative, except as otherwise noted.      Objective           Vitals:  No vitals were obtained today due to virtual visit.    Physical Exam   GENERAL: alert, no distress and obese  EYES: Eyes grossly normal to inspection.  No discharge or erythema, or obvious scleral/conjunctival abnormalities.  RESP: No audible wheeze, cough, or visible cyanosis.  No visible retractions or increased work of breathing.    SKIN: Visible skin clear. No significant rash, abnormal pigmentation or lesions.  NEURO: Cranial nerves grossly intact.  Mentation and speech appropriate for age.  PSYCH: Mentation appears normal, affect normal/bright, judgement and insight intact, normal speech and appearance well-groomed.    Orders Only on 06/07/2021   Component Date Value Ref Range Status     Hemoglobin A1C 06/07/2021 11.9* 0 - 5.6 % Final    Comment: Results confirmed by repeat test  Normal <5.7% Prediabetes 5.7-6.4%  Diabetes 6.5% or higher - adopted from ADA   consensus guidelines.                   Video-Visit Details    Type of service:  Video Visit    Video  End Time:5:27 pm    Originating Location (pt. Location): Home    Distant Location (provider location):  [facility]     Platform used for Video Visit: [email or web address]        This chart was documented by provider using a voice activated software called Dragon in addition to manual typing. There may be vocabulary errors or other grammatical errors due to this.

## 2021-09-26 ENCOUNTER — HEALTH MAINTENANCE LETTER (OUTPATIENT)
Age: 46
End: 2021-09-26

## 2021-12-05 DIAGNOSIS — E78.5 HYPERLIPIDEMIA LDL GOAL <100: ICD-10-CM

## 2021-12-05 DIAGNOSIS — E11.65 TYPE 2 DIABETES MELLITUS WITH HYPERGLYCEMIA, WITHOUT LONG-TERM CURRENT USE OF INSULIN (H): ICD-10-CM

## 2021-12-06 NOTE — TELEPHONE ENCOUNTER
"Requested Prescriptions   Pending Prescriptions Disp Refills    glipiZIDE (GLUCOTROL XL) 10 MG 24 hr tablet [Pharmacy Med Name: GLIPIZIDE ER 10MG TB24] 180 tablet 1     Sig: TAKE TWO TABLETS BY MOUTH DAILY BEFORE OR WITH A MEAL. DO NOT CRUSH.        Sulfonylurea Agents Failed - 12/5/2021  3:52 AM        Failed - Patient has documented A1c within the specified period of time.     If HgbA1C is 8 or greater, it needs to be on file within the past 3 months.  If less than 8, must be on file within the past 6 months.     Recent Labs   Lab Test 06/07/21 0724   A1C 11.9*             Failed - Patient has a recent creatinine (normal) within the past 12 mos.     Recent Labs   Lab Test 10/26/20  1036   CR 0.84       Ok to refill medication if creatinine is low          Passed - Medication is active on med list        Passed - Patient is age 18 or older        Passed - Recent (6 mo) or future (30 days) visit within the authorizing provider's specialty     Patient had office visit in the last 6 months or has a visit in the next 30 days with authorizing provider or within the authorizing provider's specialty.  See \"Patient Info\" tab in inbasket, or \"Choose Columns\" in Meds & Orders section of the refill encounter.               metFORMIN (GLUCOPHAGE) 1000 MG tablet [Pharmacy Med Name: METFORMIN HCL 1000MG TABS] 180 tablet 1     Sig: TAKE ONE TABLET BY MOUTH TWICE A DAY WITH MEALS        Biguanide Agents Failed - 12/5/2021  3:52 AM        Failed - Patient has documented A1c within the specified period of time.     If HgbA1C is 8 or greater, it needs to be on file within the past 3 months.  If less than 8, must be on file within the past 6 months.     Recent Labs   Lab Test 06/07/21 0724   A1C 11.9*             Failed - Patient's CR is NOT>1.4 OR Patient's EGFR is NOT<45 within past 12 mos.       Recent Labs   Lab Test 10/26/20  1036   GFRESTIMATED >90   GFRESTBLACK >90       Recent Labs   Lab Test 10/26/20  1036   CR 0.84         " "    Passed - Patient is age 10 or older        Passed - Patient does NOT have a diagnosis of CHF.        Passed - Medication is active on med list        Passed - Recent (6 mo) or future (30 days) visit within the authorizing provider's specialty     Patient had office visit in the last 6 months or has a visit in the next 30 days with authorizing provider or within the authorizing provider's specialty.  See \"Patient Info\" tab in inbasket, or \"Choose Columns\" in Meds & Orders section of the refill encounter.               simvastatin (ZOCOR) 40 MG tablet [Pharmacy Med Name: SIMVASTATIN 40MG TABS] 90 tablet 1     Sig: TAKE ONE TABLET BY MOUTH AT BEDTIME        Statins Protocol Failed - 12/5/2021  3:52 AM        Failed - LDL on file in past 12 months     Recent Labs   Lab Test 03/09/20  0739   LDL 94               Passed - No abnormal creatine kinase in past 12 months     No lab results found.             Passed - Recent (12 mo) or future (30 days) visit within the authorizing provider's specialty     Patient has had an office visit with the authorizing provider or a provider within the authorizing providers department within the previous 12 mos or has a future within next 30 days. See \"Patient Info\" tab in inbasket, or \"Choose Columns\" in Meds & Orders section of the refill encounter.              Passed - Medication is active on med list        Passed - Patient is age 18 or older           JARDIANCE 25 MG TABS tablet [Pharmacy Med Name: JARDIANCE 25MG TABS] 90 tablet 1     Sig: TAKE ONE TABLET BY MOUTH EVERY DAY        Sodium Glucose Co-Transport Inhibitor Agents Failed - 12/5/2021  3:52 AM        Failed - Patient has documented A1c within the specified period of time.     If HgbA1C is 8 or greater, it needs to be on file within the past 3 months.  If less than 8, must be on file within the past 6 months.     Recent Labs   Lab Test 06/07/21  0724   A1C 11.9*             Failed - No creatinine >1.4 or GFR <45 within " "the past 12 mos       Recent Labs   Lab Test 10/26/20  1036   GFRESTIMATED >90   GFRESTBLACK >90       Recent Labs   Lab Test 10/26/20  1036   CR 0.84             Failed - Patient has documented normal Potassium within the last 12 mos.     Recent Labs   Lab Test 10/26/20  1036   POTASSIUM 4.3               Passed - Medication is active on med list        Passed - Patient is age 18 or older        Passed - Recent (6 mo) or future (30 days) visit within the authorizing provider's specialty     Patient had office visit in the last 6 months or has a visit in the next 30 days with authorizing provider or within the authorizing provider's specialty.  See \"Patient Info\" tab in inbasket, or \"Choose Columns\" in Meds & Orders section of the refill encounter.                  "

## 2021-12-07 NOTE — TELEPHONE ENCOUNTER
Patient needs to be seen by lab   (provider or resource)  Is there a time frame in which the patient should be seen Yes: within 2 weeks.  What does the patient need to be seen regarding labs - Diabetes, lipids    Does patient need to come fasting Yes  Phone: [phone] (home)  When workflow completed Route to provider if refill needed to get to lab appointment.    Clinic: [facility] at [phone]    'Hi, this is (your name) I am calling from (provider's name) office. After reviewing your chart, (Provider's name) has indicated that you need an appointment to review (reason for visit). May I assist you in making this appointment? (Please contact us via [employer] or by phone at (Clinic name and Phone number) to schedule this appointment at your earliest convenience)'    Was first outreach attempted?No  If yes, the Second attempt due on:

## 2021-12-13 ENCOUNTER — NURSE TRIAGE (OUTPATIENT)
Dept: NURSING | Facility: CLINIC | Age: 46
End: 2021-12-13
Payer: COMMERCIAL

## 2021-12-13 DIAGNOSIS — E11.65 TYPE 2 DIABETES MELLITUS WITH HYPERGLYCEMIA, WITHOUT LONG-TERM CURRENT USE OF INSULIN (H): ICD-10-CM

## 2021-12-13 DIAGNOSIS — E11.65 TYPE 2 DIABETES MELLITUS WITH HYPERGLYCEMIA, WITHOUT LONG-TERM CURRENT USE OF INSULIN (H): Primary | ICD-10-CM

## 2021-12-13 DIAGNOSIS — E78.5 HYPERLIPIDEMIA LDL GOAL <100: ICD-10-CM

## 2021-12-13 RX ORDER — EMPAGLIFLOZIN 25 MG/1
TABLET, FILM COATED ORAL
Qty: 30 TABLET | Refills: 0 | Status: SHIPPED | OUTPATIENT
Start: 2021-12-13 | End: 2021-12-30

## 2021-12-13 RX ORDER — SIMVASTATIN 40 MG
TABLET ORAL
Qty: 30 TABLET | Refills: 0 | Status: SHIPPED | OUTPATIENT
Start: 2021-12-13 | End: 2021-12-30

## 2021-12-13 RX ORDER — GLIPIZIDE 10 MG/1
TABLET, FILM COATED, EXTENDED RELEASE ORAL
Qty: 60 TABLET | Refills: 0 | Status: SHIPPED | OUTPATIENT
Start: 2021-12-13 | End: 2021-12-30

## 2021-12-13 NOTE — TELEPHONE ENCOUNTER
Patient is now scheduled for an upcoming appointment on   Appointments in Next Year    Dec 30, 2021  7:00 AM  (Arrive by 6:45 AM)  Provider Visit with Germain Bocanegra MD  [facility] ([facility] ) [phone]      Please send if appropriate. The rest of the medications have been pended to Dr. Villa. I will inform her that he is scheduled.    Genna Quesada RN [facility]

## 2021-12-13 NOTE — TELEPHONE ENCOUNTER
Patient is now scheduled for an appointment.  Appointments in Next Year    Dec 30, 2021  7:00 AM  (Arrive by 6:45 AM)  Provider Visit with Germain Bocanegra MD  [facility] ([facility] ) [phone]        If appropriate please send in enough medication for him to make it to appointment.    Genna Quesada RN [facility]

## 2021-12-13 NOTE — TELEPHONE ENCOUNTER
Pt calls to report diabetic Rx's currently depleted.  Pt attempted to self-schedule diabetic check online; however no open appt slots until March 2022.  Therefore now warm-transferring to  to obtain earlier appt for Diabetic Check.  Appt now scheduled for Dec 30, 2021.    In the interim, pt asks for bridging supply please, of his diabetic meds:  - Glipizide  - Metformin  - Jardiance  - Test strips    Pharmacy verified -> [facility] in [place].    Best phone # for pt (if needed) -> [phone]     Thank you-    Yamel [facility]GAR [facility] Nurse Advisor     Reason for Disposition    Request for URGENT new prescription or refill of 'essential' medication (i.e., likelihood of harm to patient if not taken) and triager unable to fill per department policy    Protocols used: MEDICATION QUESTION CALL-A-[place]

## 2021-12-13 NOTE — TELEPHONE ENCOUNTER
please read provider message as written to pt and help make the lab appointment.  Mikayla ARTISN, RN

## 2021-12-14 NOTE — TELEPHONE ENCOUNTER
1 month Refill sent to get to follow up appointment.  Follow up needed for additional refills .    FLYK

## 2021-12-26 NOTE — PROGRESS NOTES
Assessment & Plan     1. Type 2 diabetes mellitus with hyperglycemia. A1c 9.0. Currently on Metformin 2 g a day, glipizide 20 mg a day and empagliflozin 25 mg a day. Discussed getting a better glycemic control with a goal to get A1c close to or less than 7. The next option would be to consider GLP-1 inhibitor or insulin. After discussing pros and cons of each patient chose to consider oral GLP-1 inhibitor semaglutide.    We will start semaglutide at 3 mg daily. Side effects discussed. After 30 days consider increasing it to 7 mg a day for additional 30 days followed by increase to 14 mg a day if he tolerates it well. Is asked to reach out by 30 days to prescribe the next dose. Follow-up with his primary provider  in May with A1c.  2. Hyperlipidemia under control with simvastatin 40 mg a day. Cholesterol 149, HDL 32 LDL 59 triglyceride 290 on 12/28/2021. Continue with current dose of simvastatin.  3. Class I obesity with BMI 31. Patient has central obesity. He has metabolic syndrome. He is insulin resistant, central obesity, low HDL and high triglyceride. He is not hypertensive. Discussed metabolic syndrome and importance of diet, weight reduction and regular exercise. Currently not exercising.  4. Booster COVID-19 vaccine provided today.    Germain Bocanegra MD  [facility]   Jose is a 46 year old who presents for the following health issues     History of Present Illness       Diabetes:   He presents for follow up of diabetes.  He is checking home blood glucose a few times a month. He checks blood glucose before meals.  Blood glucose is sometimes over 200 and never under 70. He is aware of hypoglycemia symptoms including lethargy. He is concerned about blood sugar frequently over 200.  He is not experiencing numbness or burning in feet, excessive thirst, blurry vision, weight changes or redness, sores or blisters on feet. The patient has not had a diabetic eye exam in  the last 12 months.         He eats 2-3 servings of fruits and vegetables daily.He consumes 2 sweetened beverage(s) daily.He exercises with enough effort to increase his heart rate 9 or less minutes per day.  He exercises with enough effort to increase his heart rate 3 or less days per week.   He is taking medications regularly.       Diabetes Follow-up    46-year-old gentleman with history of hyperlipidemia and diabetes has come in for follow-up. He was unable to get in with his primary provider in a timely fashion so came to see me. He denies chest pain, shortness of breath dizziness lightheadedness. No tingling numbness. Is been more than a year since his eye exams are recommend that he get it checked. Patient requested booster COVID-19 vaccine. Indicates he takes his medication as prescribed. He does not exercise regularly. 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 Proximal home currently.      BP Readings from Last 2 Encounters:   12/30/21 122/84   11/02/20 126/86     Hemoglobin A1C POCT (%)   Date Value   06/07/2021 11.9 (H)   10/26/2020 9.4 (H)     Hemoglobin A1C (%)   Date Value   12/28/2021 9.0 (H)     LDL Cholesterol Calculated (mg/dL)   Date Value   12/28/2021 59   03/09/2020 94   07/05/2019 89                     Review of Systems   Constitutional, HEENT, cardiovascular, pulmonary, GI, , musculoskeletal, neuro, skin, endocrine and psych systems are negative, except as otherwise noted.      Objective    /84 (BP Location: Right arm, Patient Position: Sitting, Cuff Size: Adult Large)   Pulse 89   Resp 20   Wt 94.5 kg (208 lb 6 oz)   SpO2 94%   BMI 30.77 kg/m    Body mass index is 30.77 kg/m .  Physical Exam   GENERAL: healthy, alert and no distress  NECK: no adenopathy, no asymmetry, masses, or scars and thyroid normal to palpation  RESP: lungs clear to auscultation - no rales, rhonchi or wheezes  CV: regular rate and rhythm, normal S1 S2, no S3 or S4, no murmur, click or rub, no  peripheral edema and peripheral pulses strong  ABDOMEN: soft, nontender, no hepatosplenomegaly, no masses and bowel sounds normal  MS: no gross musculoskeletal defects noted, no edema  Diabetic foot exam: normal DP and PT pulses, no trophic changes or ulcerative lesions and normal sensory exam    Lab 12/28/2021 show normal electrolytes. Creatinine 0.93. Urine microalbumin is good.  Fasting blood sugar 170 and A1c 9.0.  Cholesterol 149, HDL 32, LDL 59 triglyceride 290.

## 2021-12-28 ENCOUNTER — LAB (OUTPATIENT)
Dept: LAB | Facility: CLINIC | Age: 46
End: 2021-12-28
Payer: COMMERCIAL

## 2021-12-28 DIAGNOSIS — E78.5 HYPERLIPIDEMIA LDL GOAL <100: ICD-10-CM

## 2021-12-28 DIAGNOSIS — E11.65 TYPE 2 DIABETES MELLITUS WITH HYPERGLYCEMIA, WITHOUT LONG-TERM CURRENT USE OF INSULIN (H): ICD-10-CM

## 2021-12-28 LAB
ANION GAP SERPL CALCULATED.3IONS-SCNC: 7 MMOL/L (ref 3–14)
BUN SERPL-MCNC: 13 MG/DL (ref 7–30)
CALCIUM SERPL-MCNC: 8.9 MG/DL (ref 8.5–10.1)
CHLORIDE BLD-SCNC: 102 MMOL/L (ref 94–109)
CHOLEST SERPL-MCNC: 149 MG/DL
CO2 SERPL-SCNC: 26 MMOL/L (ref 20–32)
CREAT SERPL-MCNC: 0.93 MG/DL (ref 0.66–1.25)
CREAT UR-MCNC: 112 MG/DL
FASTING STATUS PATIENT QL REPORTED: YES
GFR SERPL CREATININE-BSD FRML MDRD: >90 ML/MIN/1.73M2
GLUCOSE BLD-MCNC: 170 MG/DL (ref 70–99)
HBA1C MFR BLD: 9 % (ref 0–5.6)
HDLC SERPL-MCNC: 32 MG/DL
LDLC SERPL CALC-MCNC: 59 MG/DL
MICROALBUMIN UR-MCNC: 6 MG/L
MICROALBUMIN/CREAT UR: 5.36 MG/G CR (ref 0–17)
NONHDLC SERPL-MCNC: 117 MG/DL
POTASSIUM BLD-SCNC: 3.9 MMOL/L (ref 3.4–5.3)
SODIUM SERPL-SCNC: 135 MMOL/L (ref 133–144)
TRIGL SERPL-MCNC: 290 MG/DL

## 2021-12-28 PROCEDURE — 80061 LIPID PANEL: CPT

## 2021-12-28 PROCEDURE — 80048 BASIC METABOLIC PNL TOTAL CA: CPT

## 2021-12-28 PROCEDURE — 36415 COLL VENOUS BLD VENIPUNCTURE: CPT

## 2021-12-28 PROCEDURE — 83036 HEMOGLOBIN GLYCOSYLATED A1C: CPT

## 2021-12-28 PROCEDURE — 82043 UR ALBUMIN QUANTITATIVE: CPT

## 2021-12-30 ENCOUNTER — OFFICE VISIT (OUTPATIENT)
Dept: FAMILY MEDICINE | Facility: CLINIC | Age: 46
End: 2021-12-30
Payer: COMMERCIAL

## 2021-12-30 VITALS
OXYGEN SATURATION: 94 % | SYSTOLIC BLOOD PRESSURE: 122 MMHG | HEIGHT: 69 IN | HEART RATE: 89 BPM | WEIGHT: 208.38 LBS | BODY MASS INDEX: 30.86 KG/M2 | DIASTOLIC BLOOD PRESSURE: 84 MMHG | RESPIRATION RATE: 20 BRPM

## 2021-12-30 DIAGNOSIS — E11.65 TYPE 2 DIABETES MELLITUS WITH HYPERGLYCEMIA, WITHOUT LONG-TERM CURRENT USE OF INSULIN (H): ICD-10-CM

## 2021-12-30 DIAGNOSIS — Z79.4 TYPE 2 DIABETES MELLITUS WITH HYPERGLYCEMIA, WITH LONG-TERM CURRENT USE OF INSULIN (H): Primary | ICD-10-CM

## 2021-12-30 DIAGNOSIS — E66.811 OBESITY, CLASS I, BMI 30-34.9: ICD-10-CM

## 2021-12-30 DIAGNOSIS — Z23 HIGH PRIORITY FOR 2019-NCOV VACCINE: ICD-10-CM

## 2021-12-30 DIAGNOSIS — E11.65 TYPE 2 DIABETES MELLITUS WITH HYPERGLYCEMIA, WITH LONG-TERM CURRENT USE OF INSULIN (H): Primary | ICD-10-CM

## 2021-12-30 DIAGNOSIS — E78.5 HYPERLIPIDEMIA LDL GOAL <100: ICD-10-CM

## 2021-12-30 PROCEDURE — 99214 OFFICE O/P EST MOD 30 MIN: CPT | Performed by: INTERNAL MEDICINE

## 2021-12-30 PROCEDURE — 0064A COVID-19,PF,MODERNA (18+ YRS BOOSTER .25ML): CPT | Performed by: INTERNAL MEDICINE

## 2021-12-30 PROCEDURE — 91306 COVID-19,PF,MODERNA (18+ YRS BOOSTER .25ML): CPT | Performed by: INTERNAL MEDICINE

## 2021-12-30 RX ORDER — GLIPIZIDE 10 MG/1
TABLET, FILM COATED, EXTENDED RELEASE ORAL
Qty: 180 TABLET | Refills: 1 | Status: SHIPPED | OUTPATIENT
Start: 2021-12-30 | End: 2022-07-10

## 2021-12-30 RX ORDER — SIMVASTATIN 40 MG
TABLET ORAL
Qty: 90 TABLET | Refills: 1 | Status: SHIPPED | OUTPATIENT
Start: 2021-12-30 | End: 2022-07-06

## 2021-12-30 ASSESSMENT — MIFFLIN-ST. JEOR: SCORE: 1808.94

## 2022-01-16 ENCOUNTER — HEALTH MAINTENANCE LETTER (OUTPATIENT)
Age: 47
End: 2022-01-16

## 2022-01-29 NOTE — TELEPHONE ENCOUNTER
1120 Patient's wife noticed slurred speech and reported in to RN. Immediately did neuro exam (patient at Q1 hour neuro checks). Increased slurred speech, increased left side facial droop, slight increase in left hand weakness, new left deviation of tongue. Immediately notified Dr Gonda of neuro change. Stat CT scan ordered per Dr Gonda. Patient taken to CT scan.     1700 New neuro changes as before, slurred speech and facial droop. Dr Arechiga called and came to bedside. New orders received from Dr Arechiga.   Changed back to Invokana which was previously effective for him.    PSK

## 2022-01-31 DIAGNOSIS — E11.65 TYPE 2 DIABETES MELLITUS WITH HYPERGLYCEMIA, WITHOUT LONG-TERM CURRENT USE OF INSULIN (H): Primary | ICD-10-CM

## 2022-01-31 NOTE — TELEPHONE ENCOUNTER
Routing refill request to provider for review/approval because:  Drug not active on patient's medication list  Ely Lechuag RN

## 2022-02-01 NOTE — TELEPHONE ENCOUNTER
Please call patient re:  Plan by Dr. Bocanegra copied below - verify sugars on the 3 mg dose - was to increase to 7 mg pending his response and toleration.        From visit 12/30/21 - Dr. Bocanegra:    We will start semaglutide at 3 mg daily. Side effects discussed. After 30 days consider increasing it to 7 mg a day for additional 30 days followed by increase to 14 mg a day if he tolerates it well. Is asked to reach out by 30 days to prescribe the next dose. Follow-up with his primary provider  in May with A1c.

## 2022-02-01 NOTE — TELEPHONE ENCOUNTER
Attempted to contact patient to discuss how he is tolerating the medication. No answer, left message to contact us to review how it is working for him.    Genna Quesada RN [facility]

## 2022-02-10 NOTE — TELEPHONE ENCOUNTER
Attempted to contact patient again. Left message to return call to nurse [phone].    Genna Quesada RN [facility]

## 2022-02-11 NOTE — TELEPHONE ENCOUNTER
Left message on answering machine for patient to call back to [phone].    RN please give patient provider message as written.  Mikayla ARTISN, RN

## 2022-02-17 ENCOUNTER — TELEPHONE (OUTPATIENT)
Dept: FAMILY MEDICINE | Facility: CLINIC | Age: 47
End: 2022-02-17
Payer: COMMERCIAL

## 2022-02-17 NOTE — TELEPHONE ENCOUNTER
Semaglutide 3 MG TABS 30 tablet 0 12/30/2021 1/27/2022     Patient is requesting Rybelsus 3 MG Tabs 3

## 2022-02-17 NOTE — TELEPHONE ENCOUNTER
Medication not on medication list.   Routing to provider to advise.  Sanjana Hedrick BSN, RN

## 2022-02-18 NOTE — TELEPHONE ENCOUNTER
I could see that it was ordered on 12/30/21 by Dr. Bocanegra, Please inform patient to check with his pharmacy

## 2022-03-18 RX ORDER — ORAL SEMAGLUTIDE 3 MG/1
3 TABLET ORAL
Qty: 30 TABLET | Refills: 0 | Status: SHIPPED | OUTPATIENT
Start: 2022-03-18 | End: 2022-04-30

## 2022-04-28 DIAGNOSIS — E11.65 TYPE 2 DIABETES MELLITUS WITH HYPERGLYCEMIA, WITHOUT LONG-TERM CURRENT USE OF INSULIN (H): ICD-10-CM

## 2022-04-28 NOTE — TELEPHONE ENCOUNTER
Routing refill request to provider for review/approval because:  Labs not current:  a1c    Karely Chavarria RN, BSN  [facility]

## 2022-04-28 NOTE — TELEPHONE ENCOUNTER
Please call patient re:  Appointment -  He is scheduled for September.  If he is agreeable to sooner appointment schedule him anytime this month with lab/physical  If he doesn't want to come in for appointment till September can keep that appointment but needs lab appointment now for refills.  If he needs refill to get to lab appointment - send to me.  BHARTI

## 2022-04-29 NOTE — TELEPHONE ENCOUNTER
Routing refill request to provider for review/approval because:  Labs not current:  a1c    Appointments in Next Year      Sep 01, 2022  7:40 AM  (Arrive by 7:20 AM)  PHYSICAL with Steffanie Villa MD  [facility] ([facility] ) [phone]          Karely Chavarria RN, BSN  [facility]

## 2022-04-30 ENCOUNTER — TELEPHONE (OUTPATIENT)
Dept: FAMILY MEDICINE | Facility: CLINIC | Age: 47
End: 2022-04-30
Payer: COMMERCIAL

## 2022-04-30 DIAGNOSIS — E11.65 TYPE 2 DIABETES MELLITUS WITH HYPERGLYCEMIA, WITHOUT LONG-TERM CURRENT USE OF INSULIN (H): Primary | ICD-10-CM

## 2022-04-30 RX ORDER — ORAL SEMAGLUTIDE 3 MG/1
3 TABLET ORAL
Qty: 30 TABLET | Refills: 0 | Status: SHIPPED | OUTPATIENT
Start: 2022-04-30 | End: 2022-05-27

## 2022-04-30 NOTE — TELEPHONE ENCOUNTER
Referral is placed.  Message sent to patient for scheduling.  INRFOODK      Message -----       From:Jose Crews       Sent:4/28/2022  5:30 PM CDT         To:Patient Referral Message Message List    Subject:Referral Request    Jose Crews would like to request a referral.  Reason: Annual eye exam  Requested provider: Jed  Comment:  I have searched for Dr. Adkins to schedule my annual eye exam as I have previously been seen by him for these exams. I did not find him in the list of providers. I am looking to schedule my annual eye exam with a provider in the [place] or [place] area.

## 2022-05-24 DIAGNOSIS — E11.65 TYPE 2 DIABETES MELLITUS WITH HYPERGLYCEMIA, WITHOUT LONG-TERM CURRENT USE OF INSULIN (H): ICD-10-CM

## 2022-05-26 NOTE — TELEPHONE ENCOUNTER
Routing refill request to provider for review/approval because:  Labs not current:  A1C    Appointments in Next Year      Sep 01, 2022  7:40 AM  (Arrive by 7:20 AM)  PHYSICAL with Steffanie Villa MD  [facility] ([facility] ) [phone]

## 2022-05-27 RX ORDER — ORAL SEMAGLUTIDE 3 MG/1
TABLET ORAL
Qty: 30 TABLET | Refills: 0 | Status: SHIPPED | OUTPATIENT
Start: 2022-05-27 | End: 2022-08-04

## 2022-05-27 NOTE — TELEPHONE ENCOUNTER
Please call patient re:  Needs lab visit now.  Has appointment for in person visit in September but needs lab visit now.      Refill sent to get to lab appointment.     BHARTI

## 2022-07-02 ENCOUNTER — HEALTH MAINTENANCE LETTER (OUTPATIENT)
Age: 47
End: 2022-07-02

## 2022-07-06 DIAGNOSIS — E11.65 TYPE 2 DIABETES MELLITUS WITH HYPERGLYCEMIA, WITHOUT LONG-TERM CURRENT USE OF INSULIN (H): ICD-10-CM

## 2022-07-06 DIAGNOSIS — E78.5 HYPERLIPIDEMIA LDL GOAL <100: ICD-10-CM

## 2022-07-06 RX ORDER — GLIPIZIDE 10 MG/1
TABLET, FILM COATED, EXTENDED RELEASE ORAL
Qty: 180 TABLET | Refills: 1 | OUTPATIENT
Start: 2022-07-06

## 2022-07-06 RX ORDER — EMPAGLIFLOZIN 25 MG/1
TABLET, FILM COATED ORAL
Qty: 90 TABLET | Refills: 1 | OUTPATIENT
Start: 2022-07-06

## 2022-07-06 RX ORDER — SIMVASTATIN 40 MG
TABLET ORAL
Qty: 90 TABLET | Refills: 1 | Status: SHIPPED | OUTPATIENT
Start: 2022-07-06 | End: 2022-09-02

## 2022-07-10 RX ORDER — GLIPIZIDE 10 MG/1
20 TABLET, FILM COATED, EXTENDED RELEASE ORAL DAILY
Qty: 60 TABLET | Refills: 0 | Status: SHIPPED | OUTPATIENT
Start: 2022-07-10 | End: 2022-08-03

## 2022-07-16 DIAGNOSIS — E11.65 TYPE 2 DIABETES MELLITUS WITH HYPERGLYCEMIA, WITHOUT LONG-TERM CURRENT USE OF INSULIN (H): ICD-10-CM

## 2022-07-21 DIAGNOSIS — E11.65 TYPE 2 DIABETES MELLITUS WITH HYPERGLYCEMIA, WITHOUT LONG-TERM CURRENT USE OF INSULIN (H): ICD-10-CM

## 2022-07-21 RX ORDER — LANCETS
EACH MISCELLANEOUS
Qty: 100 EACH | Refills: 6 | Status: SHIPPED | OUTPATIENT
Start: 2022-07-21

## 2022-07-21 RX ORDER — BLOOD SUGAR DIAGNOSTIC
STRIP MISCELLANEOUS
Qty: 100 STRIP | Refills: 6 | Status: SHIPPED | OUTPATIENT
Start: 2022-07-21 | End: 2023-07-28

## 2022-07-21 RX ORDER — ISOPROPYL ALCOHOL 0.75 G/1
SWAB TOPICAL
Qty: 100 EACH | Refills: 3 | Status: SHIPPED | OUTPATIENT
Start: 2022-07-21 | End: 2023-05-14

## 2022-07-21 NOTE — TELEPHONE ENCOUNTER
blood glucose monitoring (NO BRAND SPECIFIED) meter device kit 1 kit 0 6/10/2021     One touch Ultra Meter

## 2022-08-03 DIAGNOSIS — E11.65 TYPE 2 DIABETES MELLITUS WITH HYPERGLYCEMIA, WITHOUT LONG-TERM CURRENT USE OF INSULIN (H): ICD-10-CM

## 2022-08-03 RX ORDER — EMPAGLIFLOZIN 25 MG/1
TABLET, FILM COATED ORAL
Qty: 30 TABLET | Refills: 0 | Status: SHIPPED | OUTPATIENT
Start: 2022-08-03 | End: 2022-09-02

## 2022-08-03 RX ORDER — GLIPIZIDE 10 MG/1
TABLET, FILM COATED, EXTENDED RELEASE ORAL
Qty: 60 TABLET | Refills: 0 | Status: SHIPPED | OUTPATIENT
Start: 2022-08-03 | End: 2022-09-02

## 2022-08-15 DIAGNOSIS — E11.65 TYPE 2 DIABETES MELLITUS WITH HYPERGLYCEMIA, WITHOUT LONG-TERM CURRENT USE OF INSULIN (H): ICD-10-CM

## 2022-08-15 RX ORDER — BLOOD-GLUCOSE METER
EACH MISCELLANEOUS
Qty: 1 KIT | Refills: 0 | Status: SHIPPED | OUTPATIENT
Start: 2022-08-15

## 2022-08-28 DIAGNOSIS — E11.65 TYPE 2 DIABETES MELLITUS WITH HYPERGLYCEMIA, WITHOUT LONG-TERM CURRENT USE OF INSULIN (H): ICD-10-CM

## 2022-09-01 ENCOUNTER — OFFICE VISIT (OUTPATIENT)
Dept: FAMILY MEDICINE | Facility: CLINIC | Age: 47
End: 2022-09-01
Payer: COMMERCIAL

## 2022-09-01 VITALS
BODY MASS INDEX: 31.37 KG/M2 | OXYGEN SATURATION: 95 % | HEIGHT: 69 IN | DIASTOLIC BLOOD PRESSURE: 79 MMHG | WEIGHT: 211.8 LBS | RESPIRATION RATE: 18 BRPM | HEART RATE: 85 BPM | TEMPERATURE: 98.1 F | SYSTOLIC BLOOD PRESSURE: 115 MMHG

## 2022-09-01 DIAGNOSIS — E11.65 TYPE 2 DIABETES MELLITUS WITH HYPERGLYCEMIA, WITHOUT LONG-TERM CURRENT USE OF INSULIN (H): ICD-10-CM

## 2022-09-01 DIAGNOSIS — Z12.11 SCREEN FOR COLON CANCER: ICD-10-CM

## 2022-09-01 DIAGNOSIS — L21.9 DERMATITIS, SEBORRHEIC: ICD-10-CM

## 2022-09-01 DIAGNOSIS — E78.5 HYPERLIPIDEMIA LDL GOAL <100: ICD-10-CM

## 2022-09-01 DIAGNOSIS — Z11.59 NEED FOR HEPATITIS C SCREENING TEST: ICD-10-CM

## 2022-09-01 DIAGNOSIS — Z00.00 ROUTINE GENERAL MEDICAL EXAMINATION AT A HEALTH CARE FACILITY: Primary | ICD-10-CM

## 2022-09-01 LAB
ALBUMIN SERPL-MCNC: 4.5 G/DL (ref 3.4–5)
ALP SERPL-CCNC: 98 U/L (ref 40–150)
ALT SERPL W P-5'-P-CCNC: 35 U/L (ref 0–70)
ANION GAP SERPL CALCULATED.3IONS-SCNC: 6 MMOL/L (ref 3–14)
AST SERPL W P-5'-P-CCNC: 11 U/L (ref 0–45)
BILIRUB SERPL-MCNC: 0.5 MG/DL (ref 0.2–1.3)
BUN SERPL-MCNC: 15 MG/DL (ref 7–30)
CALCIUM SERPL-MCNC: 9.3 MG/DL (ref 8.5–10.1)
CHLORIDE BLD-SCNC: 107 MMOL/L (ref 94–109)
CHOLEST SERPL-MCNC: 142 MG/DL
CO2 SERPL-SCNC: 27 MMOL/L (ref 20–32)
CREAT SERPL-MCNC: 0.93 MG/DL (ref 0.66–1.25)
CREAT UR-MCNC: 47 MG/DL
ERYTHROCYTE [DISTWIDTH] IN BLOOD BY AUTOMATED COUNT: 11.6 % (ref 10–15)
FASTING STATUS PATIENT QL REPORTED: NO
GFR SERPL CREATININE-BSD FRML MDRD: >90 ML/MIN/1.73M2
GLUCOSE BLD-MCNC: 245 MG/DL (ref 70–99)
HBA1C MFR BLD: 7.8 % (ref 0–5.6)
HCT VFR BLD AUTO: 49.3 % (ref 40–53)
HCV AB SERPL QL IA: NONREACTIVE
HDLC SERPL-MCNC: 29 MG/DL
HGB BLD-MCNC: 16.3 G/DL (ref 13.3–17.7)
LDLC SERPL CALC-MCNC: 54 MG/DL
MCH RBC QN AUTO: 31.1 PG (ref 26.5–33)
MCHC RBC AUTO-ENTMCNC: 33.1 G/DL (ref 31.5–36.5)
MCV RBC AUTO: 94 FL (ref 78–100)
MICROALBUMIN UR-MCNC: 6 MG/L
MICROALBUMIN/CREAT UR: 12.77 MG/G CR (ref 0–17)
NONHDLC SERPL-MCNC: 113 MG/DL
PLATELET # BLD AUTO: 192 10E3/UL (ref 150–450)
POTASSIUM BLD-SCNC: 4.7 MMOL/L (ref 3.4–5.3)
PROT SERPL-MCNC: 7.5 G/DL (ref 6.8–8.8)
RBC # BLD AUTO: 5.24 10E6/UL (ref 4.4–5.9)
SODIUM SERPL-SCNC: 140 MMOL/L (ref 133–144)
TRIGL SERPL-MCNC: 294 MG/DL
WBC # BLD AUTO: 6.9 10E3/UL (ref 4–11)

## 2022-09-01 PROCEDURE — 90715 TDAP VACCINE 7 YRS/> IM: CPT | Performed by: FAMILY MEDICINE

## 2022-09-01 PROCEDURE — 36415 COLL VENOUS BLD VENIPUNCTURE: CPT | Performed by: FAMILY MEDICINE

## 2022-09-01 PROCEDURE — 99396 PREV VISIT EST AGE 40-64: CPT | Mod: 25 | Performed by: FAMILY MEDICINE

## 2022-09-01 PROCEDURE — 90472 IMMUNIZATION ADMIN EACH ADD: CPT | Performed by: FAMILY MEDICINE

## 2022-09-01 PROCEDURE — 90471 IMMUNIZATION ADMIN: CPT | Performed by: FAMILY MEDICINE

## 2022-09-01 PROCEDURE — 80053 COMPREHEN METABOLIC PANEL: CPT | Performed by: FAMILY MEDICINE

## 2022-09-01 PROCEDURE — 80061 LIPID PANEL: CPT | Performed by: FAMILY MEDICINE

## 2022-09-01 PROCEDURE — 85027 COMPLETE CBC AUTOMATED: CPT | Performed by: FAMILY MEDICINE

## 2022-09-01 PROCEDURE — 90677 PCV20 VACCINE IM: CPT | Performed by: FAMILY MEDICINE

## 2022-09-01 PROCEDURE — 99207 PR FOOT EXAM NO CHARGE: CPT | Mod: 25 | Performed by: FAMILY MEDICINE

## 2022-09-01 PROCEDURE — 99213 OFFICE O/P EST LOW 20 MIN: CPT | Mod: 25 | Performed by: FAMILY MEDICINE

## 2022-09-01 PROCEDURE — 82043 UR ALBUMIN QUANTITATIVE: CPT | Performed by: FAMILY MEDICINE

## 2022-09-01 PROCEDURE — 83036 HEMOGLOBIN GLYCOSYLATED A1C: CPT | Performed by: FAMILY MEDICINE

## 2022-09-01 PROCEDURE — 86803 HEPATITIS C AB TEST: CPT | Performed by: FAMILY MEDICINE

## 2022-09-01 RX ORDER — KETOCONAZOLE 20 MG/ML
SHAMPOO TOPICAL
Qty: 120 ML | Refills: 7 | Status: SHIPPED | OUTPATIENT
Start: 2022-09-01 | End: 2023-10-20

## 2022-09-01 RX ORDER — GLIPIZIDE 10 MG/1
TABLET, FILM COATED, EXTENDED RELEASE ORAL
Qty: 60 TABLET | Refills: 0 | Status: CANCELLED | OUTPATIENT
Start: 2022-09-01

## 2022-09-01 ASSESSMENT — ENCOUNTER SYMPTOMS
HEARTBURN: 0
DIARRHEA: 0
FREQUENCY: 0
CONSTIPATION: 0
EYE PAIN: 0
DYSURIA: 0
NERVOUS/ANXIOUS: 0
COUGH: 0
SHORTNESS OF BREATH: 0
DIZZINESS: 0
PALPITATIONS: 0
HEMATOCHEZIA: 0
SORE THROAT: 0
JOINT SWELLING: 0
PARESTHESIAS: 0
HEADACHES: 0
HEMATURIA: 0
CHILLS: 0
WEAKNESS: 0
FEVER: 0
NAUSEA: 0
ABDOMINAL PAIN: 0
ARTHRALGIAS: 0
MYALGIAS: 0

## 2022-09-01 ASSESSMENT — PAIN SCALES - GENERAL: PAINLEVEL: NO PAIN (0)

## 2022-09-01 NOTE — PROGRESS NOTES
SUBJECTIVE:   CC: Jose Crews is an 47 year old male who presents for preventative health visit.     {  Patient has been advised of split billing requirements and indicates understanding: Yes  Healthy Habits:     Getting at least 3 servings of Calcium per day:  Yes    Bi-annual eye exam:  Yes    Dental care twice a year:  Yes    Sleep apnea or symptoms of sleep apnea:  None    Diet:  Diabetic    Frequency of exercise:  1 day/week    Duration of exercise:  15-30 minutes    Taking medications regularly:  Yes    Medication side effects:  None    PHQ-2 Total Score: 0    Additional concerns today:  No    Walking for exercise.        Diabetes Follow-up    How often are you checking your blood sugar? A few times a week   140 average  What time of day are you checking your blood sugars (select all that apply)?  Before meals  Have you had any blood sugars above 200?  No  Have you had any blood sugars below 70?  No    What symptoms do you notice when your blood sugar is low?  None    What concerns do you have today about your diabetes? None     Do you have any of these symptoms? (Select all that apply)  No numbness or tingling in feet.  No redness, sores or blisters on feet.  No complaints of excessive thirst.  No reports of blurry vision.  No significant changes to weight.    Have you had a diabetic eye exam in the last 12 months? No        BP Readings from Last 2 Encounters:   09/01/22 115/79   12/30/21 122/84     Hemoglobin A1C (%)   Date Value   12/28/2021 9.0 (H)   06/07/2021 11.9 (H)   10/26/2020 9.4 (H)     LDL Cholesterol Calculated (mg/dL)   Date Value   12/28/2021 59   03/09/2020 94   07/05/2019 89         Hyperlipidemia Follow-Up      Are you regularly taking any medication or supplement to lower your cholesterol?   Yes- Simvastatin    Are you having muscle aches or other side effects that you think could be caused by your cholesterol lowering medication?  No      Today's PHQ-2 Score:   PHQ-2 ( 1999 [employer])  2022   Q1: Little interest or pleasure in doing things 0   Q2: Feeling down, depressed or hopeless 0   PHQ-2 Score 0   PHQ-2 Total Score (12-17 Years)- Positive if 3 or more points; Administer PHQ-A if positive -   Q1: Little interest or pleasure in doing things Not at all   Q2: Feeling down, depressed or hopeless Not at all   PHQ-2 Score 0       Abuse: Current or Past(Physical, Sexual or Emotional)- No  Do you feel safe in your environment? Yes    Have you ever done Advance Care Planning? (For example, a Health Directive, POLST, or a discussion with a medical provider or your loved ones about your wishes): Yes, patient states has an Advance Care Planning document and will bring a copy to the clinic.    Social History     Tobacco Use     Smoking status: Former Smoker     Quit date: 2000     Years since quittin.6     Smokeless tobacco: Never Used   Substance Use Topics     Alcohol use: Yes     Comment: occassionally     If you drink alcohol do you typically have >3 drinks per day or >7 drinks per week? No    Alcohol Use 2022   Prescreen: >3 drinks/day or >7 drinks/week? No   Prescreen: >3 drinks/day or >7 drinks/week? -   No flowsheet data found.    Last PSA: No results found for: PSA    Reviewed orders with patient. Reviewed health maintenance and updated orders accordingly - Yes  BP Readings from Last 3 Encounters:   22 115/79   21 122/84   20 126/86    Wt Readings from Last 3 Encounters:   22 96.1 kg (211 lb 12.8 oz)   21 94.5 kg (208 lb 6 oz)   20 98.9 kg (218 lb)                    Reviewed and updated as needed this visit by clinical staff   Tobacco  Allergies  Meds   Med Hx  Surg Hx  Fam Hx  Soc Hx          Reviewed and updated as needed this visit by Provider   Tobacco  Allergies    Med Hx  Surg Hx  Fam Hx  Soc Hx         Past Medical History:   Diagnosis Date     Acne vulgaris      Balanitis 2009     History of atypical/dysplastic nevus  "03/2010    back with positive margins.     MEDICAL HISTORY OF - age 12    hospitalized for a week with unexplained abdominal pain and vomiting     Type II or unspecified type diabetes mellitus without mention of complication, not stated as uncontrolled       Past Surgical History:   Procedure Laterality Date     HC TOOTH EXTRACTION W/FORCEP  2006       Review of Systems   Constitutional: Negative for chills and fever.   HENT: Negative for congestion, ear pain, hearing loss and sore throat.    Eyes: Negative for pain and visual disturbance.   Respiratory: Negative for cough and shortness of breath.    Cardiovascular: Negative for chest pain, palpitations and peripheral edema.   Gastrointestinal: Negative for abdominal pain, constipation, diarrhea, heartburn, hematochezia and nausea.   Genitourinary: Negative for dysuria, frequency, genital sores, hematuria, impotence, penile discharge and urgency.   Musculoskeletal: Negative for arthralgias, joint swelling and myalgias.   Skin: Negative for rash.   Neurological: Negative for dizziness, weakness, headaches and paresthesias.   Psychiatric/Behavioral: Negative for mood changes. The patient is not nervous/anxious.          OBJECTIVE:   /79 (BP Location: Right arm, Patient Position: Sitting, Cuff Size: Adult Large)   Pulse 85   Temp 98.1  F (36.7  C) (Oral)   Resp 18   Ht 1.74 m (5' 8.5\")   Wt 96.1 kg (211 lb 12.8 oz)   SpO2 95%   BMI 31.74 kg/m      Physical Exam  GENERAL: alert, no distress and obese  EYES: Eyes grossly normal to inspection, PERRL and conjunctivae and sclerae normal  HENT: ear canals and TM's normal, nose and mouth without ulcers or lesions  NECK: no adenopathy, no asymmetry, masses, or scars and thyroid normal to palpation  RESP: lungs clear to auscultation - no rales, rhonchi or wheezes  CV: regular rate and rhythm, normal S1 S2, no S3 or S4, no murmur, click or rub, no peripheral edema and peripheral pulses strong  ABDOMEN: soft, " nontender, no hepatosplenomegaly, no masses and bowel sounds normal   (male): normal male genitalia without lesions or urethral discharge, no hernia  MS: no gross musculoskeletal defects noted, no edema  SKIN: no suspicious lesions or rashes  NEURO: Normal strength and tone, mentation intact and speech normal  PSYCH: mentation appears normal, affect normal/bright  Diabetic foot exam: normal DP and PT pulses, no trophic changes or ulcerative lesions, normal sensory exam and normal monofilament exam    Diagnostic Test Results:  Labs reviewed in Epic  Results for orders placed or performed in visit on 09/01/22   Hepatitis C Screen Reflex to HCV RNA Quant and Genotype     Status: Normal   Result Value Ref Range    Hepatitis C Antibody Nonreactive Nonreactive    Narrative    Assay performance characteristics have not been established for newborns, infants, and children.   Hemoglobin A1c     Status: Abnormal   Result Value Ref Range    Hemoglobin A1C 7.8 (H) 0.0 - 5.6 %   Comprehensive metabolic panel (BMP + Alb, Alk Phos, ALT, AST, Total. Bili, TP)     Status: Abnormal   Result Value Ref Range    Sodium 140 133 - 144 mmol/L    Potassium 4.7 3.4 - 5.3 mmol/L    Chloride 107 94 - 109 mmol/L    Carbon Dioxide (CO2) 27 20 - 32 mmol/L    Anion Gap 6 3 - 14 mmol/L    Urea Nitrogen 15 7 - 30 mg/dL    Creatinine 0.93 0.66 - 1.25 mg/dL    Calcium 9.3 8.5 - 10.1 mg/dL    Glucose 245 (H) 70 - 99 mg/dL    Alkaline Phosphatase 98 40 - 150 U/L    AST 11 0 - 45 U/L    ALT 35 0 - 70 U/L    Protein Total 7.5 6.8 - 8.8 g/dL    Albumin 4.5 3.4 - 5.0 g/dL    Bilirubin Total 0.5 0.2 - 1.3 mg/dL    GFR Estimate >90 >60 mL/min/1.73m2   CBC with platelets     Status: Normal   Result Value Ref Range    WBC Count 6.9 4.0 - 11.0 10e3/uL    RBC Count 5.24 4.40 - 5.90 10e6/uL    Hemoglobin 16.3 13.3 - 17.7 g/dL    Hematocrit 49.3 40.0 - 53.0 %    MCV 94 78 - 100 fL    MCH 31.1 26.5 - 33.0 pg    MCHC 33.1 31.5 - 36.5 g/dL    RDW 11.6 10.0 - 15.0 %     Platelet Count 192 150 - 450 10e3/uL   Lipid panel reflex to direct LDL Non-fasting     Status: Abnormal   Result Value Ref Range    Cholesterol 142 <200 mg/dL    Triglycerides 294 (H) <150 mg/dL    Direct Measure HDL 29 (L) >=40 mg/dL    LDL Cholesterol Calculated 54 <=100 mg/dL    Non HDL Cholesterol 113 <130 mg/dL    Patient Fasting > 8hrs? No     Narrative    Cholesterol  Desirable:  <200 mg/dL    Triglycerides  Normal:  Less than 150 mg/dL  Borderline High:  150-199 mg/dL  High:  200-499 mg/dL  Very High:  Greater than or equal to 500 mg/dL    Direct Measure HDL  Female:  Greater than or equal to 50 mg/dL   Male:  Greater than or equal to 40 mg/dL    LDL Cholesterol  Desirable:  <100mg/dL  Above Desirable:  100-129 mg/dL   Borderline High:  130-159 mg/dL   High:  160-189 mg/dL   Very High:  >= 190 mg/dL    Non HDL Cholesterol  Desirable:  130 mg/dL  Above Desirable:  130-159 mg/dL  Borderline High:  160-189 mg/dL  High:  190-219 mg/dL  Very High:  Greater than or equal to 220 mg/dL   Albumin Random Urine Quantitative with Creat Ratio     Status: None   Result Value Ref Range    Creatinine Urine mg/dL 47 mg/dL    Albumin Urine mg/L 6 mg/L    Albumin Urine mg/g Cr 12.77 0.00 - 17.00 mg/g Cr       ASSESSMENT/PLAN:   (Z00.00) Routine general medical examination at a health care facility  (primary encounter diagnosis)  Comment: Nonfasting  Plan: Comprehensive metabolic panel (BMP + Alb, Alk         Phos, ALT, AST, Total. Bili, TP), CBC with         platelets        Screening and preventive care discussed    (Z12.11) Screen for colon cancer  Comment: Options for colon cancer screening discussed.  He is reticent to do a full colonoscopy now but is interested in stool testing and will pursue colonoscopy when follow-up to  Plan: COLOGUARD([employer])        Order placed for Cologuard testing which he will complete as noted above.    (E11.65) Type 2 diabetes mellitus with hyperglycemia, without long-term current  use of insulin (H)  Comment: Significant improvement in diabetic control, acceptable control currently  Plan: Adult Eye  Referral, Hemoglobin A1c,         Comprehensive metabolic panel (BMP + Alb, Alk         Phos, ALT, AST, Total. Bili, TP), CBC with         platelets, Albumin Random Urine Quantitative         with Creat Ratio, Semaglutide (RYBELSUS) 3 MG         TABS, metFORMIN (GLUCOPHAGE) 1000 MG tablet,         empagliflozin (JARDIANCE) 25 MG TABS tablet,         glipiZIDE (GLUCOTROL XL) 10 MG 24 hr tablet,         FOOT EXAM        Diet, exercise, medication as previous.  Follow-up in 6 months.  We could consider an increase in the Rybelsus dosing in the future if needed to optimally control his blood sugar.    (Z11.59) Need for hepatitis C screening test  Comment: Low risk  Plan: Hepatitis C Screen Reflex to HCV RNA Quant and         Genotype        Negative test    (L21.9) Dermatitis, seborrheic  Comment: Scalp irritation, treated with Nizoral shampoo previously  Plan: ketoconazole (NIZORAL) 2 % external shampoo        Refill given    (E78.5) Hyperlipidemia LDL goal <100  Comment: Good control cholesterol, elevated triglycerides but nonfasting  Plan: Comprehensive metabolic panel (BMP + Alb, Alk         Phos, ALT, AST, Total. Bili, TP), Lipid panel         reflex to direct LDL Non-fasting, simvastatin         (ZOCOR) 40 MG tablet        Continue simvastatin as previous      Patient has been advised of split billing requirements and indicates understanding: Yes    COUNSELING:   Reviewed preventive health counseling, as reflected in patient instructions       Regular exercise       Healthy diet/nutrition       Vision screening       Immunizations    Vaccinated for: Pneumococcal and TDAP and Declined: Hepatitis B due to Other consider at a later date               Consider Hep C screening for all patients one time for ages 18-79 years       Colorectal cancer screening       Osteoporosis prevention/bone  "health    Estimated body mass index is 31.74 kg/m  as calculated from the following:    Height as of this encounter: 1.74 m (5' 8.5\").    Weight as of this encounter: 96.1 kg (211 lb 12.8 oz).     Weight management plan: Discussed healthy diet and exercise guidelines  Wt Readings from Last 5 Encounters:   09/01/22 96.1 kg (211 lb 12.8 oz)   12/30/21 94.5 kg (208 lb 6 oz)   11/02/20 98.9 kg (218 lb)   06/13/19 99.1 kg (218 lb 8 oz)   08/13/18 101.2 kg (223 lb)       He reports that he quit smoking about 22 years ago. He has never used smokeless tobacco.      Counseling Resources:  ATP IV Guidelines  Pooled Cohorts Equation Calculator  FRAX Risk Assessment  ICSI Preventive Guidelines  Dietary Guidelines for Americans, 2010  USDA's MyPlate  ASA Prophylaxis  Lung CA Screening    Steffanie Villa MD  [facility]      Patient Instructions   Labs today  I will update scripts when the results return.  Booster Tetanus and Pneumonia vaccines.    Refill nizoral shampoo for use as needed for scalp.       For the eye appointment -  You can call [phone] to schedule this at the ealth building in [place]             This chart was documented by provider using a voice activated software called Dragon in addition to manual typing. There may be vocabulary errors or other grammatical errors due to this.     "

## 2022-09-01 NOTE — PATIENT INSTRUCTIONS
Labs today  I will update scripts when the results return.  Booster Tetanus and Pneumonia vaccines.    Refill nizoral shampoo for use as needed for scalp.       For the eye appointment -  You can call [phone] to schedule this at the [facility] building in [place]           Preventive Health Recommendations  Male Ages 40 to 49    Yearly exam:             See your health care provider every year in order to  o   Review health changes.   o   Discuss preventive care.    o   Review your medicines if your doctor has prescribed any.  You should be tested each year for STDs (sexually transmitted diseases) if you re at risk.   Have a cholesterol test every 5 years.   Have a colonoscopy (test for colon cancer) if someone in your family has had colon cancer or polyps before age 50.   After age 45, have a diabetes test (fasting glucose). If you are at risk for diabetes, you should have this test every 3 years.    Talk with your health care provider about whether or not a prostate cancer screening test (PSA) is right for you.    Shots: Get a flu shot each year. Get a tetanus shot every 10 years.     Nutrition:  Eat at least 5 servings of fruits and vegetables daily.   Eat whole-grain bread, whole-wheat pasta and brown rice instead of white grains and rice.   Get adequate Calcium and Vitamin D.     Lifestyle  Exercise for at least 150 minutes a week (30 minutes a day, 5 days a week). This will help you control your weight and prevent disease.   Limit alcohol to one drink per day.   No smoking.   Wear sunscreen to prevent skin cancer.   See your dentist every six months for an exam and cleaning.

## 2022-09-02 RX ORDER — ORAL SEMAGLUTIDE 3 MG/1
1 TABLET ORAL DAILY
Qty: 90 TABLET | Refills: 1 | Status: SHIPPED | OUTPATIENT
Start: 2022-09-02 | End: 2023-02-21

## 2022-09-02 RX ORDER — SIMVASTATIN 40 MG
40 TABLET ORAL AT BEDTIME
Qty: 90 TABLET | Refills: 3 | Status: SHIPPED | OUTPATIENT
Start: 2022-09-02 | End: 2023-08-08

## 2022-09-02 RX ORDER — GLIPIZIDE 10 MG/1
20 TABLET, FILM COATED, EXTENDED RELEASE ORAL DAILY
Qty: 180 TABLET | Refills: 1 | Status: SHIPPED | OUTPATIENT
Start: 2022-09-02 | End: 2023-02-21

## 2022-09-02 NOTE — RESULT ENCOUNTER NOTE
Hepatitis C screening is negative/normal.  Your urine testing is normal.  There is not elevated protein present.  Your cholesterol levels indicate good control with your simvastatin.  The triglyceride level is elevated but that is related to the blood sugar and not being fasting for your appointment.  Your kidney function and liver testing are all normal.  Your long-term blood sugar testing is in the acceptable range of less than 8%.  It is much improved from previous testing.  Ideal range for blood sugar would be less than 7% and I would recommend you continue your current medication as well as dietary measures to further improve the blood sugar readings.  Recheck of this in 6 months is recommended.  Your blood cell counts are normal.  Please call or MyChart message me if you have any questions.      BHARTI

## 2022-09-03 RX ORDER — GLIPIZIDE 10 MG/1
TABLET, FILM COATED, EXTENDED RELEASE ORAL
Qty: 60 TABLET | Refills: 0 | OUTPATIENT
Start: 2022-09-03

## 2022-09-03 RX ORDER — EMPAGLIFLOZIN 25 MG/1
TABLET, FILM COATED ORAL
Qty: 30 TABLET | Refills: 0 | OUTPATIENT
Start: 2022-09-03

## 2022-09-27 LAB — NONINV COLON CA DNA+OCC BLD SCRN STL QL: NORMAL

## 2022-09-28 NOTE — RESULT ENCOUNTER NOTE
The cologuard screening test is negative.  Repeat testing in 3 years is recommended for colon cancer screening.  Please follow-up sooner if bowel changes occur.  PSK

## 2022-10-17 ENCOUNTER — OFFICE VISIT (OUTPATIENT)
Dept: OPHTHALMOLOGY | Facility: CLINIC | Age: 47
End: 2022-10-17
Attending: FAMILY MEDICINE
Payer: COMMERCIAL

## 2022-10-17 DIAGNOSIS — H52.203 MYOPIA OF BOTH EYES WITH ASTIGMATISM AND PRESBYOPIA: ICD-10-CM

## 2022-10-17 DIAGNOSIS — E11.65 TYPE 2 DIABETES MELLITUS WITH HYPERGLYCEMIA, WITHOUT LONG-TERM CURRENT USE OF INSULIN (H): Primary | ICD-10-CM

## 2022-10-17 DIAGNOSIS — H40.053 BILATERAL OCULAR HYPERTENSION: ICD-10-CM

## 2022-10-17 DIAGNOSIS — H52.13 MYOPIA OF BOTH EYES WITH ASTIGMATISM AND PRESBYOPIA: ICD-10-CM

## 2022-10-17 DIAGNOSIS — E11.9 TYPE 2 DIABETES MELLITUS WITHOUT RETINOPATHY (H): ICD-10-CM

## 2022-10-17 DIAGNOSIS — H52.4 MYOPIA OF BOTH EYES WITH ASTIGMATISM AND PRESBYOPIA: ICD-10-CM

## 2022-10-17 PROCEDURE — 92004 COMPRE OPH EXAM NEW PT 1/>: CPT | Performed by: OPHTHALMOLOGY

## 2022-10-17 PROCEDURE — 92015 DETERMINE REFRACTIVE STATE: CPT | Performed by: OPHTHALMOLOGY

## 2022-10-17 ASSESSMENT — REFRACTION_MANIFEST
OD_ADD: +1.25
OS_CYLINDER: +0.25
OD_AXIS: 010
OS_SPHERE: -0.50
OD_SPHERE: -0.50
OD_CYLINDER: +0.50
OS_AXIS: 015
OS_ADD: +1.25

## 2022-10-17 ASSESSMENT — SLIT LAMP EXAM - LIDS
COMMENTS: NORMAL
COMMENTS: NORMAL

## 2022-10-17 ASSESSMENT — CONF VISUAL FIELD
OD_SUPERIOR_NASAL_RESTRICTION: 0
OD_INFERIOR_NASAL_RESTRICTION: 0
OD_NORMAL: 1
METHOD: COUNTING FINGERS
OS_NORMAL: 1
OS_INFERIOR_TEMPORAL_RESTRICTION: 0
OS_SUPERIOR_TEMPORAL_RESTRICTION: 0
OS_INFERIOR_NASAL_RESTRICTION: 0
OS_SUPERIOR_NASAL_RESTRICTION: 0
OD_INFERIOR_TEMPORAL_RESTRICTION: 0
OD_SUPERIOR_TEMPORAL_RESTRICTION: 0

## 2022-10-17 ASSESSMENT — TONOMETRY
OD_IOP_MMHG: 22
OD_IOP_MMHG: 26
IOP_METHOD: TONOPEN
OS_IOP_MMHG: 26
IOP_METHOD: ICARE
OS_IOP_MMHG: 21

## 2022-10-17 ASSESSMENT — EXTERNAL EXAM - RIGHT EYE: OD_EXAM: NORMAL

## 2022-10-17 ASSESSMENT — CUP TO DISC RATIO
OD_RATIO: 0.3
OS_RATIO: 0.3

## 2022-10-17 ASSESSMENT — VISUAL ACUITY
OS_SC+: -1
OS_SC: 20/20
METHOD: SNELLEN - LINEAR
OD_SC: 20/25

## 2022-10-17 ASSESSMENT — EXTERNAL EXAM - LEFT EYE: OS_EXAM: NORMAL

## 2022-10-17 NOTE — PROGRESS NOTES
HPI     Diabetic Eye Exam    Diabetes characteristics include Type 2.           Comments    Patient here for diabetic eye exam. Last exam was 2019. No problems/concerns. Does not wear glasses or ctls. No drop use.    Does a lot of computer work, no vision concerns for that distance. Does not use readers for near at this time.          Last edited by Marleen Plummer COA on 10/17/2022  2:23 PM.         Review of systems for the eyes was negative other than the pertinent positives/negatives listed in the HPI.      Assessment & Plan    HPI:  Jose Crews is a 47 year old male with history of T2DM, HLD, ocular hypertension presents for annual dilated fundus exam. Denies visual complaints. No redness, tearing, flashes or floaters          POHx: none  PMHx: T2DM, HLD  Current Medications: ACCU-CHEK GUIDE test strip, USE TO TEST BLOOD SUGAR ONCE DAILY OR AS DIRECTED  alcohol swab prep pads, Use to swab area of injection/mirna as directed.  Alcohol Swabs (B-D SINGLE USE SWABS REGULAR) PADS, USE TO SWAB AREA OF INJECTION/MIRNA AS DIRECTED  ARB NOT PRESCRIBED, INTENTIONAL,, by Other route continuous prn.  aspirin 81 MG tablet, Take 1 tablet by mouth daily. *  blood glucose calibration (NO BRAND SPECIFIED) solution, To accompany: Blood Glucose Monitor Brands: per insurance.  blood glucose monitoring (SOFTCLIX) lancets, USE WITH LANCING DEVICE TO TEST BLOOD SUGARS ONCE DAILY  Blood Glucose Monitoring Suppl (ONETOUCH VERIO FLEX SYSTEM) w/Device KIT, USE TO TEST BLOOD SUGAR ONCE DAILY OR AS DIRECTED  empagliflozin (JARDIANCE) 25 MG TABS tablet, Take 1 tablet (25 mg) by mouth daily  fluocinonide (LIDEX) 0.05 % external solution, Apply once daily as needed for scalp itch or flaking. Use sparingly.  glipiZIDE (GLUCOTROL XL) 10 MG 24 hr tablet, Take 2 tablets (20 mg) by mouth daily  ketoconazole (NIZORAL) 2 % external cream, Apply topically 2 times daily (Patient taking differently: Apply topically as needed)  ketoconazole  (NIZORAL) 2 % external shampoo, Apply to the affected area and wash off after 3-5 minutes, use three times weekly  metFORMIN (GLUCOPHAGE) 1000 MG tablet, Take 1 tablet (1,000 mg) by mouth 2 times daily (with meals)  Multiple Vitamin (DAILY MULTIVITAMIN PO), Take 1 tablet by mouth daily.  Semaglutide (RYBELSUS) 3 MG TABS, Take 1 tablet by mouth daily  simvastatin (ZOCOR) 40 MG tablet, Take 1 tablet (40 mg) by mouth At Bedtime    No current facility-administered medications on file prior to visit.    FHx: neda-  PSHx: denies history of ocular surgeries       Current Eye Medications:  None    Assessment & Plan:  (E11.65) Type 2 diabetes mellitus with hyperglycemia, without long-term current use of insulin (H)  (primary encounter diagnosis)  (E11.9) Type 2 diabetes mellitus without retinopathy (H)  Diagnosed ~2008  Most recent HgBA1c 7.8 on 9/1/22  No background diabetic retinopathy or neovascularization noted on today's exam.  Discussed ocular and systemic benefits of blood pressure and blood sugar control.  Return in 1 year for full exam with dilation or sooner if changes to vision.        (H52.13,  H52.203,  H52.4) Myopia of both eyes with astigmatism and presbyopia  Mild myopia,no need for glasses currently    (H40.053) Bilateral ocular hypertension  No signs of glaucoma on exaam. Continue observation for now      Return in about 1 year (around 10/17/2023) for Annual Visit.        Lucas He MD     Attending Physician Attestation:  Complete documentation of historical and exam elements from today's encounter can be found in the full encounter summary report (not reduplicated in this progress note).  I personally obtained the chief complaint(s) and history of present illness.  I confirmed and edited as necessary the review of systems, past medical/surgical history, family history, social history, and examination findings as documented by others; and I examined the patient myself.  I personally reviewed the  relevant tests, images, and reports as documented above.  I formulated and edited as necessary the assessment and plan and discussed the findings and management plan with the patient and family. - Lucas He MD

## 2022-10-17 NOTE — NURSING NOTE
Chief Complaints and History of Present Illnesses   Patient presents with     Diabetic Eye Exam       Chief Complaint(s) and History of Present Illness(es)     Diabetic Eye Exam            Diabetes Type: Type 2          Comments    Patient here for diabetic eye exam. Last exam was 2019. No problems/concerns. Does not wear glasses or ctls. No drop use.    Does a lot of computer work, no vision concerns for that distance. Does not use readers for near at this time.                 Marleen Plummer, COA

## 2022-10-19 LAB — NONINV COLON CA DNA+OCC BLD SCRN STL QL: NEGATIVE

## 2023-04-09 ENCOUNTER — MYC REFILL (OUTPATIENT)
Dept: FAMILY MEDICINE | Facility: CLINIC | Age: 48
End: 2023-04-09
Payer: COMMERCIAL

## 2023-04-09 DIAGNOSIS — E11.65 TYPE 2 DIABETES MELLITUS WITH HYPERGLYCEMIA, WITHOUT LONG-TERM CURRENT USE OF INSULIN (H): ICD-10-CM

## 2023-04-10 ENCOUNTER — LAB (OUTPATIENT)
Dept: LAB | Facility: CLINIC | Age: 48
End: 2023-04-10
Payer: COMMERCIAL

## 2023-04-10 DIAGNOSIS — E11.65 TYPE 2 DIABETES MELLITUS WITH HYPERGLYCEMIA, WITHOUT LONG-TERM CURRENT USE OF INSULIN (H): Primary | ICD-10-CM

## 2023-04-10 LAB
HBA1C MFR BLD: 8.6 % (ref 0–5.6)
HOLD SPECIMEN: NORMAL

## 2023-04-10 PROCEDURE — 83036 HEMOGLOBIN GLYCOSYLATED A1C: CPT

## 2023-04-10 PROCEDURE — 36415 COLL VENOUS BLD VENIPUNCTURE: CPT

## 2023-04-11 RX ORDER — GLIPIZIDE 10 MG/1
20 TABLET, FILM COATED, EXTENDED RELEASE ORAL DAILY
Qty: 180 TABLET | Refills: 0 | Status: SHIPPED | OUTPATIENT
Start: 2023-04-11 | End: 2023-06-28

## 2023-04-13 NOTE — RESULT ENCOUNTER NOTE
Please call patient re:  results A1C - recommendations as noted.        Your long term blood sugar testing is higher than previous.  If you have been taking the Rybelsus regularly, I would recommend an increase in dose to 7 mg for 1 month then 14 mg daily.  Alternatively, we have the option of using an injectable medication for blood sugar control and weight loss.  We will contact you regarding this.   Please call or [employer] message me if you have any questions.      FLYK

## 2023-04-14 ENCOUNTER — TELEPHONE (OUTPATIENT)
Dept: FAMILY MEDICINE | Facility: CLINIC | Age: 48
End: 2023-04-14
Payer: COMMERCIAL

## 2023-04-14 DIAGNOSIS — E11.65 TYPE 2 DIABETES MELLITUS WITH HYPERGLYCEMIA, WITHOUT LONG-TERM CURRENT USE OF INSULIN (H): ICD-10-CM

## 2023-04-14 RX ORDER — ORAL SEMAGLUTIDE 3 MG/1
TABLET ORAL
Qty: 90 TABLET | Refills: 0 | Status: SHIPPED | OUTPATIENT
Start: 2023-04-14 | End: 2023-06-28

## 2023-04-14 NOTE — TELEPHONE ENCOUNTER
"Patient is returning a call.    Patient updated on the below.      \"I have not been taking Ryebelsus regularly.  It was not refilled with other medications.\"    Patient requesting a refill of Ryebelsus.     Patient was transferred to  to schedule a physical.        Fariba Hollis RN    "

## 2023-04-14 NOTE — TELEPHONE ENCOUNTER
This writer attempted to contact patient on 04/14/23.    Reason for call provider's message and unable to leave message due to mailbox is full.     Sent patient MyChart message.     If patient calls back:   Relay message below, document that pt called and close encounter.     LEATHA Emery  [facility]      ----- Message -----  From: Steffanie Villa MD  Sent: 4/13/2023   7:05 AM CDT  To: [facility]    Please call patient re:  results A1C - recommendations as noted.        Your long term blood sugar testing is higher than previous.  If you have been taking the Rybelsus regularly, I would recommend an increase in dose to 7 mg for 1 month then 14 mg daily.  Alternatively, we have the option of using an injectable medication for blood sugar control and weight loss.  We will contact you regarding this.   Please call or MyChart message me if you have any questions.      BHARTI

## 2023-04-14 NOTE — TELEPHONE ENCOUNTER
Refill rybelsus now.  Last sent for 90 days in February.  ?formulary issue if he did not get the script.  Will await message from pharmacy if not covered.  Adjustment of dose planned with follow up appointment for wellness exam.    BHARTI

## 2023-04-23 ENCOUNTER — HEALTH MAINTENANCE LETTER (OUTPATIENT)
Age: 48
End: 2023-04-23

## 2023-05-04 ENCOUNTER — TELEPHONE (OUTPATIENT)
Dept: FAMILY MEDICINE | Facility: CLINIC | Age: 48
End: 2023-05-04
Payer: COMMERCIAL

## 2023-05-04 NOTE — TELEPHONE ENCOUNTER
Patient Quality Outreach    Patient is due for the following:   Physical Preventive Adult Physical      Topic Date Due     Hepatitis B Vaccine (1 of 3 - 3-dose series) Never done     Flu Vaccine (1) 09/01/2022       Next Steps:   schedule annual wellness visit    Type of outreach:    Sent [employer] message.      Questions for provider review:    None     Leilani Moya MA

## 2023-05-13 DIAGNOSIS — E11.65 TYPE 2 DIABETES MELLITUS WITH HYPERGLYCEMIA, WITHOUT LONG-TERM CURRENT USE OF INSULIN (H): ICD-10-CM

## 2023-05-14 RX ORDER — ISOPROPYL ALCOHOL 0.75 G/1
SWAB TOPICAL
Qty: 100 EACH | Refills: 3 | Status: SHIPPED | OUTPATIENT
Start: 2023-05-14

## 2023-06-27 DIAGNOSIS — E11.65 TYPE 2 DIABETES MELLITUS WITH HYPERGLYCEMIA, WITHOUT LONG-TERM CURRENT USE OF INSULIN (H): ICD-10-CM

## 2023-06-28 RX ORDER — GLIPIZIDE 10 MG/1
TABLET, FILM COATED, EXTENDED RELEASE ORAL
Qty: 180 TABLET | Refills: 0 | Status: SHIPPED | OUTPATIENT
Start: 2023-06-28 | End: 2023-08-03

## 2023-06-28 RX ORDER — ORAL SEMAGLUTIDE 3 MG/1
TABLET ORAL
Qty: 90 TABLET | Refills: 0 | Status: SHIPPED | OUTPATIENT
Start: 2023-06-28 | End: 2023-08-03

## 2023-06-28 NOTE — TELEPHONE ENCOUNTER
Refill of requested metformin and glipizide sent now.  In addition I have sent the Rybelsus and Jardiance in.  He has a follow-up appointment in August and recheck A1c will be performed at that time.    BHARTI

## 2023-08-03 ENCOUNTER — OFFICE VISIT (OUTPATIENT)
Dept: FAMILY MEDICINE | Facility: CLINIC | Age: 48
End: 2023-08-03
Payer: COMMERCIAL

## 2023-08-03 ENCOUNTER — TELEPHONE (OUTPATIENT)
Dept: FAMILY MEDICINE | Facility: CLINIC | Age: 48
End: 2023-08-03

## 2023-08-03 VITALS
TEMPERATURE: 98 F | SYSTOLIC BLOOD PRESSURE: 115 MMHG | HEART RATE: 68 BPM | DIASTOLIC BLOOD PRESSURE: 81 MMHG | WEIGHT: 207.6 LBS | HEIGHT: 69 IN | BODY MASS INDEX: 30.75 KG/M2 | OXYGEN SATURATION: 97 % | RESPIRATION RATE: 15 BRPM

## 2023-08-03 DIAGNOSIS — E78.5 HYPERLIPIDEMIA LDL GOAL <100: ICD-10-CM

## 2023-08-03 DIAGNOSIS — Z00.00 ROUTINE GENERAL MEDICAL EXAMINATION AT A HEALTH CARE FACILITY: Primary | ICD-10-CM

## 2023-08-03 DIAGNOSIS — R74.8 ELEVATED LIVER ENZYMES: ICD-10-CM

## 2023-08-03 DIAGNOSIS — E11.65 TYPE 2 DIABETES MELLITUS WITH HYPERGLYCEMIA, WITHOUT LONG-TERM CURRENT USE OF INSULIN (H): ICD-10-CM

## 2023-08-03 LAB
ALBUMIN SERPL BCG-MCNC: 4.7 G/DL (ref 3.5–5.2)
ALP SERPL-CCNC: 74 U/L (ref 40–129)
ALT SERPL W P-5'-P-CCNC: 22 U/L (ref 0–70)
ANION GAP SERPL CALCULATED.3IONS-SCNC: 8 MMOL/L (ref 7–15)
AST SERPL W P-5'-P-CCNC: 19 U/L (ref 0–45)
BILIRUB SERPL-MCNC: 0.4 MG/DL
BUN SERPL-MCNC: 10 MG/DL (ref 6–20)
CALCIUM SERPL-MCNC: 9.1 MG/DL (ref 8.6–10)
CHLORIDE SERPL-SCNC: 105 MMOL/L (ref 98–107)
CHOLEST SERPL-MCNC: 110 MG/DL
CREAT SERPL-MCNC: 0.88 MG/DL (ref 0.67–1.17)
CREAT UR-MCNC: 68.5 MG/DL
DEPRECATED HCO3 PLAS-SCNC: 26 MMOL/L (ref 22–29)
GFR SERPL CREATININE-BSD FRML MDRD: >90 ML/MIN/1.73M2
GLUCOSE SERPL-MCNC: 163 MG/DL (ref 70–99)
HBA1C MFR BLD: 7.6 % (ref 0–5.6)
HDLC SERPL-MCNC: 29 MG/DL
LDLC SERPL CALC-MCNC: 61 MG/DL
MICROALBUMIN UR-MCNC: <12 MG/L
MICROALBUMIN/CREAT UR: NORMAL MG/G{CREAT}
NONHDLC SERPL-MCNC: 81 MG/DL
POTASSIUM SERPL-SCNC: 4.4 MMOL/L (ref 3.4–5.3)
PROT SERPL-MCNC: 7.1 G/DL (ref 6.4–8.3)
SODIUM SERPL-SCNC: 139 MMOL/L (ref 136–145)
TRIGL SERPL-MCNC: 98 MG/DL

## 2023-08-03 PROCEDURE — 83036 HEMOGLOBIN GLYCOSYLATED A1C: CPT | Performed by: FAMILY MEDICINE

## 2023-08-03 PROCEDURE — 80061 LIPID PANEL: CPT | Performed by: FAMILY MEDICINE

## 2023-08-03 PROCEDURE — 80053 COMPREHEN METABOLIC PANEL: CPT | Performed by: FAMILY MEDICINE

## 2023-08-03 PROCEDURE — 99207 PR FOOT EXAM NO CHARGE: CPT | Mod: 25 | Performed by: FAMILY MEDICINE

## 2023-08-03 PROCEDURE — 82570 ASSAY OF URINE CREATININE: CPT | Performed by: FAMILY MEDICINE

## 2023-08-03 PROCEDURE — 99214 OFFICE O/P EST MOD 30 MIN: CPT | Mod: 25 | Performed by: FAMILY MEDICINE

## 2023-08-03 PROCEDURE — 36415 COLL VENOUS BLD VENIPUNCTURE: CPT | Performed by: FAMILY MEDICINE

## 2023-08-03 PROCEDURE — 99396 PREV VISIT EST AGE 40-64: CPT | Performed by: FAMILY MEDICINE

## 2023-08-03 PROCEDURE — 82043 UR ALBUMIN QUANTITATIVE: CPT | Performed by: FAMILY MEDICINE

## 2023-08-03 RX ORDER — GLIPIZIDE 10 MG/1
20 TABLET, FILM COATED, EXTENDED RELEASE ORAL DAILY
Qty: 180 TABLET | Refills: 0 | Status: CANCELLED | OUTPATIENT
Start: 2023-08-03

## 2023-08-03 RX ORDER — LANCETS
EACH MISCELLANEOUS
Qty: 100 EACH | Refills: 6 | Status: SHIPPED | OUTPATIENT
Start: 2023-08-03

## 2023-08-03 RX ORDER — GLIPIZIDE 10 MG/1
20 TABLET, FILM COATED, EXTENDED RELEASE ORAL DAILY
Qty: 180 TABLET | Refills: 1 | Status: SHIPPED | OUTPATIENT
Start: 2023-08-03 | End: 2024-01-29

## 2023-08-03 ASSESSMENT — ENCOUNTER SYMPTOMS
CONSTIPATION: 0
SORE THROAT: 0
DIARRHEA: 0
CHILLS: 0
PARESTHESIAS: 0
SHORTNESS OF BREATH: 0
HEARTBURN: 0
HEMATOCHEZIA: 0
ABDOMINAL PAIN: 0
COUGH: 0
MYALGIAS: 0
HEADACHES: 0
FREQUENCY: 0
JOINT SWELLING: 0
DYSURIA: 0
HEMATURIA: 0
FEVER: 0
PALPITATIONS: 0
NERVOUS/ANXIOUS: 0
WEAKNESS: 0
DIZZINESS: 0
NAUSEA: 0
ARTHRALGIAS: 0
EYE PAIN: 0

## 2023-08-03 NOTE — PATIENT INSTRUCTIONS
Labs today.  I will update refills when results return.        Preventive Health Recommendations  Male Ages 40 to 49    Yearly exam:             See your health care provider every year in order to  o   Review health changes.   o   Discuss preventive care.    o   Review your medicines if your doctor has prescribed any.  You should be tested each year for STDs (sexually transmitted diseases) if you re at risk.   Have a cholesterol test every 5 years.   Have a colonoscopy (test for colon cancer) if someone in your family has had colon cancer or polyps before age 50.   After age 45, have a diabetes test (fasting glucose). If you are at risk for diabetes, you should have this test every 3 years.    Talk with your health care provider about whether or not a prostate cancer screening test (PSA) is right for you.    Shots: Get a flu shot each year. Get a tetanus shot every 10 years.     Nutrition:  Eat at least 5 servings of fruits and vegetables daily.   Eat whole-grain bread, whole-wheat pasta and brown rice instead of white grains and rice.   Get adequate Calcium and Vitamin D.     Lifestyle  Exercise for at least 150 minutes a week (30 minutes a day, 5 days a week). This will help you control your weight and prevent disease.   Limit alcohol to one drink per day.   No smoking.   Wear sunscreen to prevent skin cancer.   See your dentist every six months for an exam and cleaning.

## 2023-08-03 NOTE — RESULT ENCOUNTER NOTE
Your A1c is significantly improved compared with previous.  It is now in the acceptable range but with your continued dietary measures along with metformin, Jardiance, and glipizide I anticipate it will continue to go down.  You can remain off the Rybelsus and I will update the other medication prescriptions.  Please call or MyChart message me if you have any questions.      PSK

## 2023-08-03 NOTE — PROGRESS NOTES
SUBJECTIVE:   CC: Jose is an 47 year old who presents for preventative health visit.       8/3/2023     6:45 AM   Additional Questions   Roomed by Deborah   Accompanied by Self         8/3/2023     6:45 AM   Patient Reported Additional Medications   Patient reports taking the following new medications None       Healthy Habits:     Getting at least 3 servings of Calcium per day:  Yes    Bi-annual eye exam:  Yes    Dental care twice a year:  Yes    Sleep apnea or symptoms of sleep apnea:  None    Diet:  Diabetic    Frequency of exercise:  2-3 days/week    Duration of exercise:  15-30 minutes    Taking medications regularly:  No    Barriers to taking medications:  Cost of medication    Medication side effects:  Not applicable    Additional concerns today:  No      Today's PHQ-2 Score:       8/3/2023     6:42 AM   PHQ-2 ( 1999 [employer])   Q1: Little interest or pleasure in doing things 0   Q2: Feeling down, depressed or hopeless 0   PHQ-2 Score 0   Q1: Little interest or pleasure in doing things Not at all   Q2: Feeling down, depressed or hopeless Not at all   PHQ-2 Score 0                 Diabetes Follow-up    How often are you checking your blood sugar? A few times a week  What time of day are you checking your blood sugars (select all that apply)?  Before meals  Have you had any blood sugars above 200?  No  Have you had any blood sugars below 70?  No  What symptoms do you notice when your blood sugar is low?  None  What concerns do you have today about your diabetes? None   Do you have any of these symptoms? (Select all that apply)  No numbness or tingling in feet.  No redness, sores or blisters on feet.  No complaints of excessive thirst.  No reports of blurry vision.  No significant changes to weight.      BP Readings from Last 2 Encounters:   08/03/23 115/81   09/01/22 115/79     Hemoglobin A1C (%)   Date Value   04/10/2023 8.6 (H)   09/01/2022 7.8 (H)   06/07/2021 11.9 (H)   10/26/2020 9.4 (H)     LDL Cholesterol  Calculated (mg/dL)   Date Value   2022 54   2021 59   2020 94   2019 89             Hyperlipidemia Follow-Up    Are you regularly taking any medication or supplement to lower your cholesterol?   Yes- simvastatin  Are you having muscle aches or other side effects that you think could be caused by your cholesterol lowering medication?  No      Social History     Tobacco Use    Smoking status: Former     Types: Cigarettes     Quit date: 2000     Years since quittin.6    Smokeless tobacco: Never   Substance Use Topics    Alcohol use: Yes     Comment: occassionally             8/3/2023     6:42 AM   Alcohol Use   Prescreen: >3 drinks/day or >7 drinks/week? No       Last PSA: No results found for: PSA    Reviewed orders with patient. Reviewed health maintenance and updated orders accordingly - Yes  BP Readings from Last 3 Encounters:   23 115/81   22 115/79   21 122/84    Wt Readings from Last 3 Encounters:   23 94.2 kg (207 lb 9.6 oz)   22 96.1 kg (211 lb 12.8 oz)   21 94.5 kg (208 lb 6 oz)                    Reviewed and updated as needed this visit by clinical staff   Tobacco  Allergies  Meds   Med Hx  Surg Hx  Fam Hx          Reviewed and updated as needed this visit by Provider   Tobacco  Allergies  Meds   Med Hx  Surg Hx  Fam Hx         Past Medical History:   Diagnosis Date    Acne vulgaris     Balanitis 2009    History of atypical/dysplastic nevus 2010    back with positive margins.    MEDICAL HISTORY OF - age 12    hospitalized for a week with unexplained abdominal pain and vomiting    Type II or unspecified type diabetes mellitus without mention of complication, not stated as uncontrolled       Past Surgical History:   Procedure Laterality Date    HC TOOTH EXTRACTION W/FORCEP         Review of Systems   Constitutional:  Negative for chills and fever.   HENT:  Negative for congestion, ear pain, hearing loss and sore  "throat.    Eyes:  Negative for pain and visual disturbance.   Respiratory:  Negative for cough and shortness of breath.    Cardiovascular:  Negative for chest pain, palpitations and peripheral edema.   Gastrointestinal:  Negative for abdominal pain, constipation, diarrhea, heartburn, hematochezia and nausea.   Genitourinary:  Negative for dysuria, frequency, genital sores, hematuria, impotence, penile discharge and urgency.   Musculoskeletal:  Negative for arthralgias, joint swelling and myalgias.   Skin:  Negative for rash.   Neurological:  Negative for dizziness, weakness, headaches and paresthesias.   Psychiatric/Behavioral:  Negative for mood changes. The patient is not nervous/anxious.          OBJECTIVE:   /81 (BP Location: Right arm, Patient Position: Sitting, Cuff Size: Adult Large)   Pulse 68   Temp 98  F (36.7  C) (Oral)   Resp 15   Ht 1.753 m (5' 9\")   Wt 94.2 kg (207 lb 9.6 oz)   SpO2 97%   BMI 30.66 kg/m      Physical Exam  GENERAL: alert, no distress, and obese  EYES: Eyes grossly normal to inspection, PERRL and conjunctivae and sclerae normal  HENT: ear canals and TM's normal, nose and mouth without ulcers or lesions  NECK: no adenopathy, no asymmetry, masses, or scars and thyroid normal to palpation  RESP: lungs clear to auscultation - no rales, rhonchi or wheezes  CV: regular rate and rhythm, normal S1 S2, no S3 or S4, no murmur, click or rub, no peripheral edema and peripheral pulses strong  ABDOMEN: soft, nontender, no hepatosplenomegaly, no masses and bowel sounds normal   (male): normal male genitalia without lesions or urethral discharge, no hernia  MS: no gross musculoskeletal defects noted, no edema  SKIN: no suspicious lesions or rashes  NEURO: Normal strength and tone, mentation intact and speech normal  PSYCH: mentation appears normal, affect normal/bright  Diabetic foot exam: normal DP and PT pulses, no trophic changes or ulcerative lesions, normal sensory exam, and normal " "monofilament exam    Diagnostic Test Results:  Labs reviewed in Epic    ASSESSMENT/PLAN:   (Z00.00) Routine general medical examination at a health care facility  (primary encounter diagnosis)  Comment: fasting  Plan: screening and preventative care discussed.      (E11.65) Type 2 diabetes mellitus with hyperglycemia, without long-term current use of insulin (H)  Comment: dietary changes recently with improved blood sugars at home.   Plan: Lipid panel reflex to direct LDL Non-fasting,         Albumin Random Urine Quantitative with Creat         Ratio, Hemoglobin A1c, Comprehensive metabolic         panel (BMP + Alb, Alk Phos, ALT, AST, Total.         Bili, TP), FOOT EXAM, blood glucose monitoring         (NO BRAND SPECIFIED) meter device kit, thin (NO        BRAND SPECIFIED) lancets, blood glucose (NO         BRAND SPECIFIED) test strip        Refill medications when results return.  Has not been using the Rybelsus recently.  If blood sugar at goal or near goal will discontinue the rybelsus and continue other treatment.      (E78.5) Hyperlipidemia LDL goal <100  Comment:   Plan: Comprehensive metabolic panel (BMP + Alb, Alk         Phos, ALT, AST, Total. Bili, TP)        Continue simvastatin likely - await results of Lipid panel.    Elevated Liver enzymes   Repeat labs pending.      Patient has been advised of split billing requirements and indicates understanding: Yes      COUNSELING:   Reviewed preventive health counseling, as reflected in patient instructions       Regular exercise       Healthy diet/nutrition       Vision screening       Aspirin prophylaxis        Colorectal cancer screening       Prostate cancer screening       Osteoporosis prevention/bone health      BMI:   Estimated body mass index is 30.66 kg/m  as calculated from the following:    Height as of this encounter: 1.753 m (5' 9\").    Weight as of this encounter: 94.2 kg (207 lb 9.6 oz).   Weight management plan: Discussed healthy diet and " exercise guidelines      He reports that he quit smoking about 23 years ago. His smoking use included cigarettes. He has never used smokeless tobacco.            Steffanie Villa MD  [facility]      Patient Instructions   Labs today.  I will update refills when results return.

## 2023-08-07 NOTE — TELEPHONE ENCOUNTER
Central Prior Authorization Team   Phone: [phone]      PA Initiation    Medication: JARDIANCE 25 MG PO TABS  Insurance Company: [employer] - Phone [phone] Fax [phone]  Pharm[place] [facility] [place] [address] [address] [place] [address]  Filling Pharmacy Phone: [phone]  Filling Pharmacy Fax:    Start Date: 8/7/2023

## 2023-08-08 DIAGNOSIS — E78.5 HYPERLIPIDEMIA LDL GOAL <100: ICD-10-CM

## 2023-08-08 RX ORDER — SIMVASTATIN 40 MG
40 TABLET ORAL AT BEDTIME
Qty: 90 TABLET | Refills: 3 | Status: SHIPPED | OUTPATIENT
Start: 2023-08-08 | End: 2024-08-15

## 2023-08-09 NOTE — RESULT ENCOUNTER NOTE
Your cholesterol is under good control with current treatment. Refill of the simvastatin is sent to the pharmacy for you.  Kidney function and liver testing are both normal.  Urine testing is normal.  There is no elevated protein present.  Please call or MyChart message me if you have any questions.      PSK

## 2023-08-14 NOTE — TELEPHONE ENCOUNTER
Prior Authorization Not Needed per Insurance    Medication: JARDIANCE 25 MG PO TABS  Insurance Company: [employer] - Phone [phone] Fax [phone]  Expected CoPay:      Pharm[place] Rx: [facility] [place] [address] [address] [place] [address]  Pharmacy Notified: Yes - verified pharmacy received a paid claim  Patient Notified: Yes (pharmacy will notify patient when ready)

## 2023-09-02 DIAGNOSIS — E11.65 TYPE 2 DIABETES MELLITUS WITH HYPERGLYCEMIA, WITHOUT LONG-TERM CURRENT USE OF INSULIN (H): ICD-10-CM

## 2023-09-05 RX ORDER — BLOOD-GLUCOSE METER
EACH MISCELLANEOUS
Qty: 1 KIT | Refills: 0 | Status: SHIPPED | OUTPATIENT
Start: 2023-09-05 | End: 2023-11-02

## 2023-09-26 DIAGNOSIS — E11.65 TYPE 2 DIABETES MELLITUS WITH HYPERGLYCEMIA, WITHOUT LONG-TERM CURRENT USE OF INSULIN (H): ICD-10-CM

## 2023-09-26 RX ORDER — BLOOD SUGAR DIAGNOSTIC
STRIP MISCELLANEOUS
Qty: 100 STRIP | Refills: 0 | Status: SHIPPED | OUTPATIENT
Start: 2023-09-26 | End: 2024-01-04

## 2023-10-20 ENCOUNTER — OFFICE VISIT (OUTPATIENT)
Dept: OPHTHALMOLOGY | Facility: CLINIC | Age: 48
End: 2023-10-20
Payer: COMMERCIAL

## 2023-10-20 DIAGNOSIS — E11.65 TYPE 2 DIABETES MELLITUS WITH HYPERGLYCEMIA, WITHOUT LONG-TERM CURRENT USE OF INSULIN (H): Primary | ICD-10-CM

## 2023-10-20 DIAGNOSIS — H52.13 MYOPIA OF BOTH EYES WITH ASTIGMATISM AND PRESBYOPIA: ICD-10-CM

## 2023-10-20 DIAGNOSIS — H52.203 MYOPIA OF BOTH EYES WITH ASTIGMATISM AND PRESBYOPIA: ICD-10-CM

## 2023-10-20 DIAGNOSIS — E11.9 TYPE 2 DIABETES MELLITUS WITHOUT RETINOPATHY (H): ICD-10-CM

## 2023-10-20 DIAGNOSIS — H52.4 MYOPIA OF BOTH EYES WITH ASTIGMATISM AND PRESBYOPIA: ICD-10-CM

## 2023-10-20 DIAGNOSIS — L21.9 DERMATITIS, SEBORRHEIC: ICD-10-CM

## 2023-10-20 PROCEDURE — 92014 COMPRE OPH EXAM EST PT 1/>: CPT | Performed by: OPHTHALMOLOGY

## 2023-10-20 RX ORDER — KETOCONAZOLE 20 MG/ML
SHAMPOO TOPICAL
Qty: 120 ML | Refills: 7 | Status: SHIPPED | OUTPATIENT
Start: 2023-10-20

## 2023-10-20 ASSESSMENT — SLIT LAMP EXAM - LIDS
COMMENTS: NORMAL
COMMENTS: NORMAL

## 2023-10-20 ASSESSMENT — TONOMETRY
IOP_METHOD: TONOPEN
OD_IOP_MMHG: 17
OS_IOP_MMHG: 19
IOP_METHOD: ICARE
OD_IOP_MMHG: 21
OS_IOP_MMHG: 26

## 2023-10-20 ASSESSMENT — VISUAL ACUITY
OS_SC: 20/20
OD_SC+: +3
OD_SC: 20/20
OS_SC: J2
METHOD: SNELLEN - LINEAR
OD_SC: J1-2
OS_SC+: -1

## 2023-10-20 ASSESSMENT — CONF VISUAL FIELD
OD_SUPERIOR_NASAL_RESTRICTION: 0
OS_INFERIOR_TEMPORAL_RESTRICTION: 0
OS_INFERIOR_NASAL_RESTRICTION: 0
OS_SUPERIOR_TEMPORAL_RESTRICTION: 0
OD_NORMAL: 1
METHOD: COUNTING FINGERS
OD_INFERIOR_TEMPORAL_RESTRICTION: 0
OD_SUPERIOR_TEMPORAL_RESTRICTION: 0
OD_INFERIOR_NASAL_RESTRICTION: 0
OS_NORMAL: 1
OS_SUPERIOR_NASAL_RESTRICTION: 0

## 2023-10-20 ASSESSMENT — REFRACTION_MANIFEST
OD_AXIS: 005
OD_SPHERE: -0.50
OS_CYLINDER: +0.75
OS_AXIS: 020
OS_ADD: +1.25
OD_CYLINDER: +0.75
OS_SPHERE: -0.50
OD_ADD: +1.25

## 2023-10-20 ASSESSMENT — CUP TO DISC RATIO
OS_RATIO: 0.3
OD_RATIO: 0.3

## 2023-10-20 ASSESSMENT — EXTERNAL EXAM - LEFT EYE: OS_EXAM: NORMAL

## 2023-10-20 ASSESSMENT — EXTERNAL EXAM - RIGHT EYE: OD_EXAM: NORMAL

## 2023-10-20 NOTE — PROGRESS NOTES
HPI    Pt  presents to the clinic for an annual diabetic eye exam. He does not wear glasses or contacts. No vision concerns. Eyes are comfortable most of the time, though a little dry today.   Lab Results       Component                Value               Date                       A1C                      7.6                 08/03/2023                 A1C                      8.6                 04/10/2023                 A1C                      7.8                 09/01/2022                 A1C                      9.0                 12/28/2021                 A1C                      11.9                06/07/2021                 A1C                      9.4                 10/26/2020                 A1C                      8.8                 03/09/2020                 A1C                      7.9                 07/05/2019                 A1C                      8.1                 12/14/2018             Last edited by Oneyda Gamez on 10/20/2023  2:22 PM.         Review of systems for the eyes was negative other than the pertinent positives/negatives listed in the HPI.      Assessment & Plan    HPI:  Jose Crews is a 48 year old male with history of T2DM, HLD presents for annual dilated fundus exam. Denies visual complaints. No redness, tearing, flashes or floaters. Noted dryness today, but otherwise normal.    POHx: none  PMHx: T2DM, HLD  Current Medications: alcohol swab prep pads, Use to swab area of injection/mirna as directed.  Alcohol Swabs (B-D SINGLE USE SWABS REGULAR) PADS, USE TO SWAB AREA OF INJECTION/MIRNA AS DIRECTED  aspirin 81 MG tablet, Take 1 tablet by mouth daily. *  blood glucose (NO BRAND SPECIFIED) test strip, Use to test blood sugar 1 times daily or as directed. To accompany: Blood Glucose Monitor Brands: per insurance.  blood glucose calibration (NO BRAND SPECIFIED) solution, To accompany: Blood Glucose Monitor Brands: per insurance.  blood glucose monitoring (SOFTCLIX)  lancets, USE WITH LANCING DEVICE TO TEST BLOOD SUGARS ONCE DAILY  Blood Glucose Monitoring Suppl (ONETOUCH VERIO FLEX SYSTEM) w/Device KIT, USE TO TEST BLOOD SUGARS ONCE A DAY OR AS DIRECTED.  Blood Glucose Monitoring Suppl (ONETOUCH VERIO FLEX SYSTEM) w/Device KIT, USE TO TEST BLOOD SUGAR ONCE DAILY OR AS DIRECTED  empagliflozin (JARDIANCE) 25 MG TABS tablet, Take 1 tablet (25 mg) by mouth daily  fluocinonide (LIDEX) 0.05 % external solution, Apply once daily as needed for scalp itch or flaking. Use sparingly.  glipiZIDE (GLUCOTROL XL) 10 MG 24 hr tablet, Take 2 tablets (20 mg) by mouth daily  ketoconazole (NIZORAL) 2 % external cream, Apply topically 2 times daily (Patient taking differently: Apply topically as needed)  metFORMIN (GLUCOPHAGE) 1000 MG tablet, Take 1 tablet (1,000 mg) by mouth 2 times daily (with meals)  Multiple Vitamin (DAILY MULTIVITAMIN PO), Take 1 tablet by mouth daily.  ONETOUCH VERIO IQ test strip, USE TO TEST BLOOD SUGAR ONCE DAILY OR AS DIRECTED  simvastatin (ZOCOR) 40 MG tablet, Take 1 tablet (40 mg) by mouth At Bedtime  thin (NO BRAND SPECIFIED) lancets, Use with lanceting device. To accompany: Blood Glucose Monitor Brands: per insurance.  ARB NOT PRESCRIBED, INTENTIONAL,, by Other route continuous prn.    No current facility-administered medications on file prior to visit.    FHx: Doug ENAMORADOx: denies history of ocular surgeries       Current Eye Medications:  None    Assessment & Plan:  (E11.65) Type 2 diabetes mellitus with hyperglycemia, without long-term current use of insulin (H)  (primary encounter diagnosis)  (E11.9) Type 2 diabetes mellitus without retinopathy (H)  Diagnosed ~2008  Most recent HgBA1c 7.6 on 8/3/23  No background diabetic retinopathy or neovascularization noted on today's exam.  Discussed ocular and systemic benefits of blood pressure and blood sugar control.  Return in 1 year for full exam with dilation or sooner if changes to vision.        (H52.13,  H52.203,   H52.4) Myopia of both eyes with astigmatism and presbyopia  Patient has minimal myopia that does not require treatment at this time.     (H40.053) Bilateral ocular hypertension  No signs of glaucoma on exaam. Continue observation for now      Return in about 1 year (around 10/20/2024) for Annual Visit-v/t/d/MRx.        Lucas He MD     Attending Physician Attestation:  Complete documentation of historical and exam elements from today's encounter can be found in the full encounter summary report (not reduplicated in this progress note).  I personally obtained the chief complaint(s) and history of present illness.  I confirmed and edited as necessary the review of systems, past medical/surgical history, family history, social history, and examination findings as documented by others; and I examined the patient myself.  I personally reviewed the relevant tests, images, and reports as documented above.  I formulated and edited as necessary the assessment and plan and discussed the findings and management plan with the patient and family. - Lucas He MD

## 2023-10-20 NOTE — NURSING NOTE
Chief Complaints and History of Present Illnesses   Patient presents with    Diabetic Eye Exam     Chief Complaint(s) and History of Present Illness(es)       Diabetic Eye Exam               Comments    Pt  presents to the clinic for an annual diabetic eye exam. He does not wear glasses or contacts. No vision concerns. Eyes are comfortable most of the time, though a little dry today.   Lab Results       Component                Value               Date                       A1C                      7.6                 08/03/2023                 A1C                      8.6                 04/10/2023                 A1C                      7.8                 09/01/2022                 A1C                      9.0                 12/28/2021                 A1C                      11.9                06/07/2021                 A1C                      9.4                 10/26/2020                 A1C                      8.8                 03/09/2020                 A1C                      7.9                 07/05/2019                 A1C                      8.1                 12/14/2018                              Infectious Disease

## 2023-11-02 DIAGNOSIS — E11.65 TYPE 2 DIABETES MELLITUS WITH HYPERGLYCEMIA, WITHOUT LONG-TERM CURRENT USE OF INSULIN (H): ICD-10-CM

## 2023-11-02 RX ORDER — BLOOD-GLUCOSE METER
EACH MISCELLANEOUS
Qty: 1 KIT | Refills: 0 | Status: SHIPPED | OUTPATIENT
Start: 2023-11-02

## 2023-12-11 ENCOUNTER — E-VISIT (OUTPATIENT)
Dept: FAMILY MEDICINE | Facility: CLINIC | Age: 48
End: 2023-12-11
Payer: COMMERCIAL

## 2023-12-11 DIAGNOSIS — L72.3 INFECTED SEBACEOUS CYST: Primary | ICD-10-CM

## 2023-12-11 DIAGNOSIS — L08.9 INFECTED SEBACEOUS CYST: Primary | ICD-10-CM

## 2023-12-11 PROCEDURE — 99421 OL DIG E/M SVC 5-10 MIN: CPT | Performed by: FAMILY MEDICINE

## 2023-12-12 RX ORDER — CEPHALEXIN 500 MG/1
500 CAPSULE ORAL 2 TIMES DAILY
Qty: 20 CAPSULE | Refills: 0 | Status: SHIPPED | OUTPATIENT
Start: 2023-12-12 | End: 2023-12-22

## 2024-01-04 DIAGNOSIS — E11.65 TYPE 2 DIABETES MELLITUS WITH HYPERGLYCEMIA, WITHOUT LONG-TERM CURRENT USE OF INSULIN (H): ICD-10-CM

## 2024-01-04 RX ORDER — BLOOD SUGAR DIAGNOSTIC
STRIP MISCELLANEOUS
Qty: 100 STRIP | Refills: 0 | Status: SHIPPED | OUTPATIENT
Start: 2024-01-04 | End: 2024-02-28

## 2024-01-29 DIAGNOSIS — E11.65 TYPE 2 DIABETES MELLITUS WITH HYPERGLYCEMIA, WITHOUT LONG-TERM CURRENT USE OF INSULIN (H): ICD-10-CM

## 2024-01-29 RX ORDER — GLIPIZIDE 10 MG/1
20 TABLET, FILM COATED, EXTENDED RELEASE ORAL DAILY
Qty: 60 TABLET | Refills: 0 | Status: SHIPPED | OUTPATIENT
Start: 2024-01-29 | End: 2024-02-28

## 2024-02-10 ENCOUNTER — HEALTH MAINTENANCE LETTER (OUTPATIENT)
Age: 49
End: 2024-02-10

## 2024-02-13 DIAGNOSIS — E11.65 TYPE 2 DIABETES MELLITUS WITH HYPERGLYCEMIA, WITHOUT LONG-TERM CURRENT USE OF INSULIN (H): ICD-10-CM

## 2024-02-23 DIAGNOSIS — E11.65 TYPE 2 DIABETES MELLITUS WITH HYPERGLYCEMIA, WITHOUT LONG-TERM CURRENT USE OF INSULIN (H): ICD-10-CM

## 2024-02-23 RX ORDER — GLIPIZIDE 10 MG/1
20 TABLET, FILM COATED, EXTENDED RELEASE ORAL DAILY
Qty: 60 TABLET | Refills: 0 | OUTPATIENT
Start: 2024-02-23

## 2024-02-24 DIAGNOSIS — E11.65 TYPE 2 DIABETES MELLITUS WITH HYPERGLYCEMIA, WITHOUT LONG-TERM CURRENT USE OF INSULIN (H): ICD-10-CM

## 2024-02-27 ENCOUNTER — LAB (OUTPATIENT)
Dept: LAB | Facility: CLINIC | Age: 49
End: 2024-02-27
Payer: COMMERCIAL

## 2024-02-27 DIAGNOSIS — E11.65 TYPE 2 DIABETES MELLITUS WITH HYPERGLYCEMIA, WITHOUT LONG-TERM CURRENT USE OF INSULIN (H): ICD-10-CM

## 2024-02-27 LAB — HBA1C MFR BLD: 9.4 % (ref 0–5.6)

## 2024-02-27 PROCEDURE — 83036 HEMOGLOBIN GLYCOSYLATED A1C: CPT

## 2024-02-27 PROCEDURE — 36415 COLL VENOUS BLD VENIPUNCTURE: CPT

## 2024-02-28 ENCOUNTER — VIRTUAL VISIT (OUTPATIENT)
Dept: FAMILY MEDICINE | Facility: CLINIC | Age: 49
End: 2024-02-28
Payer: COMMERCIAL

## 2024-02-28 DIAGNOSIS — E11.65 TYPE 2 DIABETES MELLITUS WITH HYPERGLYCEMIA, WITHOUT LONG-TERM CURRENT USE OF INSULIN (H): ICD-10-CM

## 2024-02-28 PROCEDURE — 99214 OFFICE O/P EST MOD 30 MIN: CPT | Mod: 95 | Performed by: FAMILY MEDICINE

## 2024-02-28 RX ORDER — GLIPIZIDE 10 MG/1
20 TABLET, FILM COATED, EXTENDED RELEASE ORAL DAILY
Qty: 180 TABLET | Refills: 1 | Status: SHIPPED | OUTPATIENT
Start: 2024-02-28 | End: 2024-08-15

## 2024-02-28 RX ORDER — BLOOD SUGAR DIAGNOSTIC
1 STRIP MISCELLANEOUS DAILY
Qty: 100 STRIP | Refills: 3 | Status: SHIPPED | OUTPATIENT
Start: 2024-02-28

## 2024-02-28 NOTE — RESULT ENCOUNTER NOTE
Your long-term blood sugar testing has risen again in 2 the high range.  We will discuss this at your upcoming visit.  Please call or MyChart message me if you have any questions.      PSK

## 2024-02-28 NOTE — PATIENT INSTRUCTIONS
Resume dietary measures to control blood sugar.  Continue current medication.  Recheck A1C in 3 months.  If it remains elevated, changes/addition to medications will be needed.    Physical recommended in August.

## 2024-02-28 NOTE — PROGRESS NOTES
"Jose is a 48 year old who is being evaluated via a billable video visit.      How would you like to obtain your AVS? MyChart  If the video visit is dropped, the invitation should be resent by: Text to cell phone: [phone]  Will anyone else be joining your video visit? No          Assessment & Plan     Type 2 diabetes mellitus with hyperglycemia, without long-term current use of insulin (H)  Blood sugar uncontrolled on current testing.  He reports that he has not been following his diet regularly over the last 3 months with increased carb intake primarily.  Is planning to reinstitute his dietary measures and would prefer to do this with a repeat blood test in 3 months rather than increase current medications.  A1c was 7.9 at his testing in August.  We did discuss that this is acceptable but not ideal and additional medication may be needed even if he gets to the 7.9%.  Refills are updated today follow-up appointment in May for A1c and August for physical exam.  - blood glucose (ONETOUCH VERIO IQ) test strip; 1 strip by In Vitro route daily Use to test blood sugar 1 times daily or as directed.  - empagliflozin (JARDIANCE) 25 MG TABS tablet; Take 1 tablet (25 mg) by mouth daily  - glipiZIDE (GLUCOTROL XL) 10 MG 24 hr tablet; Take 2 tablets (20 mg) by mouth daily  - metFORMIN (GLUCOPHAGE) 1000 MG tablet; Take 1 tablet (1,000 mg) by mouth 2 times daily (with meals)    Review of the result(s) of each unique test - A1c  Prescription drug management        BMI  Estimated body mass index is 30.66 kg/m  as calculated from the following:    Height as of 8/3/23: 1.753 m (5' 9\").    Weight as of 8/3/23: 94.2 kg (207 lb 9.6 oz).   Weight management plan: Discussed healthy diet and exercise guidelines      Patient Instructions   Resume dietary measures to control blood sugar.  Continue current medication.  Recheck A1C in 3 months.  If it remains elevated, changes/addition to medications will be needed.    Physical recommended " in August.      Lucia Garcia is a 48 year old, presenting for the following health issues:  Diabetes    History of Present Illness       Diabetes:   He presents for follow up of diabetes.  He is checking home blood glucose a few times a week.   He checks blood glucose before meals.  Blood glucose is never over 200 and never under 70. He is aware of hypoglycemia symptoms including lethargy.    He has no concerns regarding his diabetes at this time.   He is not experiencing numbness or burning in feet, excessive thirst, blurry vision, weight changes or redness, sores or blisters on feet.           He eats 2-3 servings of fruits and vegetables daily.He consumes 0 sweetened beverage(s) daily.He exercises with enough effort to increase his heart rate 9 or less minutes per day.  He exercises with enough effort to increase his heart rate 3 or less days per week.   He is taking medications regularly.     Exercise - walking.       Diabetes Follow-up    How often are you checking your blood sugar? A few times a week  What time of day are you checking your blood sugars (select all that apply)?  Before meals  Have you had any blood sugars above 200?  No  Have you had any blood sugars below 70?  No  Average 140  What symptoms do you notice when your blood sugar is low?  Shaky and Lethargy  What concerns do you have today about your diabetes? None   Do you have any of these symptoms? (Select all that apply)  No numbness or tingling in feet.  No redness, sores or blisters on feet.  No complaints of excessive thirst.  No reports of blurry vision.  No significant changes to weight.    Knows he needs to change eating habits.  Past 3 months more stress and not as good at eating healthy.    BP Readings from Last 2 Encounters:   08/03/23 115/81   09/01/22 115/79     Hemoglobin A1C (%)   Date Value   02/27/2024 9.4 (H)   08/03/2023 7.6 (H)   06/07/2021 11.9 (H)   10/26/2020 9.4 (H)     LDL Cholesterol Calculated (mg/dL)   Date  Value   08/03/2023 61   09/01/2022 54   03/09/2020 94   07/05/2019 89             Hyperlipidemia Follow-Up    Are you regularly taking any medication or supplement to lower your cholesterol?   Yes- simvastatin  Are you having muscle aches or other side effects that you think could be caused by your cholesterol lowering medication?  No            Review of Systems  Constitutional, HEENT, cardiovascular, pulmonary, gi and gu systems are negative, except as otherwise noted.      Objective           Vitals:  No vitals were obtained today due to virtual visit.    Physical Exam   GENERAL: alert, no distress, and obese  EYES: Eyes grossly normal to inspection.  No discharge or erythema, or obvious scleral/conjunctival abnormalities.  RESP: No audible wheeze, cough, or visible cyanosis.    SKIN: Visible skin clear. No significant rash, abnormal pigmentation or lesions.  NEURO: Cranial nerves grossly intact.  Mentation and speech appropriate for age.  PSYCH: Appropriate affect, tone, and pace of words    Lab on 02/27/2024   Component Date Value Ref Range Status    Hemoglobin A1C 02/27/2024 9.4 (H)  0.0 - 5.6 % Final         Video-Visit Details    Type of service:  Video Visit     Originating Location (pt. Location): Home    Distant Location (provider location):  Off-site  Platform used for Video Visit: Pat  Signed Electronically by: Steffanie Villa MD

## 2024-07-09 ENCOUNTER — PATIENT OUTREACH (OUTPATIENT)
Dept: CARE COORDINATION | Facility: CLINIC | Age: 49
End: 2024-07-09
Payer: COMMERCIAL

## 2024-07-17 ENCOUNTER — DOCUMENTATION ONLY (OUTPATIENT)
Dept: FAMILY MEDICINE | Facility: CLINIC | Age: 49
End: 2024-07-17
Payer: COMMERCIAL

## 2024-07-17 ENCOUNTER — MYC MEDICAL ADVICE (OUTPATIENT)
Dept: FAMILY MEDICINE | Facility: CLINIC | Age: 49
End: 2024-07-17
Payer: COMMERCIAL

## 2024-07-17 DIAGNOSIS — E11.65 TYPE 2 DIABETES MELLITUS WITH HYPERGLYCEMIA, WITHOUT LONG-TERM CURRENT USE OF INSULIN (H): Primary | ICD-10-CM

## 2024-07-17 DIAGNOSIS — R74.8 ELEVATED LIVER ENZYMES: ICD-10-CM

## 2024-07-17 DIAGNOSIS — E78.5 HYPERLIPIDEMIA LDL GOAL <100: ICD-10-CM

## 2024-07-17 DIAGNOSIS — Z23 VACCINE FOR VIRAL HEPATITIS: Primary | ICD-10-CM

## 2024-07-17 NOTE — PROGRESS NOTES
Jose Crews has an upcoming lab appointment:    Future Appointments   Date Time Provider Department Center   7[facility]23/2024  [facility]   10/22/2024  2:45 PM Lucas He MD [facility]   11/25/2024  4:20 PM Steffanie Villa MD [facility]     Patient is scheduled for the following lab(s): A1C    There is no order available. Please review and placr future orders as appropriate.    Health Maintenance Due   Topic    BMP     LIPID     MICROALBUMIN     ANNUAL REVIEW OF  ORDERS      Thank you,  Sally Patel

## 2024-07-23 ENCOUNTER — LAB (OUTPATIENT)
Dept: LAB | Facility: CLINIC | Age: 49
End: 2024-07-23
Payer: COMMERCIAL

## 2024-07-23 DIAGNOSIS — R74.8 ELEVATED LIVER ENZYMES: ICD-10-CM

## 2024-07-23 DIAGNOSIS — E78.5 HYPERLIPIDEMIA LDL GOAL <100: ICD-10-CM

## 2024-07-23 DIAGNOSIS — E11.65 TYPE 2 DIABETES MELLITUS WITH HYPERGLYCEMIA, WITHOUT LONG-TERM CURRENT USE OF INSULIN (H): ICD-10-CM

## 2024-07-23 LAB
ALBUMIN SERPL BCG-MCNC: 4.6 G/DL (ref 3.5–5.2)
ALP SERPL-CCNC: 79 U/L (ref 40–150)
ALT SERPL W P-5'-P-CCNC: 28 U/L (ref 0–70)
ANION GAP SERPL CALCULATED.3IONS-SCNC: 12 MMOL/L (ref 7–15)
AST SERPL W P-5'-P-CCNC: 21 U/L (ref 0–45)
BILIRUB SERPL-MCNC: 0.9 MG/DL
BUN SERPL-MCNC: 12.2 MG/DL (ref 6–20)
CALCIUM SERPL-MCNC: 9 MG/DL (ref 8.8–10.4)
CHLORIDE SERPL-SCNC: 107 MMOL/L (ref 98–107)
CHOLEST SERPL-MCNC: 137 MG/DL
CREAT SERPL-MCNC: 0.87 MG/DL (ref 0.67–1.17)
CREAT UR-MCNC: 99.4 MG/DL
EGFRCR SERPLBLD CKD-EPI 2021: >90 ML/MIN/1.73M2
FASTING STATUS PATIENT QL REPORTED: YES
FASTING STATUS PATIENT QL REPORTED: YES
GLUCOSE SERPL-MCNC: 152 MG/DL (ref 70–99)
HBA1C MFR BLD: 8 % (ref 0–5.6)
HCO3 SERPL-SCNC: 22 MMOL/L (ref 22–29)
HDLC SERPL-MCNC: 30 MG/DL
LDLC SERPL CALC-MCNC: 69 MG/DL
MICROALBUMIN UR-MCNC: <12 MG/L
MICROALBUMIN/CREAT UR: NORMAL MG/G{CREAT}
NONHDLC SERPL-MCNC: 107 MG/DL
POTASSIUM SERPL-SCNC: 4 MMOL/L (ref 3.4–5.3)
PROT SERPL-MCNC: 7.1 G/DL (ref 6.4–8.3)
SODIUM SERPL-SCNC: 141 MMOL/L (ref 135–145)
TRIGL SERPL-MCNC: 188 MG/DL
TSH SERPL DL<=0.005 MIU/L-ACNC: 2.02 UIU/ML (ref 0.3–4.2)

## 2024-07-23 PROCEDURE — 84443 ASSAY THYROID STIM HORMONE: CPT

## 2024-07-23 PROCEDURE — 80061 LIPID PANEL: CPT

## 2024-07-23 PROCEDURE — 83036 HEMOGLOBIN GLYCOSYLATED A1C: CPT

## 2024-07-23 PROCEDURE — 80053 COMPREHEN METABOLIC PANEL: CPT

## 2024-07-23 PROCEDURE — 82043 UR ALBUMIN QUANTITATIVE: CPT

## 2024-07-23 PROCEDURE — 36415 COLL VENOUS BLD VENIPUNCTURE: CPT

## 2024-07-23 PROCEDURE — 82570 ASSAY OF URINE CREATININE: CPT

## 2024-07-25 ENCOUNTER — TELEPHONE (OUTPATIENT)
Dept: FAMILY MEDICINE | Facility: CLINIC | Age: 49
End: 2024-07-25
Payer: COMMERCIAL

## 2024-07-25 NOTE — RESULT ENCOUNTER NOTE
See message below for recent lab results.  He does have an appointment November but I would recommend a sooner virtual visit to discuss his diabetes control.  Recheck A1c in 3 to 4 months is recommended.  Please assist him with scheduling this virtual visit.  PSK    Your cholesterol is under very good control.  Triglycerides are borderline elevated which is likely due to the elevated blood sugar.  Kidney and liver testing are both normal.  Long-term blood sugar testing is improved compared with previous but does remain above our ideal and acceptable goal ranges.  I would recommend an appointment (virtual is okay) to discuss her medication and changes that may be beneficial at further improvements in blood sugar control.  Please call or MyChart message me if you have any questions.      PSK

## 2024-07-25 NOTE — TELEPHONE ENCOUNTER
This RN attempted to reach patient on 7/25/24.  Left message for patient to return call.  Mychart message also sent to patient.        If patient calls back:    Please give patient provider message as written below.      Kristina Kjellberg, MSN, RN

## 2024-07-25 NOTE — LETTER
July 31, 2024      Jose Crews  [address]  [place] [address]        Your cholesterol is under very good control.  Triglycerides are borderline elevated which is likely due to the elevated blood sugar.  Kidney and liver testing are both normal.  Long-term blood sugar testing is improved compared with previous but does remain above our ideal and acceptable goal ranges.  I would recommend an appointment (virtual is okay) to discuss her medication and changes that may be beneficial at further improvements in blood sugar control.          Sincerely,        Steffanie Villa MD

## 2024-07-26 NOTE — TELEPHONE ENCOUNTER
This RN attempted to reach patient on 7/26/24.  Unable to leave voicemail as voicemail box is full.         If patient calls back:     Please give patient provider message as written below.       Andrew Resendez RN  [facility]

## 2024-07-29 NOTE — TELEPHONE ENCOUNTER
This RN attempted to reach patient on 7/29/24.  Unable to leave voicemail as voicemail box is full.         If patient calls back:     Please give patient provider message as written below.       Kristina Kjellberg, MSN, RN

## 2024-07-30 NOTE — TELEPHONE ENCOUNTER
Multiple call attempts made to patient and unable to reach at this time.     Routing to provider to review and advise on next steps.    LEATHA Emery  [facility]

## 2024-08-01 ENCOUNTER — PATIENT OUTREACH (OUTPATIENT)
Dept: CARE COORDINATION | Facility: CLINIC | Age: 49
End: 2024-08-01
Payer: COMMERCIAL

## 2024-08-15 DIAGNOSIS — E78.5 HYPERLIPIDEMIA LDL GOAL <100: ICD-10-CM

## 2024-08-15 DIAGNOSIS — E11.65 TYPE 2 DIABETES MELLITUS WITH HYPERGLYCEMIA, WITHOUT LONG-TERM CURRENT USE OF INSULIN (H): ICD-10-CM

## 2024-08-15 RX ORDER — SIMVASTATIN 40 MG
40 TABLET ORAL
Qty: 60 TABLET | Refills: 0 | Status: SHIPPED | OUTPATIENT
Start: 2024-08-15

## 2024-08-15 RX ORDER — GLIPIZIDE 10 MG/1
20 TABLET, FILM COATED, EXTENDED RELEASE ORAL DAILY
Qty: 120 TABLET | Refills: 0 | Status: SHIPPED | OUTPATIENT
Start: 2024-08-15

## 2024-09-07 ENCOUNTER — HEALTH MAINTENANCE LETTER (OUTPATIENT)
Age: 49
End: 2024-09-07

## 2024-09-07 DIAGNOSIS — E11.65 TYPE 2 DIABETES MELLITUS WITH HYPERGLYCEMIA, WITHOUT LONG-TERM CURRENT USE OF INSULIN (H): ICD-10-CM

## 2024-10-19 DIAGNOSIS — E78.5 HYPERLIPIDEMIA LDL GOAL <100: ICD-10-CM

## 2024-10-19 DIAGNOSIS — E11.65 TYPE 2 DIABETES MELLITUS WITH HYPERGLYCEMIA, WITHOUT LONG-TERM CURRENT USE OF INSULIN (H): ICD-10-CM

## 2024-10-21 RX ORDER — SIMVASTATIN 40 MG
40 TABLET ORAL
Qty: 90 TABLET | Refills: 0 | Status: SHIPPED | OUTPATIENT
Start: 2024-10-21

## 2024-11-02 DIAGNOSIS — E11.65 TYPE 2 DIABETES MELLITUS WITH HYPERGLYCEMIA, WITHOUT LONG-TERM CURRENT USE OF INSULIN (H): ICD-10-CM

## 2024-11-04 RX ORDER — GLIPIZIDE 10 MG/1
20 TABLET, FILM COATED, EXTENDED RELEASE ORAL DAILY
Qty: 120 TABLET | Refills: 0 | Status: SHIPPED | OUTPATIENT
Start: 2024-11-04

## 2025-01-16 ENCOUNTER — DOCUMENTATION ONLY (OUTPATIENT)
Dept: LAB | Facility: CLINIC | Age: 50
End: 2025-01-16
Payer: COMMERCIAL

## 2025-01-16 DIAGNOSIS — R74.8 ELEVATED LIVER ENZYMES: ICD-10-CM

## 2025-01-16 DIAGNOSIS — E78.5 HYPERLIPIDEMIA LDL GOAL <100: ICD-10-CM

## 2025-01-16 DIAGNOSIS — E11.65 TYPE 2 DIABETES MELLITUS WITH HYPERGLYCEMIA, WITHOUT LONG-TERM CURRENT USE OF INSULIN (H): Primary | ICD-10-CM

## 2025-01-16 DIAGNOSIS — E11.65 TYPE 2 DIABETES MELLITUS WITH HYPERGLYCEMIA, WITHOUT LONG-TERM CURRENT USE OF INSULIN (H): ICD-10-CM

## 2025-01-16 RX ORDER — SIMVASTATIN 40 MG
40 TABLET ORAL AT BEDTIME
Qty: 90 TABLET | Refills: 0 | Status: SHIPPED | OUTPATIENT
Start: 2025-01-16

## 2025-01-16 NOTE — PROGRESS NOTES
Jose Crews has an upcoming lab appointment:    Future Appointments   Date Time Provider Department Center   2/7/2025  7:30 AM BA LAB ONLY [facility]   2/13/2025  9:20 AM Steffanie Villa MD [facility]     Patient is scheduled for the following lab(s):     There is no order available. Please review and place either future orders or HMPO (Review of Health Maintenance Protocol Orders), as appropriate.    Health Maintenance Due   Topic    ANNUAL REVIEW OF HM ORDERS     A1C Griselda Gibson MLT

## 2025-01-29 DIAGNOSIS — E11.65 TYPE 2 DIABETES MELLITUS WITH HYPERGLYCEMIA, WITHOUT LONG-TERM CURRENT USE OF INSULIN (H): ICD-10-CM

## 2025-01-29 RX ORDER — GLIPIZIDE 10 MG/1
20 TABLET, FILM COATED, EXTENDED RELEASE ORAL DAILY
Qty: 60 TABLET | Refills: 0 | Status: SHIPPED | OUTPATIENT
Start: 2025-01-29

## 2025-02-10 SDOH — HEALTH STABILITY: PHYSICAL HEALTH: ON AVERAGE, HOW MANY DAYS PER WEEK DO YOU ENGAGE IN MODERATE TO STRENUOUS EXERCISE (LIKE A BRISK WALK)?: 2 DAYS

## 2025-02-10 SDOH — HEALTH STABILITY: PHYSICAL HEALTH: ON AVERAGE, HOW MANY MINUTES DO YOU ENGAGE IN EXERCISE AT THIS LEVEL?: 20 MIN

## 2025-02-10 ASSESSMENT — SOCIAL DETERMINANTS OF HEALTH (SDOH): HOW OFTEN DO YOU GET TOGETHER WITH FRIENDS OR RELATIVES?: PATIENT DECLINED

## 2025-02-12 DIAGNOSIS — E11.65 TYPE 2 DIABETES MELLITUS WITH HYPERGLYCEMIA, WITHOUT LONG-TERM CURRENT USE OF INSULIN (H): ICD-10-CM

## 2025-02-13 ENCOUNTER — OFFICE VISIT (OUTPATIENT)
Dept: FAMILY MEDICINE | Facility: CLINIC | Age: 50
End: 2025-02-13
Payer: COMMERCIAL

## 2025-02-13 VITALS
HEART RATE: 71 BPM | OXYGEN SATURATION: 98 % | HEIGHT: 69 IN | RESPIRATION RATE: 16 BRPM | BODY MASS INDEX: 31.25 KG/M2 | DIASTOLIC BLOOD PRESSURE: 85 MMHG | TEMPERATURE: 97.3 F | SYSTOLIC BLOOD PRESSURE: 130 MMHG | WEIGHT: 211 LBS

## 2025-02-13 DIAGNOSIS — E78.5 HYPERLIPIDEMIA LDL GOAL <100: ICD-10-CM

## 2025-02-13 DIAGNOSIS — E11.65 TYPE 2 DIABETES MELLITUS WITH HYPERGLYCEMIA, WITHOUT LONG-TERM CURRENT USE OF INSULIN (H): ICD-10-CM

## 2025-02-13 DIAGNOSIS — Z00.00 ROUTINE GENERAL MEDICAL EXAMINATION AT A HEALTH CARE FACILITY: Primary | ICD-10-CM

## 2025-02-13 DIAGNOSIS — R74.8 ELEVATED LIVER ENZYMES: ICD-10-CM

## 2025-02-13 RX ORDER — SIMVASTATIN 40 MG
40 TABLET ORAL AT BEDTIME
Qty: 90 TABLET | Refills: 1 | Status: SHIPPED | OUTPATIENT
Start: 2025-02-13

## 2025-02-13 RX ORDER — HYDROCHLOROTHIAZIDE 12.5 MG/1
CAPSULE ORAL
Qty: 6 EACH | Refills: 3 | Status: SHIPPED | OUTPATIENT
Start: 2025-02-13

## 2025-02-13 RX ORDER — GLIPIZIDE 10 MG/1
20 TABLET, FILM COATED, EXTENDED RELEASE ORAL DAILY
Qty: 180 TABLET | Refills: 1 | Status: SHIPPED | OUTPATIENT
Start: 2025-02-13

## 2025-02-13 RX ORDER — KETOROLAC TROMETHAMINE 30 MG/ML
1 INJECTION, SOLUTION INTRAMUSCULAR; INTRAVENOUS ONCE
Qty: 1 EACH | Refills: 0 | Status: SHIPPED | OUTPATIENT
Start: 2025-02-13 | End: 2025-02-13

## 2025-02-13 NOTE — PROGRESS NOTES
"Preventive Care Visit  [facility]  Steffanie Villa MD, Family Medicine  Feb 13, 2025      Assessment & Plan     Routine general medical examination at a health care facility  Screening and preventative care discussed.  Immunizations declined today but may schedule nurse visit or pharmacy appointment for these in future.     Type 2 diabetes mellitus with hyperglycemia, without long-term current use of insulin (H)  Has upcoming diabetic education appointment.  Dietary changes planned.  Resume Jardiance and continue Metformin and Glipizide as previous.  Recheck A1C in 3 months.    If blood sugars are not decreasing with medication and dietary changes, he will reach out with update prior to the 3 month lab follow up.  - Continuous Glucose  (FREESTYLE ANAMARIA 3 READER) KIKI; 1 each once for 1 dose. Use to read blood sugars per 's instructions.  - Continuous Glucose Sensor (FREESTYLE ANAMARIA 3 PLUS SENSOR) MISC; Use 1 sensor every 15 days. Use to read blood sugars per 's instructions.  - Adult Eye  Referral; Future  - FOOT EXAM  - empagliflozin (JARDIANCE) 25 MG TABS tablet; Take 1 tablet (25 mg) by mouth daily.    Hyperlipidemia LDL goal <100  Continue simvastatin as previous.  Labs in 6 months.    Elevated liver enzymes  Normal LFT's on recent testing.     Patient has been advised of split billing requirements and indicates understanding: Yes        BMI  Estimated body mass index is 31.5 kg/m  as calculated from the following:    Height as of this encounter: 1.743 m (5' 8.62\").    Weight as of this encounter: 95.7 kg (211 lb).   Weight management plan: Discussed healthy diet and exercise guidelines    Counseling  Appropriate preventive services were addressed with this patient via screening, questionnaire, or discussion as appropriate for fall prevention, nutrition, physical activity, Tobacco-use cessation, social engagement, weight loss and cognition.  " Checklist reviewing preventive services available has been given to the patient.  Reviewed patient's diet, addressing concerns and/or questions.   He is at risk for lack of exercise and has been provided with information to increase physical activity for the benefit of his well-being.   He is at risk for psychosocial distress and has been provided with information to reduce risk.       Patient Instructions   Resume the Jardiance and continue Metformin and Glipizide.  Follow up with diabetic education.    I have sent the continuous glucose monitor order in for you.  IF this is not covered, I will send in the regular finger poke testing supplies for you.    Repeat A1c testing in 3 months is recommended.    Colon cancer screening is recommended in October.  I will place an order at that time for Cologuard screening.    Referral is placed to the eye doctor at [place].  Someone will contact you to set up that appointment.           Lucia Garcia is a 49 year old, presenting for the following:  Physical        2/13/2025     9:03 AM   Additional Questions   Roomed by Henrietta BOBO   Accompanied by Self        Via the Health Maintenance questionnaire, the patient has reported the following services have been completed -Eye Exam: [facility] 2024-03-12, this information has not been sent to the abstraction team.    HPI      Diabetes Follow-up    How often are you checking your blood sugar? Not at all  What concerns do you have today about your diabetes? None   Do you have any of these symptoms? (Select all that apply)  No numbness or tingling in feet.  No redness, sores or blisters on feet.  No complaints of excessive thirst.  No reports of blurry vision.  No significant changes to weight.    Off the jardiance.  Was taking everything else regularly.    Eating last controlled due to stress and work issues.    Decline injectable medications.   BP Readings from Last 2 Encounters:   02/13/25 130/85   08/03/23 115/81      Hemoglobin A1C (%)   Date Value   02/07/2025 11.3 (H)   07/23/2024 8.0 (H)   06/07/2021 11.9 (H)   10/26/2020 9.4 (H)     LDL Cholesterol Calculated (mg/dL)   Date Value   07/23/2024 69   08/03/2023 61   03/09/2020 94   07/05/2019 89             Hyperlipidemia Follow-Up    Are you regularly taking any medication or supplement to lower your cholesterol?   Yes- simvastatin  Are you having muscle aches or other side effects that you think could be caused by your cholesterol lowering medication?  No    Health Care Directive  Patient does not have a Health Care Directive: Discussed advance care planning with patient; information given to patient to review.      2/10/2025   General Health   How would you rate your overall physical health? Good   Feel stress (tense, anxious, or unable to sleep) Very much   (!) STRESS CONCERN  - work and relationship issues.      2/10/2025   Nutrition   Three or more servings of calcium each day? (!) NO   Diet: Regular (no restrictions)   How many servings of fruit and vegetables per day? (!) 2-3   How many sweetened beverages each day? 0-1         2/10/2025   Exercise   Days per week of moderate/strenous exercise 2 days   Average minutes spent exercising at this level 20 min   (!) EXERCISE CONCERN  -  walking      2/10/2025   Social Factors   Frequency of gathering with friends or relatives Patient declined   Worry food won't last until get money to buy more Patient declined   Food not last or not have enough money for food? Patient declined   Do you have housing? (Housing is defined as stable permanent housing and does not include staying ouside in a car, in a tent, in an abandoned building, in an overnight shelter, or couch-surfing.) Yes   Are you worried about losing your housing? Patient declined   Lack of transportation? No   Unable to get utilities (heat,electricity)? No         2/10/2025   Fall Risk   Fallen 2 or more times in the past year? No   Trouble with walking or balance?  No          2/10/2025   Dental   Dentist two times every year? Yes            Today's PHQ-2 Score:       2025     8:49 AM   PHQ-2 (  [employer])   Q1: Little interest or pleasure in doing things 1   Q2: Feeling down, depressed or hopeless 1   PHQ-2 Score 2    Q1: Little interest or pleasure in doing things Several days   Q2: Feeling down, depressed or hopeless Several days   PHQ-2 Score 2       Patient-reported           2/10/2025   Substance Use   Alcohol more than 3/day or more than 7/wk No   Do you use any other substances recreationally? No     Social History     Tobacco Use    Smoking status: Former     Current packs/day: 0.00     Types: Cigarettes     Quit date: 2000     Years since quittin.1    Smokeless tobacco: Never   Vaping Use    Vaping status: Never Used   Substance Use Topics    Alcohol use: Yes     Comment: occassionally    Drug use: No           2/10/2025   STI Screening   New sexual partner(s) since last STI/HIV test? (!) DECLINE   ASCVD Risk   The 10-year ASCVD risk score (Jb SCOTT, et al., 2019) is: 7.1%    Values used to calculate the score:      Age: 49 years      Sex: Male      Is Non- : No      Diabetic: Yes      Tobacco smoker: No      Systolic Blood Pressure: 133 mmHg      Is BP treated: No      HDL Cholesterol: 30 mg/dL      Total Cholesterol: 137 mg/dL            2/10/2025   Contraception/Family Planning   Questions about contraception or family planning No        Reviewed and updated as needed this visit by Provider   Tobacco  Allergies  Meds   Med Hx  Surg Hx  Fam Hx            Past Medical History:   Diagnosis Date    Acne vulgaris     Balanitis 2009    History of atypical/dysplastic nevus 2010    back with positive margins.    MEDICAL HISTORY OF - age 12    hospitalized for a week with unexplained abdominal pain and vomiting    Type II or unspecified type diabetes mellitus without mention of complication, not stated as  "uncontrolled      Past Surgical History:   Procedure Laterality Date    HC TOOTH EXTRACTION W/FORCEP  2006     BP Readings from Last 3 Encounters:   02/13/25 130/85   08/03/23 115/81   09/01/22 115/79    Wt Readings from Last 3 Encounters:   02/13/25 95.7 kg (211 lb)   08/03/23 94.2 kg (207 lb 9.6 oz)   09/01/22 96.1 kg (211 lb 12.8 oz)                      Review of Systems  Constitutional, HEENT, cardiovascular, pulmonary, GI, , musculoskeletal, neuro, skin, endocrine and psych systems are negative, except as otherwise noted.     Objective    Exam  BP (!) 133/98 (BP Location: Right arm, Patient Position: Sitting, Cuff Size: Adult Large)   Pulse 71   Temp 97.3  F (36.3  C) (Oral)   Resp 16   Ht 1.743 m (5' 8.62\")   Wt 95.7 kg (211 lb)   SpO2 98%   BMI 31.50 kg/m     Estimated body mass index is 31.5 kg/m  as calculated from the following:    Height as of this encounter: 1.743 m (5' 8.62\").    Weight as of this encounter: 95.7 kg (211 lb).    Physical Exam  GENERAL: alert, no distress, and obese  EYES: Eyes grossly normal to inspection, PERRL and conjunctivae and sclerae normal  HENT: ear canals and TM's normal, nose and mouth without ulcers or lesions  NECK: no adenopathy, no asymmetry, masses, or scars  RESP: lungs clear to auscultation - no rales, rhonchi or wheezes  CV: regular rate and rhythm, normal S1 S2, no S3 or S4, no murmur, click or rub, no peripheral edema  ABDOMEN: soft, nontender, no hepatosplenomegaly, no masses and bowel sounds normal  MS: no gross musculoskeletal defects noted, no edema  SKIN: no suspicious lesions or rashes and scattered nevi  NEURO: Normal strength and tone, mentation intact and speech normal  PSYCH: mentation appears normal, affect normal/bright  Diabetic foot exam: normal DP and PT pulses, no trophic changes or ulcerative lesions, normal sensory exam, and normal monofilament exam        Signed Electronically by: Steffanie Villa MD    "

## 2025-02-13 NOTE — PATIENT INSTRUCTIONS
Resume the Jardiance and continue Metformin and Glipizide.  Follow up with diabetic education.    I have sent the continuous glucose monitor order in for you.  IF this is not covered, I will send in the regular finger poke testing supplies for you.    Repeat A1c testing in 3 months is recommended.    Colon cancer screening is recommended in October.  I will place an order at that time for Cologuard screening.    Referral is placed to the eye doctor at [place].  Someone will contact you to set up that appointment.        Patient Education   Preventive Care Advice   This is general advice given by our system to help you stay healthy. However, your care team may have specific advice just for you. Please talk to your care team about your preventive care needs.  Nutrition  Eat 5 or more servings of fruits and vegetables each day.  Try wheat bread, brown rice and whole grain pasta (instead of white bread, rice, and pasta).  Get enough calcium and vitamin D. Check the label on foods and aim for 100% of the RDA (recommended daily allowance).  Lifestyle  Exercise at least 150 minutes each week  (30 minutes a day, 5 days a week).  Do muscle strengthening activities 2 days a week. These help control your weight and prevent disease.  No smoking.  Wear sunscreen to prevent skin cancer.  Have a dental exam and cleaning every 6 months.  Yearly exams  See your health care team every year to talk about:  Any changes in your health.  Any medicines your care team has prescribed.  Preventive care, family planning, and ways to prevent chronic diseases.  Shots (vaccines)   HPV shots (up to age 26), if you've never had them before.  Hepatitis B shots (up to age 59), if you've never had them before.  COVID-19 shot: Get this shot when it's due.  Flu shot: Get a flu shot every year.  Tetanus shot: Get a tetanus shot every 10 years.  Pneumococcal, hepatitis A, and RSV shots: Ask your care team if you need these based on your  risk.  Shingles shot (for age 50 and up)  General health tests  Diabetes screening:  Starting at age 35, Get screened for diabetes at least every 3 years.  If you are younger than age 35, ask your care team if you should be screened for diabetes.  Cholesterol test: At age 39, start having a cholesterol test every 5 years, or more often if advised.  Bone density scan (DEXA): At age 50, ask your care team if you should have this scan for osteoporosis (brittle bones).  Hepatitis C: Get tested at least once in your life.  STIs (sexually transmitted infections)  Before age 24: Ask your care team if you should be screened for STIs.  After age 24: Get screened for STIs if you're at risk. You are at risk for STIs (including HIV) if:  You are sexually active with more than one person.  You don't use condoms every time.  You or a partner was diagnosed with a sexually transmitted infection.  If you are at risk for HIV, ask about PrEP medicine to prevent HIV.  Get tested for HIV at least once in your life, whether you are at risk for HIV or not.  Cancer screening tests  Cervical cancer screening: If you have a cervix, begin getting regular cervical cancer screening tests starting at age 21.  Breast cancer scan (mammogram): If you've ever had breasts, begin having regular mammograms starting at age 40. This is a scan to check for breast cancer.  Colon cancer screening: It is important to start screening for colon cancer at age 45.  Have a colonoscopy test every 10 years (or more often if you're at risk) Or, ask your provider about stool tests like a FIT test every year or Cologuard test every 3 years.  To learn more about your testing options, visit:   .  For help making a decision, visit:   https://bit.ly/jn62809.  Prostate cancer screening test: If you have a prostate, ask your care team if a prostate cancer screening test (PSA) at age 55 is right for you.  Lung cancer screening: If you are a current or former smoker ages 50  to 80, ask your care team if ongoing lung cancer screenings are right for you.  For informational purposes only. Not to replace the advice of your health care provider. Copyright   2023 [facility] [employer]. All rights reserved. Clinically reviewed by the [facility] Transitions Program. [employer] 183307 - REV 01/24.  Learning About Stress  What is stress?     Stress is your body's response to a hard situation. Your body can have a physical, emotional, or mental response. Stress is a fact of life for most people, and it affects everyone differently. What causes stress for you may not be stressful for someone else.  A lot of things can cause stress. You may feel stress when you go on a job interview, take a test, or run a race. This kind of short-term stress is normal and even useful. It can help you if you need to work hard or react quickly. For example, stress can help you finish an important job on time.  Long-term stress is caused by ongoing stressful situations or events. Examples of long-term stress include long-term health problems, ongoing problems at work, or conflicts in your family. Long-term stress can harm your health.  How does stress affect your health?  When you are stressed, your body responds as though you are in danger. It makes hormones that speed up your heart, make you breathe faster, and give you a burst of energy. This is called the fight-or-flight stress response. If the stress is over quickly, your body goes back to normal and no harm is done.  But if stress happens too often or lasts too long, it can have bad effects. Long-term stress can make you more likely to get sick, and it can make symptoms of some diseases worse. If you tense up when you are stressed, you may develop neck, shoulder, or low back pain. Stress is linked to high blood pressure and heart disease.  Stress also harms your emotional health. It can make you kessler, tense, or depressed. Your relationships may suffer,  and you may not do well at work or school.  What can you do to manage stress?  You can try these things to help manage stress:   Do something active. Exercise or activity can help reduce stress. Walking is a great way to get started. Even everyday activities such as housecleaning or yard work can help.  Try yoga or lou chi. These techniques combine exercise and meditation. You may need some training at first to learn them.  Do something you enjoy. For example, listen to music or go to a movie. Practice your hobby or do volunteer work.  Meditate. This can help you relax, because you are not worrying about what happened before or what may happen in the future.  Do guided imagery. Imagine yourself in any setting that helps you feel calm. You can use online videos, books, or a teacher to guide you.  Do breathing exercises. For example:  From a standing position, bend forward from the waist with your knees slightly bent. Let your arms dangle close to the floor.  Breathe in slowly and deeply as you return to a standing position. Roll up slowly and lift your head last.  Hold your breath for just a few seconds in the standing position.  Breathe out slowly and bend forward from the waist.  Let your feelings out. Talk, laugh, cry, and express anger when you need to. Talking with supportive friends or family, a counselor, or a kimberly leader about your feelings is a healthy way to relieve stress. Avoid discussing your feelings with people who make you feel worse.  Write. It may help to write about things that are bothering you. This helps you find out how much stress you feel and what is causing it. When you know this, you can find better ways to cope.  What can you do to prevent stress?  You might try some of these things to help prevent stress:  Manage your time. This helps you find time to do the things you want and need to do.  Get enough sleep. Your body recovers from the stresses of the day while you are sleeping.  Get  "support. Your family, friends, and community can make a difference in how you experience stress.  Limit your news feed. Avoid or limit time on social media or news that may make you feel stressed.  Do something active. Exercise or activity can help reduce stress. Walking is a great way to get started.  Where can you learn more?  Go to https://www.[employer].net/patiented  Enter N032 in the search box to learn more about \"Learning About Stress.\"  Current as of: October 24, 2023  Content Version: 14.3    2024 [employer].   Care instructions adapted under license by your healthcare professional. If you have questions about a medical condition or this instruction, always ask your healthcare professional. [employer] disclaims any warranty or liability for your use of this information.       "

## 2025-02-16 ENCOUNTER — TELEPHONE (OUTPATIENT)
Dept: FAMILY MEDICINE | Facility: CLINIC | Age: 50
End: 2025-02-16

## 2025-02-16 NOTE — TELEPHONE ENCOUNTER
Retail Pharmacy Prior Authorization Team   Phone: [phone]    PRIOR AUTHORIZATION DENIED    Medication: FREESTYLE ANAMARIA 3 READER KIKI  Insurance Company: [employer] Non-Specialty PA's - Phone [phone] Fax [phone]  Denial Date: 2/15/2025  Denial Reason(s): MUST HAVE HAD A LEVEL 2 OR LEVEL 3 HYPOGLYCEMIC EVENT WITHIN 6 MONTHS OF INITIAL CGM RX      Appeal Information: IF THE PROVIDER WOULD LIKE TO APPEAL THIS DECISION PLEASE PROVIDE THE PA TEAM WITH A LETTER OF MEDICAL NECESSITY      Patient Notified: NO

## 2025-02-17 ENCOUNTER — ALLIED HEALTH/NURSE VISIT (OUTPATIENT)
Dept: EDUCATION SERVICES | Facility: CLINIC | Age: 50
End: 2025-02-17
Attending: FAMILY MEDICINE
Payer: COMMERCIAL

## 2025-02-17 DIAGNOSIS — E11.65 TYPE 2 DIABETES MELLITUS WITH HYPERGLYCEMIA, WITHOUT LONG-TERM CURRENT USE OF INSULIN (H): ICD-10-CM

## 2025-02-17 DIAGNOSIS — E11.65 UNCONTROLLED DIABETES MELLITUS WITH HYPERGLYCEMIA, WITHOUT LONG-TERM CURRENT USE OF INSULIN (H): ICD-10-CM

## 2025-02-17 PROCEDURE — G0108 DIAB MANAGE TRN  PER INDIV: HCPCS | Performed by: DIETITIAN, REGISTERED

## 2025-02-17 NOTE — PATIENT INSTRUCTIONS
Work on increasing exercise  Aim for 60-75 grams of carbohydrates at meals and 15-30 at snacks  Balance plate with lean protein and non-starchy vegetables    ARACELIS Blanca [facility]  Diabetes Education Department  [facility] [place]  Phone: [phone]  [phone]

## 2025-02-17 NOTE — PROGRESS NOTES
Diabetes Self-Management Education & Support    Presents for: Individual review    Type of Service: In Person Visit      Assessment  Jose has had diabetes since 2008, however he lost his job/insurance in July of last year and stopped taking his Jardiance. He has also had a lot of stress lately. His A1C took a jump up. He has been walking twice/week and has been working on diet changes. He is interested in joining a gym, he is looking into options. He brought his Chely 3 sensor with and we started in the clinic today.     Patient's most recent   Lab Results   Component Value Date    A1C 11.3 02/07/2025    A1C 11.9 06/07/2021     is not meeting goal of <7.0    Diabetes knowledge and skills assessment:   Patient is knowledgeable in diabetes management concepts related to: Taking Medication, Problem Solving, Reducing Risks, and Healthy Coping    Based on learning assessment above, most appropriate setting for further diabetes education would be: Individual setting.    Care Plan and Education Provided:  Healthy Eating: Balanced meals, Carbohydrate Counting, Consistency in amount and timing of carbohydrate intake, Heart healthy diet, Plate planning method, and Portion control, Being Active: Amount recommended (150 minutes moderate or 75 minutes vigorous activity and 2-3 days strength training per week), Finding a physical activity routine that works for you, and Precautions to take with exercise, and Monitoring: Blood glucose versus Continuous Glucose Monitoring, Frequency of monitoring, Individual glucose targets, Log and interpret results, and CGM instruction: Patient was instructed on Freestyle Chely System: Freestyle Chely sensor: insertion technique, sensor site location and rotation, insulin administration in relation to sensor placement, sensor wear, reasons to remove sensor (MRI, CT, diathermy), Vitamin C & Aspirin effects on sensor and Use of trends and graphs for pattern management and problem  solving    Patient verbalized understanding of diabetes self-management education concepts discussed, opportunities for ongoing education and support, and recommendations provided today.    Plan  Work on increasing exercise  Aim for 60-75 grams of carbohydrates at meals and 15-30 at snacks  Balance plate with lean protein and non-starchy vegetables    Topics to cover at upcoming visits: Healthy Eating, Being Active, Monitoring, Taking Medication, Problem Solving, Reducing Risks, and Healthy Coping    Follow-up:  Upcoming Diabetes Ed Appointments     Visit Type Date Time Department    DIABETES ED 2/17/2025  2:00 PM [facility]    DI[facility]ES ED 3/17/2025  2:00 PM [facility]        See Care Plan for co-developed, patient-state behavior change goals.    Education Materials Provided:  - [employer] Healthy Living with Diabetes Book  - My Plate Planner       Subjective/Objective  Jose is an 49 year old year old, presenting for the following diabetes education related to: Presents for: Individual review  Accompanied by: Self  Diabetes education in the past 24mo: (Patient-Rptd) No  Focus of Visit: Taking Medication, Healthy Eating  Diabetes type: (Patient-Rptd) Type 2  Disease course: (Patient-Rptd) Stable  How confident are you filling out medical forms by yourself:: (Patient-Rptd) Extremely  Diabetes management related comments/concerns: (Patient-Rptd) Guidance on using continous glucose monitor  Transportation concerns: No  Difficulty affording diabetes medication?: No  Difficulty affording diabetes testing supplies?: No  Other concerns:: None  Cultural Influences/Ethnic Background:  Not  or     Diabetes Symptoms & Complications:  Diabetes Related Symptoms: (Patient-Rptd) Polyuria (increased urination)  Weight trend: (Patient-Rptd) Stable  Symptom course: (Patient-Rptd) Stable  Disease course: (Patient-Rptd) Stable  Complications assessed today?: No    Patient Problem List and Family Medical  "History reviewed for relevant medical history, current medical status, and diabetes risk factors.    Vitals:  There were no vitals taken for this visit.  Estimated body mass index is 31.5 kg/m  as calculated from the following:    Height as of 2/13/25: 1.743 m (5' 8.62\").    Weight as of 2/13/25: 95.7 kg (211 lb).   Last 3 BP:   BP Readings from Last 3 Encounters:   02/13/25 130/85   08/03/23 115/81   09/01/22 115/79       History   Smoking Status    Former    Types: Cigarettes    Quit date: 1/1/2000   Smokeless Tobacco    Never       Labs:  Lab Results   Component Value Date    A1C 11.3 02/07/2025    A1C 11.9 06/07/2021     Lab Results   Component Value Date     02/07/2025     09/01/2022     10/26/2020     Lab Results   Component Value Date    LDL 69 07/23/2024    LDL 94 03/09/2020     HDL Cholesterol   Date Value Ref Range Status   03/09/2020 32 (L) >39 mg/dL Final     Direct Measure HDL   Date Value Ref Range Status   07/23/2024 30 (L) >=40 mg/dL Final   ]  GFR Estimate   Date Value Ref Range Status   02/07/2025 >90 >60 mL/min/1.73m2 Final     Comment:     eGFR calculated using 2021 CKD-EPI equation.   10/26/2020 >90 >60 mL/min/[1.73_m2] Final     Comment:     Non  GFR Calc  Starting 12/18/2018, serum creatinine based estimated GFR (eGFR) will be   calculated using the Chronic Kidney Disease Epidemiology Collaboration   (CKD-EPI) equation.       GFR Estimate If Black   Date Value Ref Range Status   10/26/2020 >90 >60 mL/min/[1.73_m2] Final     Comment:      GFR Calc  Starting 12/18/2018, serum creatinine based estimated GFR (eGFR) will be   calculated using the Chronic Kidney Disease Epidemiology Collaboration   (CKD-EPI) equation.       Lab Results   Component Value Date    CR 0.86 02/07/2025    CR 0.84 10/26/2020     No results found for: \"MICROALBUMIN\"    Healthy Eating:  Healthy Eating Assessed Today: Yes  Cultural/Evangelical diet restrictions?: " (Patient-Rptd) No  How many times a week on average do you eat food made away from home (restaurant/take-out)?: (Patient-Rptd) 3  Meals include: (Patient-Rptd) Breakfast, Lunch, Dinner, Evening Snack  Breakfast: 1 bowl of cereal (honey nut cheerios), apple, banana  Lunch: Soup - canned Progresso chicken and sausage gumbo  Dinner: Red meat, vegetables, with starch- potato or Maori  Snacks: potato chips, chocolates, cookies, brownies  Other: Coke zero - 6/day  Beverages: (Patient-Rptd) Water, Milk, Diet soda  Has patient met with a dietitian in the past?: Yes    Being Active:  Being Active Assessed Today: Yes  Exercise:: Yes (walking)  Days per week of moderate to strenuous exercise (like a brisk walk): 2  Barrier to exercise: (Patient-Rptd) None    Monitoring:  Monitoring Assessed Today: Yes  Did patient bring glucose meter to appointment? : No  Blood Glucose Meter: (Patient-Rptd) FreeStyle  Times checking blood sugar at home (number): (Patient-Rptd) 5+  Times checking blood sugar at home (per): (Patient-Rptd) Month      Taking Medications:  Diabetes Medication(s)       Biguanides       metFORMIN (GLUCOPHAGE) 1000 MG tablet Take 1 tablet (1,000 mg) by mouth 2 times daily (with meals).       Sodium-Glucose Co-Transporter 2 (SGLT2) Inhibitors       empagliflozin (JARDIANCE) 25 MG TABS tablet Take 1 tablet (25 mg) by mouth daily.       Sulfonylureas       glipiZIDE (GLUCOTROL XL) 10 MG 24 hr tablet Take 2 tablets (20 mg) by mouth daily.          Taking Medication Assessed Today: Yes  Current Treatments: (Patient-Rptd) Oral Medication (taken by mouth)  Problems taking diabetes medications regularly?: No  Diabetes medication side effects?: No    Problem Solving:  Problem Solving Assessed Today: Yes  Is the patient at risk for hypoglycemia?: No  Is the patient at risk for DKA?: No        Reducing Risks:  Reducing Risks Assessed Today: Yes  Diabetes Risks: Family History, Sedentary Lifestyle, Age over 45 years  CAD Risks:  Diabetes Mellitus, Family history, Male sex, Sedentary lifestyle  Has dilated eye exam at least once a year?: (Patient-Rptd) Yes  Sees dentist every 6 months?: (Patient-Rptd) Yes  Feet checked by healthcare provider in the last year?: (Patient-Rptd) Yes    Healthy Coping:  Healthy Coping Assessed Today: Yes  Informal Support system:: (Patient-Rptd) Spouse    Anjali Brown RD  Time Spent: 60 minutes  Encounter Type: Individual    Any diabetes medication dose changes were made via the CDCES Standing Orders under the patient's referring provider.

## 2025-03-17 ENCOUNTER — ALLIED HEALTH/NURSE VISIT (OUTPATIENT)
Dept: EDUCATION SERVICES | Facility: CLINIC | Age: 50
End: 2025-03-17
Payer: COMMERCIAL

## 2025-03-17 DIAGNOSIS — E11.65 TYPE 2 DIABETES MELLITUS WITH HYPERGLYCEMIA, WITHOUT LONG-TERM CURRENT USE OF INSULIN (H): Primary | ICD-10-CM

## 2025-03-17 PROCEDURE — G0108 DIAB MANAGE TRN  PER INDIV: HCPCS | Performed by: DIETITIAN, REGISTERED

## 2025-03-17 NOTE — PROGRESS NOTES
Diabetes Self-Management Education & Support    Presents for: Follow-up    Type of Service: In Person Visit      Assessment  Jose has made improvements in his BG, he has been working on improving his diet. He has found the Chely 3 to be very helpful. He is experiencing a lot of stress in his life right now as he is unemployed and having a hard time finding a job in his field, thinking about pivoting to a different field, which is not what he wants to do.  He also has some stress at home with his wife having to move out because of the stairs in their house. We discussed the overnight/early morning rise in his blood sugars. Recommended to continue working on weight loss and increasing exercise duration/intensity and work on adding in some strength training.     See CGM Reports in Monitoring section below.      Care Plan and Education Provided:  Healthy Eating: Balanced meals, Carbohydrate Counting, Consistency in amount and timing of carbohydrate intake, Plate planning method, and Portion control, Being Active: Amount recommended (150 minutes moderate or 75 minutes vigorous activity and 2-3 days strength training per week), Finding a physical activity routine that works for you, and Precautions to take with exercise, and Monitoring: Frequency of monitoring and Individual glucose targets    Patient verbalized understanding of diabetes self-management education concepts discussed, opportunities for ongoing education and support, and recommendations provided today.    Plan    Work on increasing intensity/duration of exercise, consider adding in strength training  Continue working on diet changes, follow plate planner and carb goals      Topics to cover at upcoming visits: Healthy Eating, Being Active, Monitoring, Taking Medication, Problem Solving, Reducing Risks, and Healthy Coping    Follow-up:  Upcoming Diabetes Ed Appointments     Visit Type Date Time Department    DIABETES ED 3/17/2025  2:00 PM  [facility] ED     "    See Care Plan for co-developed, patient-state behavior change goals.    Education Materials Provided:  No new materials provided today      Subjective/Objective  Jose is an 49 year old year old, presenting for the following diabetes education related to: Presents for: Follow-up  Accompanied by: Self  Diabetes education in the past 24mo: Yes  Focus of Visit: Taking Medication, Healthy Eating  Diabetes type: Type 2  Disease course: Stable  How confident are you filling out medical forms by yourself:: Extremely  Diabetes management related comments/concerns: Guidance on using continous glucose monitor  Transportation concerns: No  Difficulty affording diabetes medication?: No  Difficulty affording diabetes testing supplies?: No  Other concerns:: None  Cultural Influences/Ethnic Background:  Not  or     Diabetes Symptoms & Complications:  Diabetes Related Symptoms: Polyuria (increased urination)  Weight trend: Stable  Symptom course: Stable  Disease course: Stable  Complications assessed today?: No    Patient Problem List and Family Medical History reviewed for relevant medical history, current medical status, and diabetes risk factors.    Vitals:  There were no vitals taken for this visit.  Estimated body mass index is 31.5 kg/m  as calculated from the following:    Height as of 2/13/25: 1.743 m (5' 8.62\").    Weight as of 2/13/25: 95.7 kg (211 lb).   Last 3 BP:   BP Readings from Last 3 Encounters:   02/13/25 130/85   08/03/23 115/81   09/01/22 115/79       History   Smoking Status    Former    Types: Cigarettes    Quit date: 1/1/2000   Smokeless Tobacco    Never       Labs:  Lab Results   Component Value Date    A1C 11.3 02/07/2025    A1C 11.9 06/07/2021     Lab Results   Component Value Date     02/07/2025     09/01/2022     10/26/2020     Lab Results   Component Value Date    LDL 69 07/23/2024    LDL 94 03/09/2020     HDL Cholesterol   Date Value Ref Range Status   03/09/2020 " "32 (L) >39 mg/dL Final     Direct Measure HDL   Date Value Ref Range Status   07/23/2024 30 (L) >=40 mg/dL Final   ]  GFR Estimate   Date Value Ref Range Status   02/07/2025 >90 >60 mL/min/1.73m2 Final     Comment:     eGFR calculated using 2021 CKD-EPI equation.   10/26/2020 >90 >60 mL/min/[1.73_m2] Final     Comment:     Non  GFR Calc  Starting 12/18/2018, serum creatinine based estimated GFR (eGFR) will be   calculated using the Chronic Kidney Disease Epidemiology Collaboration   (CKD-EPI) equation.       GFR Estimate If Black   Date Value Ref Range Status   10/26/2020 >90 >60 mL/min/[1.73_m2] Final     Comment:      GFR Calc  Starting 12/18/2018, serum creatinine based estimated GFR (eGFR) will be   calculated using the Chronic Kidney Disease Epidemiology Collaboration   (CKD-EPI) equation.       Lab Results   Component Value Date    CR 0.86 02/07/2025    CR 0.84 10/26/2020     No results found for: \"MICROALBUMIN\"    Healthy Eating:  Healthy Eating Assessed Today: Yes  Cultural/Gnosticism diet restrictions?: No  Meal planning/habits: Carb counting  How many times a week on average do you eat food made away from home (restaurant/take-out)?: 3  Meals include: Breakfast, Lunch, Dinner, Evening Snack  Breakfast: 1 bowl of cereal (honey nut cheerios), apple, banana  Lunch: Soup - canned Progresso chicken and sausage gumbo OR tacos OR enchiladas  Dinner: Red meat, vegetables, with starch- potato or [place]inyarwanda  Snacks: string cheese  Other: Coke zero - 6/day  Beverages: Water, Milk, Diet soda  Has patient met with a dietitian in the past?: Yes    Being Active:  Being Active Assessed Today: Yes  Exercise:: Yes (walking)  Days per week of moderate to strenuous exercise (like a brisk walk): 2  Barrier to exercise: None    Monitoring:  Monitoring Assessed Today: Yes  Did patient bring glucose meter to appointment? : No  Blood Glucose Meter: FreeStyle, CGM  Times checking blood sugar at home " (number): 5+  Times checking blood sugar at home (per): Month          Taking Medications:  Diabetes Medication(s)       Biguanides       metFORMIN (GLUCOPHAGE) 1000 MG tablet Take 1 tablet (1,000 mg) by mouth 2 times daily (with meals).       Sodium-Glucose Co-Transporter 2 (SGLT2) Inhibitors       empagliflozin (JARDIANCE) 25 MG TABS tablet Take 1 tablet (25 mg) by mouth daily.       Sulfonylureas       glipiZIDE (GLUCOTROL XL) 10 MG 24 hr tablet Take 2 tablets (20 mg) by mouth daily.          Taking Medication Assessed Today: Yes  Current Treatments: Oral Medication (taken by mouth)  Problems taking diabetes medications regularly?: No  Diabetes medication side effects?: No    Anjali Brown RD  Time Spent: 45 minutes  Encounter Type: Individual    Any diabetes medication dose changes were made via the CDCES Standing Orders under the patient's referring provider.

## 2025-08-06 DIAGNOSIS — E11.65 TYPE 2 DIABETES MELLITUS WITH HYPERGLYCEMIA, WITHOUT LONG-TERM CURRENT USE OF INSULIN (H): ICD-10-CM

## 2025-08-06 DIAGNOSIS — E78.5 HYPERLIPIDEMIA LDL GOAL <100: ICD-10-CM

## 2025-08-06 RX ORDER — GLIPIZIDE 10 MG/1
20 TABLET, FILM COATED, EXTENDED RELEASE ORAL DAILY
Qty: 60 TABLET | Refills: 0 | Status: SHIPPED | OUTPATIENT
Start: 2025-08-06

## 2025-08-06 RX ORDER — SIMVASTATIN 40 MG
40 TABLET ORAL AT BEDTIME
Qty: 30 TABLET | Refills: 0 | Status: SHIPPED | OUTPATIENT
Start: 2025-08-06

## 2025-08-24 ENCOUNTER — HEALTH MAINTENANCE LETTER (OUTPATIENT)
Age: 50
End: 2025-08-24

## 2025-09-03 DIAGNOSIS — E78.5 HYPERLIPIDEMIA LDL GOAL <100: ICD-10-CM

## 2025-09-03 DIAGNOSIS — E11.65 TYPE 2 DIABETES MELLITUS WITH HYPERGLYCEMIA, WITHOUT LONG-TERM CURRENT USE OF INSULIN (H): ICD-10-CM

## 2025-09-03 RX ORDER — EMPAGLIFLOZIN 25 MG/1
TABLET, FILM COATED ORAL
Qty: 30 TABLET | Refills: 0 | Status: SHIPPED | OUTPATIENT
Start: 2025-09-03

## 2025-09-03 RX ORDER — SIMVASTATIN 40 MG
40 TABLET ORAL AT BEDTIME
Qty: 30 TABLET | Refills: 0 | Status: SHIPPED | OUTPATIENT
Start: 2025-09-03

## 2025-09-03 RX ORDER — GLIPIZIDE 10 MG/1
TABLET, FILM COATED, EXTENDED RELEASE ORAL
Qty: 60 TABLET | Refills: 0 | Status: SHIPPED | OUTPATIENT
Start: 2025-09-03

## 2025-09-05 ENCOUNTER — LAB (OUTPATIENT)
Dept: LAB | Facility: CLINIC | Age: 50
End: 2025-09-05
Payer: COMMERCIAL

## 2025-09-05 DIAGNOSIS — Z13.6 SCREENING FOR CARDIOVASCULAR CONDITION: Primary | ICD-10-CM

## 2025-09-05 DIAGNOSIS — E11.65 TYPE 2 DIABETES MELLITUS WITH HYPERGLYCEMIA, WITHOUT LONG-TERM CURRENT USE OF INSULIN (H): ICD-10-CM
